# Patient Record
Sex: FEMALE | Race: WHITE | NOT HISPANIC OR LATINO
[De-identification: names, ages, dates, MRNs, and addresses within clinical notes are randomized per-mention and may not be internally consistent; named-entity substitution may affect disease eponyms.]

---

## 2017-02-21 ENCOUNTER — RX RENEWAL (OUTPATIENT)
Age: 72
End: 2017-02-21

## 2017-11-10 ENCOUNTER — RX RENEWAL (OUTPATIENT)
Age: 72
End: 2017-11-10

## 2017-11-27 ENCOUNTER — NON-APPOINTMENT (OUTPATIENT)
Age: 72
End: 2017-11-27

## 2017-11-27 ENCOUNTER — LABORATORY RESULT (OUTPATIENT)
Age: 72
End: 2017-11-27

## 2017-11-27 ENCOUNTER — APPOINTMENT (OUTPATIENT)
Dept: INTERNAL MEDICINE | Facility: CLINIC | Age: 72
End: 2017-11-27
Payer: MEDICARE

## 2017-11-27 VITALS
WEIGHT: 165 LBS | SYSTOLIC BLOOD PRESSURE: 126 MMHG | DIASTOLIC BLOOD PRESSURE: 84 MMHG | BODY MASS INDEX: 28.17 KG/M2 | HEIGHT: 64 IN

## 2017-11-27 VITALS
DIASTOLIC BLOOD PRESSURE: 84 MMHG | BODY MASS INDEX: 28.17 KG/M2 | WEIGHT: 165 LBS | SYSTOLIC BLOOD PRESSURE: 126 MMHG | HEIGHT: 64 IN

## 2017-11-27 PROCEDURE — 93000 ELECTROCARDIOGRAM COMPLETE: CPT

## 2017-11-27 PROCEDURE — 94010 BREATHING CAPACITY TEST: CPT

## 2017-11-27 PROCEDURE — 36415 COLL VENOUS BLD VENIPUNCTURE: CPT

## 2017-11-27 PROCEDURE — 99215 OFFICE O/P EST HI 40 MIN: CPT | Mod: 25

## 2017-12-26 LAB
ALBUMIN SERPL ELPH-MCNC: 4.3 G/DL
ALP BLD-CCNC: 81 U/L
ALT SERPL-CCNC: 19 U/L
ANION GAP SERPL CALC-SCNC: 17 MMOL/L
APPEARANCE: CLEAR
AST SERPL-CCNC: 20 U/L
BASOPHILS # BLD AUTO: 0.01 K/UL
BASOPHILS NFR BLD AUTO: 0.1 %
BILIRUB SERPL-MCNC: 0.6 MG/DL
BILIRUBIN URINE: NEGATIVE
BLOOD URINE: NEGATIVE
BUN SERPL-MCNC: 15 MG/DL
CALCIUM SERPL-MCNC: 10 MG/DL
CHLORIDE SERPL-SCNC: 103 MMOL/L
CHOLEST SERPL-MCNC: 160 MG/DL
CHOLEST/HDLC SERPL: 2.5 RATIO
CO2 SERPL-SCNC: 21 MMOL/L
COLOR: YELLOW
CREAT SERPL-MCNC: 0.89 MG/DL
EOSINOPHIL # BLD AUTO: 0.06 K/UL
EOSINOPHIL NFR BLD AUTO: 0.4 %
GLUCOSE QUALITATIVE U: NEGATIVE MG/DL
GLUCOSE SERPL-MCNC: 107 MG/DL
HCT VFR BLD CALC: 40.4 %
HDLC SERPL-MCNC: 64 MG/DL
HGB BLD-MCNC: 13 G/DL
IMM GRANULOCYTES NFR BLD AUTO: 0.3 %
KETONES URINE: NEGATIVE
LDLC SERPL CALC-MCNC: 76 MG/DL
LEUKOCYTE ESTERASE URINE: NEGATIVE
LYMPHOCYTES # BLD AUTO: 2.03 K/UL
LYMPHOCYTES NFR BLD AUTO: 12.8 %
MAN DIFF?: NORMAL
MCHC RBC-ENTMCNC: 26.7 PG
MCHC RBC-ENTMCNC: 32.2 GM/DL
MCV RBC AUTO: 83.1 FL
MONOCYTES # BLD AUTO: 0.66 K/UL
MONOCYTES NFR BLD AUTO: 4.2 %
NEUTROPHILS # BLD AUTO: 13.05 K/UL
NEUTROPHILS NFR BLD AUTO: 82.2 %
NITRITE URINE: NEGATIVE
PH URINE: 6
PLATELET # BLD AUTO: 202 K/UL
POTASSIUM SERPL-SCNC: 4.4 MMOL/L
PROT SERPL-MCNC: 7.6 G/DL
PROTEIN URINE: NEGATIVE MG/DL
RBC # BLD: 4.86 M/UL
RBC # FLD: 13.3 %
SODIUM SERPL-SCNC: 141 MMOL/L
SPECIFIC GRAVITY URINE: 1.02
T3 SERPL-MCNC: 109 NG/DL
T4 SERPL-MCNC: 7.9 UG/DL
TRIGL SERPL-MCNC: 100 MG/DL
TSH SERPL-ACNC: 1.92 UIU/ML
UROBILINOGEN URINE: NEGATIVE MG/DL
WBC # FLD AUTO: 15.86 K/UL

## 2018-03-15 ENCOUNTER — RX RENEWAL (OUTPATIENT)
Age: 73
End: 2018-03-15

## 2018-07-26 ENCOUNTER — APPOINTMENT (OUTPATIENT)
Dept: ORTHOPEDIC SURGERY | Facility: CLINIC | Age: 73
End: 2018-07-26
Payer: MEDICARE

## 2018-07-26 VITALS
DIASTOLIC BLOOD PRESSURE: 80 MMHG | SYSTOLIC BLOOD PRESSURE: 142 MMHG | HEART RATE: 65 BPM | WEIGHT: 154 LBS | BODY MASS INDEX: 26.29 KG/M2 | HEIGHT: 64 IN

## 2018-07-26 VITALS — HEIGHT: 64 IN

## 2018-07-26 DIAGNOSIS — Z78.9 OTHER SPECIFIED HEALTH STATUS: ICD-10-CM

## 2018-07-26 DIAGNOSIS — M17.11 UNILATERAL PRIMARY OSTEOARTHRITIS, RIGHT KNEE: ICD-10-CM

## 2018-07-26 DIAGNOSIS — Z82.49 FAMILY HISTORY OF ISCHEMIC HEART DISEASE AND OTHER DISEASES OF THE CIRCULATORY SYSTEM: ICD-10-CM

## 2018-07-26 DIAGNOSIS — Z60.2 PROBLEMS RELATED TO LIVING ALONE: ICD-10-CM

## 2018-07-26 DIAGNOSIS — M25.561 PAIN IN RIGHT KNEE: ICD-10-CM

## 2018-07-26 PROCEDURE — 72170 X-RAY EXAM OF PELVIS: CPT

## 2018-07-26 PROCEDURE — 99204 OFFICE O/P NEW MOD 45 MIN: CPT

## 2018-07-26 PROCEDURE — 73562 X-RAY EXAM OF KNEE 3: CPT | Mod: RT

## 2018-07-26 SDOH — SOCIAL STABILITY - SOCIAL INSECURITY: PROBLEMS RELATED TO LIVING ALONE: Z60.2

## 2018-08-03 PROBLEM — Z82.49 FAMILY HISTORY OF CARDIAC DISORDER: Status: ACTIVE | Noted: 2018-07-26

## 2018-08-03 PROBLEM — Z78.9 DOES NOT USE ILLICIT DRUGS: Status: ACTIVE | Noted: 2018-07-26

## 2018-09-04 LAB — NONINV COLON CA DNA+OCC BLD SCRN STL QL: NEGATIVE

## 2018-12-06 ENCOUNTER — APPOINTMENT (OUTPATIENT)
Dept: INTERNAL MEDICINE | Facility: CLINIC | Age: 73
End: 2018-12-06
Payer: MEDICARE

## 2018-12-06 VITALS
OXYGEN SATURATION: 96 % | DIASTOLIC BLOOD PRESSURE: 80 MMHG | TEMPERATURE: 97.3 F | HEART RATE: 64 BPM | BODY MASS INDEX: 26.98 KG/M2 | HEIGHT: 64 IN | WEIGHT: 158 LBS | SYSTOLIC BLOOD PRESSURE: 130 MMHG | RESPIRATION RATE: 16 BRPM

## 2018-12-06 DIAGNOSIS — F51.02 ADJUSTMENT INSOMNIA: ICD-10-CM

## 2018-12-06 PROCEDURE — 93000 ELECTROCARDIOGRAM COMPLETE: CPT

## 2018-12-06 PROCEDURE — G0438: CPT | Mod: 25

## 2018-12-06 PROCEDURE — 36415 COLL VENOUS BLD VENIPUNCTURE: CPT

## 2018-12-06 PROCEDURE — G0009: CPT

## 2018-12-06 PROCEDURE — 99215 OFFICE O/P EST HI 40 MIN: CPT | Mod: 25

## 2018-12-06 PROCEDURE — 94010 BREATHING CAPACITY TEST: CPT

## 2018-12-06 PROCEDURE — 90670 PCV13 VACCINE IM: CPT

## 2018-12-06 NOTE — HEALTH RISK ASSESSMENT
[Very Good] : ~his/her~  mood as very good [FreeTextEntry1] : Gen. health [] : No [No falls in past year] : Patient reported no falls in the past year [0] : 2) Feeling down, depressed, or hopeless: Not at all (0) [de-identified] : Orthopedics [[id number]] : 0 [Patient reported mammogram was normal] : Patient reported mammogram was normal [Patient reported colonoscopy was normal] : Patient reported colonoscopy was normal [HIV test declined] : HIV test declined [Hepatitis C test declined] : Hepatitis C test declined [Change in mental status noted] : No change in mental status noted [Language] : denies difficulty with language [Behavior] : denies difficulty with behavior [Learning/Retaining New Information] : denies difficulty learning/retaining new information [Handling Complex Tasks] : denies difficulty handling complex tasks [Reasoning] : denies difficulty with reasoning [Spatial Ability and Orientation] : denies difficulty with spatial ability and orientation [None] : None [Alone] : lives alone [Unemployed] : unemployed [Retired] : retired [] :  [Sexually Active] : not sexually active [High Risk Behavior] : no high risk behavior [Feels Safe at Home] : Feels safe at home [Fully functional (bathing, dressing, toileting, transferring, walking, feeding)] : Fully functional (bathing, dressing, toileting, transferring, walking, feeding) [Fully functional (using the telephone, shopping, preparing meals, housekeeping, doing laundry, using] : Fully functional and needs no help or supervision to perform IADLs (using the telephone, shopping, preparing meals, housekeeping, doing laundry, using transportation, managing medications and managing finances) [Reports changes in hearing] : Reports no changes in hearing [Reports changes in vision] : Reports no changes in vision [Reports changes in dental health] : Reports no changes in dental health [Smoke Detector] : smoke detector [Carbon Monoxide Detector] : carbon monoxide detector [Guns at Home] : no guns at home [Seat Belt] :  uses seat belt [Sunscreen] : uses sunscreen [Travel to Developing Areas] : does not  travel to developing areas [Caregiver Concerns] : does not have caregiver concerns [MammogramDate] : 12/17 [BoneDensityDate] : 4/15 [ColonoscopyDate] : 7/18

## 2018-12-06 NOTE — HISTORY OF PRESENT ILLNESS
[FreeTextEntry1] : Complete annual examination [de-identified] : Heels well except for chronic fatigue, not new and generalized osteoarthritis with some focus on the right knee. Recent orthopedic visit-not bone-on-bone.

## 2018-12-06 NOTE — ASSESSMENT
[FreeTextEntry1] : She will continue atorvastatin. She has a cataract OD, she is reluctant to address this because of problems that her  had. Also the symptoms are somewhat moderated. The right knee issue is also moderate enough for her to live with\par \par Influenza immunization is refused by the patient. She is warned.\par \par Prevnar 13 is administered to the left arm. She should receive Pneumovax-23 after 12 months.

## 2018-12-06 NOTE — DATA REVIEWED
[FreeTextEntry1] : Echocardiogram reveals a sinus rhythm with minimal nonspecific ST and T abnormalities. Spirometry is normal. Pulse oximetry is normal.                                                                                                                      Blood work and urinalysis results are pending.

## 2018-12-06 NOTE — REVIEW OF SYSTEMS
[Patient Intake Form Reviewed] : Patient intake form was reviewed [Vision Problems] : vision problems [Chest Pain] : no chest pain [Palpitations] : palpitations [Leg Claudication] : no leg claudication [Lower Ext Edema] : no lower extremity edema [Orthopnea] : no orthopnea [Paroysmal Nocturnal Dyspnea] : no paroysmal nocturnal dyspnea [Negative] : Neurological [FreeTextEntry3] : Mild Tierney issue at night [FreeTextEntry5] : Only with stress [FreeTextEntry9] : See present Illness.

## 2018-12-12 LAB
ALBUMIN SERPL ELPH-MCNC: 4.4 G/DL
ALP BLD-CCNC: 83 U/L
ALT SERPL-CCNC: 24 U/L
ANION GAP SERPL CALC-SCNC: 10 MMOL/L
APPEARANCE: CLEAR
AST SERPL-CCNC: 21 U/L
BASOPHILS # BLD AUTO: 0.01 K/UL
BASOPHILS NFR BLD AUTO: 0.1 %
BILIRUB SERPL-MCNC: 0.5 MG/DL
BILIRUBIN URINE: NEGATIVE
BLOOD URINE: NEGATIVE
BUN SERPL-MCNC: 16 MG/DL
CALCIUM SERPL-MCNC: 9.9 MG/DL
CHLORIDE SERPL-SCNC: 108 MMOL/L
CO2 SERPL-SCNC: 26 MMOL/L
COLOR: YELLOW
CREAT SERPL-MCNC: 1.08 MG/DL
EOSINOPHIL # BLD AUTO: 0.15 K/UL
EOSINOPHIL NFR BLD AUTO: 1.9 %
GLUCOSE QUALITATIVE U: NEGATIVE MG/DL
GLUCOSE SERPL-MCNC: 92 MG/DL
HCT VFR BLD CALC: 40.9 %
HGB BLD-MCNC: 13.2 G/DL
IMM GRANULOCYTES NFR BLD AUTO: 0.3 %
KETONES URINE: NEGATIVE
LEUKOCYTE ESTERASE URINE: NEGATIVE
LYMPHOCYTES # BLD AUTO: 2.22 K/UL
LYMPHOCYTES NFR BLD AUTO: 28.2 %
MAN DIFF?: NORMAL
MCHC RBC-ENTMCNC: 26.8 PG
MCHC RBC-ENTMCNC: 32.3 GM/DL
MCV RBC AUTO: 83.1 FL
MONOCYTES # BLD AUTO: 0.47 K/UL
MONOCYTES NFR BLD AUTO: 6 %
NEUTROPHILS # BLD AUTO: 4.99 K/UL
NEUTROPHILS NFR BLD AUTO: 63.5 %
NITRITE URINE: NEGATIVE
PH URINE: 5.5
PLATELET # BLD AUTO: 214 K/UL
POTASSIUM SERPL-SCNC: 4.6 MMOL/L
PROT SERPL-MCNC: 7.4 G/DL
PROTEIN URINE: NEGATIVE MG/DL
RBC # BLD: 4.92 M/UL
RBC # FLD: 13.3 %
SODIUM SERPL-SCNC: 144 MMOL/L
SPECIFIC GRAVITY URINE: 1.02
T3 SERPL-MCNC: 107 NG/DL
T4 SERPL-MCNC: 7.9 UG/DL
TSH SERPL-ACNC: 1.89 UIU/ML
UROBILINOGEN URINE: NEGATIVE MG/DL
WBC # FLD AUTO: 7.86 K/UL

## 2019-05-16 ENCOUNTER — APPOINTMENT (OUTPATIENT)
Dept: ORTHOPEDIC SURGERY | Facility: CLINIC | Age: 74
End: 2019-05-16

## 2019-06-20 ENCOUNTER — MEDICATION RENEWAL (OUTPATIENT)
Age: 74
End: 2019-06-20

## 2019-06-20 DIAGNOSIS — F51.09 OTHER INSOMNIA NOT DUE TO A SUBSTANCE OR KNOWN PHYSIOLOGICAL CONDITION: ICD-10-CM

## 2019-10-02 PROBLEM — Z60.2 PERSON LIVING ALONE: Status: ACTIVE | Noted: 2018-07-26

## 2019-12-09 ENCOUNTER — APPOINTMENT (OUTPATIENT)
Dept: INTERNAL MEDICINE | Facility: CLINIC | Age: 74
End: 2019-12-09
Payer: MEDICARE

## 2019-12-09 VITALS
TEMPERATURE: 98.2 F | RESPIRATION RATE: 16 BRPM | HEIGHT: 64 IN | BODY MASS INDEX: 26.63 KG/M2 | HEART RATE: 65 BPM | WEIGHT: 156 LBS | SYSTOLIC BLOOD PRESSURE: 128 MMHG | OXYGEN SATURATION: 98 % | DIASTOLIC BLOOD PRESSURE: 78 MMHG

## 2019-12-09 DIAGNOSIS — R06.09 OTHER FORMS OF DYSPNEA: ICD-10-CM

## 2019-12-09 DIAGNOSIS — Z23 ENCOUNTER FOR IMMUNIZATION: ICD-10-CM

## 2019-12-09 PROCEDURE — G0009: CPT

## 2019-12-09 PROCEDURE — 99215 OFFICE O/P EST HI 40 MIN: CPT | Mod: 25

## 2019-12-09 PROCEDURE — 94010 BREATHING CAPACITY TEST: CPT

## 2019-12-09 PROCEDURE — 90732 PPSV23 VACC 2 YRS+ SUBQ/IM: CPT

## 2019-12-09 PROCEDURE — 36415 COLL VENOUS BLD VENIPUNCTURE: CPT

## 2019-12-09 PROCEDURE — 93000 ELECTROCARDIOGRAM COMPLETE: CPT

## 2019-12-09 PROCEDURE — G0439: CPT

## 2019-12-09 RX ORDER — CEPHALEXIN 500 MG/1
500 TABLET ORAL EVERY 8 HOURS
Qty: 21 | Refills: 0 | Status: DISCONTINUED | COMMUNITY
Start: 2017-11-27 | End: 2019-12-09

## 2019-12-09 NOTE — DATA REVIEWED
[FreeTextEntry1] : Electrocardiogram reveals a sinus bradycardia at 56 minimal nonspecific T wave abnormalities-borderline ECG-no significant change. Spirometry is normal.Pulse oximetry is 98%.\par \par Blood work and urinalysis results are pending.

## 2019-12-09 NOTE — HISTORY OF PRESENT ILLNESS
[FreeTextEntry1] : Complete annual examination [de-identified] : Feels well except for arthritic pains in the left hip and left foot/toe

## 2019-12-09 NOTE — ASSESSMENT
[FreeTextEntry1] : Doing well. Osteoarthritis is moderate in severity.\par \par Pneumovax-23 is administered to the left arm.   Influenza immunization is refused by the patient-arned.\par \par CPE in one year.

## 2019-12-09 NOTE — HEALTH RISK ASSESSMENT
[Very Good] : ~his/her~  mood as very good [No] : In the past 12 months have you used drugs other than those required for medical reasons? No [No falls in past year] : Patient reported no falls in the past year [0] : 2) Feeling down, depressed, or hopeless: Not at all (0) [Patient reported mammogram was normal] : Patient reported mammogram was normal [HIV test declined] : HIV test declined [Hepatitis C test declined] : Hepatitis C test declined [None] : None [Retired] : retired [Alone] : lives alone [] :  [Fully functional (bathing, dressing, toileting, transferring, walking, feeding)] : Fully functional (bathing, dressing, toileting, transferring, walking, feeding) [Feels Safe at Home] : Feels safe at home [Fully functional (using the telephone, shopping, preparing meals, housekeeping, doing laundry, using] : Fully functional and needs no help or supervision to perform IADLs (using the telephone, shopping, preparing meals, housekeeping, doing laundry, using transportation, managing medications and managing finances) [Reports normal functional visual acuity (ie: able to read med bottle)] : Reports normal functional visual acuity [Smoke Detector] : smoke detector [Carbon Monoxide Detector] : carbon monoxide detector [Seat Belt] :  uses seat belt [Sunscreen] : uses sunscreen [Patient/Caregiver unclear of wishes] : Patient/Caregiver unclear of wishes [FreeTextEntry1] : Gen. health [] : No [de-identified] : GYN [de-identified] : yoga regularly at the gym [de-identified] : Generally healthy [[id number]] : 0 [Change in mental status noted] : No change in mental status noted [Language] : denies difficulty with language [Learning/Retaining New Information] : denies difficulty learning/retaining new information [Behavior] : denies difficulty with behavior [Handling Complex Tasks] : denies difficulty handling complex tasks [Reasoning] : denies difficulty with reasoning [Spatial Ability and Orientation] : denies difficulty with spatial ability and orientation [Sexually Active] : not sexually active [High Risk Behavior] : no high risk behavior [Reports changes in hearing] : Reports no changes in hearing [Reports changes in vision] : Reports no changes in vision [Reports changes in dental health] : Reports no changes in dental health [Guns at Home] : no guns at home [MammogramDate] : 12/17 [Caregiver Concerns] : does not have caregiver concerns [MammogramComments] : Rx provided for followup study [AdvancecareDate] : 12/19

## 2019-12-09 NOTE — REVIEW OF SYSTEMS
[Patient Intake Form Reviewed] : Patient intake form was reviewed [Vision Problems] : vision problems [Palpitations] : palpitations [Negative] : Neurological [Chest Pain] : no chest pain [Leg Claudication] : no leg claudication [Lower Ext Edema] : no lower extremity edema [Orthopnea] : no orthopnea [FreeTextEntry3] : Mild Tierney issue at night [FreeTextEntry5] : Only with stress [FreeTextEntry9] : See present Illness.

## 2019-12-27 LAB
ALBUMIN SERPL ELPH-MCNC: 4.4 G/DL
ALP BLD-CCNC: 84 U/L
ALT SERPL-CCNC: 19 U/L
ANION GAP SERPL CALC-SCNC: 12 MMOL/L
APPEARANCE: CLEAR
AST SERPL-CCNC: 21 U/L
BASOPHILS # BLD AUTO: 0.02 K/UL
BASOPHILS NFR BLD AUTO: 0.3 %
BILIRUB SERPL-MCNC: 0.6 MG/DL
BILIRUBIN URINE: NEGATIVE
BLOOD URINE: NEGATIVE
BUN SERPL-MCNC: 13 MG/DL
CALCIUM SERPL-MCNC: 9.9 MG/DL
CHLORIDE SERPL-SCNC: 105 MMOL/L
CHOLEST SERPL-MCNC: 169 MG/DL
CHOLEST/HDLC SERPL: 2.7 RATIO
CO2 SERPL-SCNC: 25 MMOL/L
COLOR: YELLOW
CREAT SERPL-MCNC: 0.86 MG/DL
EOSINOPHIL # BLD AUTO: 0.1 K/UL
EOSINOPHIL NFR BLD AUTO: 1.3 %
GLUCOSE QUALITATIVE U: NEGATIVE
GLUCOSE SERPL-MCNC: 98 MG/DL
HCT VFR BLD CALC: 43.3 %
HDLC SERPL-MCNC: 62 MG/DL
HGB BLD-MCNC: 13.6 G/DL
IMM GRANULOCYTES NFR BLD AUTO: 0.3 %
KETONES URINE: NEGATIVE
LDLC SERPL CALC-MCNC: 85 MG/DL
LEUKOCYTE ESTERASE URINE: NEGATIVE
LYMPHOCYTES # BLD AUTO: 2.28 K/UL
LYMPHOCYTES NFR BLD AUTO: 30 %
MAN DIFF?: NORMAL
MCHC RBC-ENTMCNC: 27.1 PG
MCHC RBC-ENTMCNC: 31.4 GM/DL
MCV RBC AUTO: 86.3 FL
MONOCYTES # BLD AUTO: 0.43 K/UL
MONOCYTES NFR BLD AUTO: 5.7 %
NEUTROPHILS # BLD AUTO: 4.75 K/UL
NEUTROPHILS NFR BLD AUTO: 62.4 %
NITRITE URINE: NEGATIVE
PH URINE: 6
PLATELET # BLD AUTO: 208 K/UL
POTASSIUM SERPL-SCNC: 4.2 MMOL/L
PROT SERPL-MCNC: 7.5 G/DL
PROTEIN URINE: NORMAL
RBC # BLD: 5.02 M/UL
RBC # FLD: 13 %
SODIUM SERPL-SCNC: 142 MMOL/L
SPECIFIC GRAVITY URINE: 1.02
T3 SERPL-MCNC: 118 NG/DL
T4 SERPL-MCNC: 8.1 UG/DL
TRIGL SERPL-MCNC: 111 MG/DL
TSH SERPL-ACNC: 2.2 UIU/ML
UROBILINOGEN URINE: NORMAL
WBC # FLD AUTO: 7.6 K/UL

## 2020-03-02 ENCOUNTER — RX RENEWAL (OUTPATIENT)
Age: 75
End: 2020-03-02

## 2020-03-09 ENCOUNTER — TRANSCRIPTION ENCOUNTER (OUTPATIENT)
Age: 75
End: 2020-03-09

## 2020-10-07 ENCOUNTER — RX RENEWAL (OUTPATIENT)
Age: 75
End: 2020-10-07

## 2020-12-10 ENCOUNTER — APPOINTMENT (OUTPATIENT)
Dept: INTERNAL MEDICINE | Facility: CLINIC | Age: 75
End: 2020-12-10
Payer: MEDICARE

## 2020-12-10 VITALS
OXYGEN SATURATION: 98 % | BODY MASS INDEX: 26.12 KG/M2 | TEMPERATURE: 96.6 F | HEART RATE: 68 BPM | WEIGHT: 153 LBS | SYSTOLIC BLOOD PRESSURE: 122 MMHG | HEIGHT: 64 IN | RESPIRATION RATE: 16 BRPM | DIASTOLIC BLOOD PRESSURE: 68 MMHG

## 2020-12-10 DIAGNOSIS — R94.31 ABNORMAL ELECTROCARDIOGRAM [ECG] [EKG]: ICD-10-CM

## 2020-12-10 DIAGNOSIS — F51.01 PRIMARY INSOMNIA: ICD-10-CM

## 2020-12-10 DIAGNOSIS — Z20.828 CONTACT WITH AND (SUSPECTED) EXPOSURE TO OTHER VIRAL COMMUNICABLE DISEASES: ICD-10-CM

## 2020-12-10 DIAGNOSIS — M54.9 DORSALGIA, UNSPECIFIED: ICD-10-CM

## 2020-12-10 PROCEDURE — 71046 X-RAY EXAM CHEST 2 VIEWS: CPT

## 2020-12-10 PROCEDURE — 93000 ELECTROCARDIOGRAM COMPLETE: CPT

## 2020-12-10 PROCEDURE — 36415 COLL VENOUS BLD VENIPUNCTURE: CPT

## 2020-12-10 PROCEDURE — 99072 ADDL SUPL MATRL&STAF TM PHE: CPT

## 2020-12-10 PROCEDURE — 99397 PER PM REEVAL EST PAT 65+ YR: CPT | Mod: 25

## 2020-12-10 RX ORDER — ASPIRIN 81 MG/1
81 TABLET, COATED ORAL
Refills: 0 | Status: DISCONTINUED | COMMUNITY
End: 2020-12-10

## 2020-12-10 RX ORDER — DICLOFENAC SODIUM 75 MG/1
75 TABLET, DELAYED RELEASE ORAL
Qty: 60 | Refills: 1 | Status: DISCONTINUED | COMMUNITY
Start: 2018-07-26 | End: 2020-12-10

## 2020-12-10 NOTE — HEALTH RISK ASSESSMENT
[Very Good] : ~his/her~  mood as very good [No] : In the past 12 months have you used drugs other than those required for medical reasons? No [No falls in past year] : Patient reported no falls in the past year [0] : 2) Feeling down, depressed, or hopeless: Not at all (0) [Patient reported mammogram was normal] : Patient reported mammogram was normal [Patient reported bone density results were normal] : Patient reported bone density results were normal [Patient declined colonoscopy] : Patient declined colonoscopy [HIV test declined] : HIV test declined [Hepatitis C test declined] : Hepatitis C test declined [None] : None [Alone] : lives alone [Retired] : retired [] :  [Feels Safe at Home] : Feels safe at home [Fully functional (bathing, dressing, toileting, transferring, walking, feeding)] : Fully functional (bathing, dressing, toileting, transferring, walking, feeding) [Fully functional (using the telephone, shopping, preparing meals, housekeeping, doing laundry, using] : Fully functional and needs no help or supervision to perform IADLs (using the telephone, shopping, preparing meals, housekeeping, doing laundry, using transportation, managing medications and managing finances) [Reports normal functional visual acuity (ie: able to read med bottle)] : Reports normal functional visual acuity [Smoke Detector] : smoke detector [Carbon Monoxide Detector] : carbon monoxide detector [Safety elements used in home] : safety elements used in home [Seat Belt] :  uses seat belt [Sunscreen] : uses sunscreen [Patient/Caregiver not ready to engage] : Patient/Caregiver not ready to engage [FreeTextEntry1] : gen. health [] : No [de-identified] : orthopedic [de-identified] : Limited because of arthritis of the knees [de-identified] : Healthy [[id number]] : 0 [Change in mental status noted] : No change in mental status noted [Language] : denies difficulty with language [Behavior] : denies difficulty with behavior [Learning/Retaining New Information] : denies difficulty learning/retaining new information [Handling Complex Tasks] : denies difficulty handling complex tasks [Reasoning] : denies difficulty with reasoning [Spatial Ability and Orientation] : denies difficulty with spatial ability and orientation [Sexually Active] : not sexually active [High Risk Behavior] : no high risk behavior [Reports changes in hearing] : Reports no changes in hearing [Reports changes in vision] : Reports no changes in vision [Reports changes in dental health] : Reports no changes in dental health [Guns at Home] : no guns at home [Travel to Developing Areas] : does not  travel to developing areas [TB Exposure] : is not being exposed to tuberculosis [Caregiver Concerns] : does not have caregiver concerns [MammogramDate] : 12/17 [BoneDensityDate] : 4/15 [ColonoscopyComments] : FIT- DNA test  -7/18 [AdvancecareDate] : 12/2020

## 2020-12-10 NOTE — REVIEW OF SYSTEMS
[Patient Intake Form Reviewed] : Patient intake form was reviewed [Vision Problems] : vision problems [Palpitations] : palpitations [Joint Pain] : joint pain [Joint Stiffness] : joint stiffness [Negative] : Psychiatric [Chest Pain] : no chest pain [Leg Claudication] : no leg claudication [Lower Ext Edema] : no lower extremity edema [Orthopnea] : no orthopnea [FreeTextEntry3] : Mild glare issue at night [FreeTextEntry5] : Only with stress [FreeTextEntry9] : Knees are chronic problem

## 2020-12-10 NOTE — ASSESSMENT
[FreeTextEntry1] : normal health.Generalized arthralgias and arthritis.\par \par Blood tests are pending.\par \par CPE in one year including chest x-ray.\par \par The patient refuses influenza immunization, she has never done it. [place].

## 2020-12-10 NOTE — DATA REVIEWED
[FreeTextEntry1] : Electrocardiogram reveals a sinus rhythm with low voltage and a right axis\par \par Chest x-ray reveals clear lung fields, normal heart size and a moderate kyphosis. No active disease\par \par Blood work results including covid antibodies are pending.

## 2020-12-14 LAB
ALBUMIN SERPL ELPH-MCNC: 4.6 G/DL
ALP BLD-CCNC: 89 U/L
ALT SERPL-CCNC: 16 U/L
ANION GAP SERPL CALC-SCNC: 13 MMOL/L
APPEARANCE: CLEAR
AST SERPL-CCNC: 14 U/L
BASOPHILS # BLD AUTO: 0.03 K/UL
BASOPHILS NFR BLD AUTO: 0.3 %
BILIRUB SERPL-MCNC: 0.4 MG/DL
BILIRUBIN URINE: NEGATIVE
BLOOD URINE: NEGATIVE
BUN SERPL-MCNC: 19 MG/DL
CALCIUM SERPL-MCNC: 10.2 MG/DL
CHLORIDE SERPL-SCNC: 106 MMOL/L
CHOLEST SERPL-MCNC: 182 MG/DL
CO2 SERPL-SCNC: 24 MMOL/L
COLOR: YELLOW
CREAT SERPL-MCNC: 1.14 MG/DL
EOSINOPHIL # BLD AUTO: 0.12 K/UL
EOSINOPHIL NFR BLD AUTO: 1.2 %
GLUCOSE QUALITATIVE U: NEGATIVE
GLUCOSE SERPL-MCNC: 109 MG/DL
HCT VFR BLD CALC: 44.8 %
HDLC SERPL-MCNC: 60 MG/DL
HGB BLD-MCNC: 13.6 G/DL
IMM GRANULOCYTES NFR BLD AUTO: 0.4 %
KETONES URINE: NEGATIVE
LDLC SERPL CALC-MCNC: 87 MG/DL
LEUKOCYTE ESTERASE URINE: NEGATIVE
LYMPHOCYTES # BLD AUTO: 2.76 K/UL
LYMPHOCYTES NFR BLD AUTO: 27.6 %
MAN DIFF?: NORMAL
MCHC RBC-ENTMCNC: 26.3 PG
MCHC RBC-ENTMCNC: 30.4 GM/DL
MCV RBC AUTO: 86.7 FL
MONOCYTES # BLD AUTO: 0.53 K/UL
MONOCYTES NFR BLD AUTO: 5.3 %
NEUTROPHILS # BLD AUTO: 6.53 K/UL
NEUTROPHILS NFR BLD AUTO: 65.2 %
NITRITE URINE: NEGATIVE
NONHDLC SERPL-MCNC: 122 MG/DL
PH URINE: 6
PLATELET # BLD AUTO: 205 K/UL
POTASSIUM SERPL-SCNC: 4.4 MMOL/L
PROT SERPL-MCNC: 7.3 G/DL
PROTEIN URINE: NORMAL
RBC # BLD: 5.17 M/UL
RBC # FLD: 12.6 %
SARS-COV-2 IGG SERPL IA-ACNC: <0.1 INDEX
SARS-COV-2 IGG SERPL QL IA: NEGATIVE
SODIUM SERPL-SCNC: 144 MMOL/L
SPECIFIC GRAVITY URINE: 1.03
T3 SERPL-MCNC: 101 NG/DL
T4 SERPL-MCNC: 7.3 UG/DL
TRIGL SERPL-MCNC: 173 MG/DL
TSH SERPL-ACNC: 2.17 UIU/ML
UROBILINOGEN URINE: NORMAL
WBC # FLD AUTO: 10.01 K/UL

## 2021-03-09 ENCOUNTER — RX RENEWAL (OUTPATIENT)
Age: 76
End: 2021-03-09

## 2021-04-03 ENCOUNTER — TRANSCRIPTION ENCOUNTER (OUTPATIENT)
Age: 76
End: 2021-04-03

## 2021-04-05 ENCOUNTER — TRANSCRIPTION ENCOUNTER (OUTPATIENT)
Age: 76
End: 2021-04-05

## 2021-04-06 ENCOUNTER — INPATIENT (INPATIENT)
Facility: HOSPITAL | Age: 76
LOS: 5 days | Discharge: HOME HEALTH | DRG: 580 | End: 2021-04-12
Attending: HOSPITALIST | Admitting: HOSPITALIST
Payer: MEDICARE

## 2021-04-06 ENCOUNTER — NON-APPOINTMENT (OUTPATIENT)
Age: 76
End: 2021-04-06

## 2021-04-06 ENCOUNTER — TRANSCRIPTION ENCOUNTER (OUTPATIENT)
Age: 76
End: 2021-04-06

## 2021-04-06 VITALS
HEART RATE: 62 BPM | RESPIRATION RATE: 20 BRPM | OXYGEN SATURATION: 96 % | WEIGHT: 164.91 LBS | TEMPERATURE: 98 F | HEIGHT: 64 IN | SYSTOLIC BLOOD PRESSURE: 139 MMHG | DIASTOLIC BLOOD PRESSURE: 88 MMHG

## 2021-04-06 DIAGNOSIS — Z02.9 ENCOUNTER FOR ADMINISTRATIVE EXAMINATIONS, UNSPECIFIED: ICD-10-CM

## 2021-04-06 DIAGNOSIS — L08.9 LOCAL INFECTION OF THE SKIN AND SUBCUTANEOUS TISSUE, UNSPECIFIED: ICD-10-CM

## 2021-04-06 DIAGNOSIS — L03.90 CELLULITIS, UNSPECIFIED: ICD-10-CM

## 2021-04-06 DIAGNOSIS — E78.5 HYPERLIPIDEMIA, UNSPECIFIED: ICD-10-CM

## 2021-04-06 DIAGNOSIS — Z29.9 ENCOUNTER FOR PROPHYLACTIC MEASURES, UNSPECIFIED: ICD-10-CM

## 2021-04-06 LAB
ALBUMIN SERPL ELPH-MCNC: 4.4 G/DL — SIGNIFICANT CHANGE UP (ref 3.3–5)
ALP SERPL-CCNC: 94 U/L — SIGNIFICANT CHANGE UP (ref 40–120)
ALT FLD-CCNC: 14 U/L — SIGNIFICANT CHANGE UP (ref 10–45)
ANION GAP SERPL CALC-SCNC: 16 MMOL/L — SIGNIFICANT CHANGE UP (ref 5–17)
AST SERPL-CCNC: 17 U/L — SIGNIFICANT CHANGE UP (ref 10–40)
BILIRUB SERPL-MCNC: 0.9 MG/DL — SIGNIFICANT CHANGE UP (ref 0.2–1.2)
BUN SERPL-MCNC: 17 MG/DL — SIGNIFICANT CHANGE UP (ref 7–23)
CALCIUM SERPL-MCNC: 10.2 MG/DL — SIGNIFICANT CHANGE UP (ref 8.4–10.5)
CHLORIDE SERPL-SCNC: 96 MMOL/L — SIGNIFICANT CHANGE UP (ref 96–108)
CO2 SERPL-SCNC: 21 MMOL/L — LOW (ref 22–31)
CREAT SERPL-MCNC: 0.95 MG/DL — SIGNIFICANT CHANGE UP (ref 0.5–1.3)
GLUCOSE SERPL-MCNC: 111 MG/DL — HIGH (ref 70–99)
HCT VFR BLD CALC: 42.2 % — SIGNIFICANT CHANGE UP (ref 34.5–45)
HGB BLD-MCNC: 13.5 G/DL — SIGNIFICANT CHANGE UP (ref 11.5–15.5)
MCHC RBC-ENTMCNC: 26.6 PG — LOW (ref 27–34)
MCHC RBC-ENTMCNC: 32 GM/DL — SIGNIFICANT CHANGE UP (ref 32–36)
MCV RBC AUTO: 83.1 FL — SIGNIFICANT CHANGE UP (ref 80–100)
NRBC # BLD: 0 /100 WBCS — SIGNIFICANT CHANGE UP (ref 0–0)
PLATELET # BLD AUTO: 236 K/UL — SIGNIFICANT CHANGE UP (ref 150–400)
POTASSIUM SERPL-MCNC: 3.4 MMOL/L — LOW (ref 3.5–5.3)
POTASSIUM SERPL-SCNC: 3.4 MMOL/L — LOW (ref 3.5–5.3)
PROT SERPL-MCNC: 8.2 G/DL — SIGNIFICANT CHANGE UP (ref 6–8.3)
RBC # BLD: 5.08 M/UL — SIGNIFICANT CHANGE UP (ref 3.8–5.2)
RBC # FLD: 12.5 % — SIGNIFICANT CHANGE UP (ref 10.3–14.5)
SARS-COV-2 RNA SPEC QL NAA+PROBE: SIGNIFICANT CHANGE UP
SODIUM SERPL-SCNC: 133 MMOL/L — LOW (ref 135–145)
WBC # BLD: 14.52 K/UL — HIGH (ref 3.8–10.5)
WBC # FLD AUTO: 14.52 K/UL — HIGH (ref 3.8–10.5)

## 2021-04-06 PROCEDURE — 99232 SBSQ HOSP IP/OBS MODERATE 35: CPT | Mod: 25

## 2021-04-06 PROCEDURE — 99285 EMERGENCY DEPT VISIT HI MDM: CPT

## 2021-04-06 PROCEDURE — 73110 X-RAY EXAM OF WRIST: CPT | Mod: 26,LT

## 2021-04-06 PROCEDURE — 71045 X-RAY EXAM CHEST 1 VIEW: CPT | Mod: 26

## 2021-04-06 PROCEDURE — 99223 1ST HOSP IP/OBS HIGH 75: CPT

## 2021-04-06 PROCEDURE — 73120 X-RAY EXAM OF HAND: CPT | Mod: 26,LT

## 2021-04-06 PROCEDURE — 10061 I&D ABSCESS COMP/MULTIPLE: CPT

## 2021-04-06 PROCEDURE — 93010 ELECTROCARDIOGRAM REPORT: CPT

## 2021-04-06 RX ORDER — AMPICILLIN SODIUM AND SULBACTAM SODIUM 250; 125 MG/ML; MG/ML
3 INJECTION, POWDER, FOR SUSPENSION INTRAMUSCULAR; INTRAVENOUS ONCE
Refills: 0 | Status: COMPLETED | OUTPATIENT
Start: 2021-04-06 | End: 2021-04-06

## 2021-04-06 RX ORDER — ATORVASTATIN CALCIUM 80 MG/1
20 TABLET, FILM COATED ORAL AT BEDTIME
Refills: 0 | Status: DISCONTINUED | OUTPATIENT
Start: 2021-04-06 | End: 2021-04-06

## 2021-04-06 RX ORDER — ZOLPIDEM TARTRATE 10 MG/1
5 TABLET ORAL AT BEDTIME
Refills: 0 | Status: DISCONTINUED | OUTPATIENT
Start: 2021-04-06 | End: 2021-04-12

## 2021-04-06 RX ORDER — IBUPROFEN 200 MG
400 TABLET ORAL EVERY 6 HOURS
Refills: 0 | Status: DISCONTINUED | OUTPATIENT
Start: 2021-04-06 | End: 2021-04-12

## 2021-04-06 RX ORDER — POTASSIUM CHLORIDE 20 MEQ
40 PACKET (EA) ORAL ONCE
Refills: 0 | Status: COMPLETED | OUTPATIENT
Start: 2021-04-06 | End: 2021-04-06

## 2021-04-06 RX ORDER — METRONIDAZOLE 500 MG
500 TABLET ORAL EVERY 8 HOURS
Refills: 0 | Status: DISCONTINUED | OUTPATIENT
Start: 2021-04-06 | End: 2021-04-06

## 2021-04-06 RX ORDER — ATORVASTATIN CALCIUM 80 MG/1
10 TABLET, FILM COATED ORAL AT BEDTIME
Refills: 0 | Status: DISCONTINUED | OUTPATIENT
Start: 2021-04-06 | End: 2021-04-12

## 2021-04-06 RX ORDER — ACETAMINOPHEN 500 MG
650 TABLET ORAL EVERY 6 HOURS
Refills: 0 | Status: DISCONTINUED | OUTPATIENT
Start: 2021-04-06 | End: 2021-04-12

## 2021-04-06 RX ORDER — AMPICILLIN SODIUM AND SULBACTAM SODIUM 250; 125 MG/ML; MG/ML
INJECTION, POWDER, FOR SUSPENSION INTRAMUSCULAR; INTRAVENOUS
Refills: 0 | Status: DISCONTINUED | OUTPATIENT
Start: 2021-04-06 | End: 2021-04-09

## 2021-04-06 RX ORDER — VANCOMYCIN HCL 1 G
1000 VIAL (EA) INTRAVENOUS ONCE
Refills: 0 | Status: DISCONTINUED | OUTPATIENT
Start: 2021-04-06 | End: 2021-04-06

## 2021-04-06 RX ORDER — ENOXAPARIN SODIUM 100 MG/ML
40 INJECTION SUBCUTANEOUS DAILY
Refills: 0 | Status: DISCONTINUED | OUTPATIENT
Start: 2021-04-06 | End: 2021-04-12

## 2021-04-06 RX ORDER — CEFTRIAXONE 500 MG/1
1000 INJECTION, POWDER, FOR SOLUTION INTRAMUSCULAR; INTRAVENOUS ONCE
Refills: 0 | Status: COMPLETED | OUTPATIENT
Start: 2021-04-06 | End: 2021-04-06

## 2021-04-06 RX ORDER — VANCOMYCIN HCL 1 G
1000 VIAL (EA) INTRAVENOUS EVERY 24 HOURS
Refills: 0 | Status: DISCONTINUED | OUTPATIENT
Start: 2021-04-06 | End: 2021-04-09

## 2021-04-06 RX ORDER — VANCOMYCIN HCL 1 G
VIAL (EA) INTRAVENOUS
Refills: 0 | Status: DISCONTINUED | OUTPATIENT
Start: 2021-04-06 | End: 2021-04-06

## 2021-04-06 RX ORDER — AMPICILLIN SODIUM AND SULBACTAM SODIUM 250; 125 MG/ML; MG/ML
3 INJECTION, POWDER, FOR SUSPENSION INTRAMUSCULAR; INTRAVENOUS EVERY 6 HOURS
Refills: 0 | Status: DISCONTINUED | OUTPATIENT
Start: 2021-04-07 | End: 2021-04-09

## 2021-04-06 RX ADMIN — ATORVASTATIN CALCIUM 10 MILLIGRAM(S): 80 TABLET, FILM COATED ORAL at 21:38

## 2021-04-06 RX ADMIN — Medication 40 MILLIEQUIVALENT(S): at 12:13

## 2021-04-06 RX ADMIN — Medication 100 MILLIGRAM(S): at 12:02

## 2021-04-06 RX ADMIN — CEFTRIAXONE 100 MILLIGRAM(S): 500 INJECTION, POWDER, FOR SOLUTION INTRAMUSCULAR; INTRAVENOUS at 10:24

## 2021-04-06 RX ADMIN — Medication 110 MILLIGRAM(S): at 14:06

## 2021-04-06 RX ADMIN — ENOXAPARIN SODIUM 40 MILLIGRAM(S): 100 INJECTION SUBCUTANEOUS at 14:06

## 2021-04-06 RX ADMIN — Medication 650 MILLIGRAM(S): at 21:50

## 2021-04-06 RX ADMIN — ZOLPIDEM TARTRATE 5 MILLIGRAM(S): 10 TABLET ORAL at 22:40

## 2021-04-06 RX ADMIN — Medication 650 MILLIGRAM(S): at 22:00

## 2021-04-06 RX ADMIN — AMPICILLIN SODIUM AND SULBACTAM SODIUM 200 GRAM(S): 250; 125 INJECTION, POWDER, FOR SUSPENSION INTRAMUSCULAR; INTRAVENOUS at 22:36

## 2021-04-06 RX ADMIN — Medication 250 MILLIGRAM(S): at 18:09

## 2021-04-06 NOTE — ED PROVIDER NOTE - CLINICAL SUMMARY MEDICAL DECISION MAKING FREE TEXT BOX
MD Dequan, PGY1: 70 yo F no sig PMH, p/w worsening skin infection s/p cat bite and outpatient abx failure. Will obtain CBC/CMP/abscess culture, start ctx and admit to medicine with hand consult/id consult.

## 2021-04-06 NOTE — ED ADULT TRIAGE NOTE - HEIGHT IN INCHES
[> 3 months] : more  than 3 months [Pain] : pain [Weakness] : weakness [Bending] : worsened by bending 4 [Lifting] : worsened by lifting [Recumbency] : relieved by recumbency [Rest] : relieved by rest [FreeTextEntry1] : lower back and LE pain  [de-identified] : IHSAN KIM is a 77 year old female presents for initial neurosurgical evaluation of lumbar spinal stenosis with neurogenic claudication.  Patient states she was previously evaluated by Dr. Forbes (neurosurgeon) who recommended pain management and chiropractic care.  Past medical history includes HLD, asthma and thyroid disease. Past surgical history includes R TKR 2016.  Patient mentions her symptoms began about 5 years ago and have been worsening over the last year.  No inciting event or trauma.  She states she has pain which starts at the waist line with intermittent radiation to bilateral lower extremities.  The legs are described as heaviness and weakness.  Pain intensity 7/10.  Denies any saddle anesthesia, urinary or bowel incontinence. Patient is ambulating independently but limited secondary to pain.  No assistive devices needed.  She states the pain is aggravated by bending, ambulation and prolonged standing.  It is improved with rest and Percocet.  Patient states she tried physical therapy which exacerbated her pain.  She was under the care of pain management, Dr. Holt.  Past treatments include median branch nerve block 6/2020, radiofrequency ablation and dual ASHUTOSH to lumbar region with no significant relief.  No LE EMG/NCS.  She is managing her symptoms with Percocet as needed.  Patient presents with bilateral lower extremity pitting edema /compression stockings.  Patient had venous doppler , negative for DVT. Followed by cardiology and PMD. Patient states she has a previous lumbar MRI 3/2019 but report and disc not available for review today. \par  [Ataxia] : no ataxia [Incontinence] : no incontinence [Loss of Dexterity] : good dexterity [Urinary Ret.] : no urinary retention

## 2021-04-06 NOTE — ED PROVIDER NOTE - NS ED ROS FT
Gen: Denies fever, weight loss  CV: Denies chest pain, palpitations  Skin: see HPI  Resp: Denies SOB, cough  Endo: Denies sensitivity to heat, cold, increased urination  GI: Denies constipation, nausea, vomiting  Msk: Denies back pain, LE swelling, extremity pain  : Denies dysuria, increased frequency  Neuro: Denies LOC, weakness, seizures  Psych: Denies hx of psych, hallucinations

## 2021-04-06 NOTE — CONSULT NOTE ADULT - SUBJECTIVE AND OBJECTIVE BOX
75 yo F, h/o HLD, presenting from urgent care after her house cat bit her on her bilateral hands and wrists on Friday, 4 days ago. She was seen initially at urgent care, 3 days ago, and started on augmentin at the time. She has been taking the antibiotic and thinks her pain and swelling have mildly improved, however she was referred to the ED when she followed up at urgent care. She reports swelling and poor ROM of her left first 3 digits, and purulent drainage from one wound on the dorsum of her left hand. The rest of the cat bite wounds have scabbed over. She denies any fevers, chills, chest pain, palpitations, sob, cough, abdominal pain, nausea, diarrhea, numbness or tingling in fingers, or any other complaints at this time.      PMH:  HLD (hyperlipidemia)      PSH:  No significant past surgical history      AH:    Meds: See med rec    T(C): 36.5 (04-06-21 @ 13:25)  HR: 84 (04-06-21 @ 13:25)  BP: 152/76 (04-06-21 @ 13:25)  RR: 18 (04-06-21 @ 13:25)  SpO2: 98% (04-06-21 @ 13:25)  Wt(kg): --    Gen: NAD  PE L hand:  Evidence of multiple bites on the dorsum of the hand and wrist, most are scabbed over, one is open and is 1 cm in length, expressible fluid, appears slightly purulent  SILT med/rad/ulnar/axillary  +AIN/PIN/Ulnar/Radial/Musc/Median,   Mild pain with ROM of 2nd digit, otherwise full painless ROM  +radial pulse, soft compartments,    Imaging:  XR demonstrating no fractures or dislocations    Procedure: Incision and drainage  Under aseptic conditions, 4cc of 1% lidocaine was used to numb the area around the open wound. A 15 blade scalpel was then used to extend the wound. Hemostat was then used to break up any collections subcutaneously. No pus was expressed, no evidence of deep infection. The wound was then copiously irrigated with normal saline, and then packed and dressed with a sterile dressing. The patient tolerated the procedure well and there we no complications. The patient was neurovascularly intact following the procedure.    A/P: 76yFemale with L dorsal hand collection s/p cat bite, now s/p bedside I+D  - Pain control  - WBAT LUE  - Encourage ROM of fingers and wrist  - Will remove packing on 4/7  - Abx per ID  - care per medicine

## 2021-04-06 NOTE — ED PROVIDER NOTE - OBJECTIVE STATEMENT
77 yo F, h/o HLD, presenting from urgent care after her house cat bit her  on her bilateral hands and wrists on Friday, 4 days ago. She was seen initially at urgent care, 3 days ago, and started on augmentin at the time. She has been taking the antiobiotic and thinks her pain and swelling have mildly improved but when she followed up at urgent care today they said she needed to come to the hospital for likely IV antibiotics. She reports swelling and poor ROM of her left first 3 digits, and purulent drainage from one wound on the dorsum of her left hand. O/w she denies any fevers, chills, chest pain, palpitations, sob, cough, abdominal pain, nausea, diarrhea, numbness or tingling in fingers, or any other complaints at this time.

## 2021-04-06 NOTE — ED PROVIDER NOTE - PROGRESS NOTE DETAILS
MD Dequan PGY-1: Plastics Hand paged, fill f/u. XRs pending. Admitted to medicine, pending ID consult/hand consult

## 2021-04-06 NOTE — CONSULT NOTE ADULT - SUBJECTIVE AND OBJECTIVE BOX
Patient is a 76y old  Female who presents with a chief complaint of cat bite (06 Apr 2021 11:44)    HPI:  75 yo F, h/o HLD, presenting from urgent care after her house cat bit her  on her bilateral hands and wrists on Friday, 4 days ago. She was seen initially at urgent care, 3 days ago, and started on augmentin at the time. She has been taking the antibiotic and thinks her pain and swelling have mildly improved, however she was referred to the ED when she followed up at urgent care. She reports swelling and poor ROM of her left first 3 digits, and purulent drainage from one wound on the dorsum of her left hand. O/w she denies any fevers, chills, chest pain, palpitations, sob, cough, abdominal pain, nausea, diarrhea, numbness or tingling in fingers, or any other complaints at this time.    In ED, T 98.1 HR 62 /88 RR 20 SpO2 96% RA. In ED, given 1 g ceftriaxone. Labs significant for WBC 14.52. Abscess culture w/gram stain sent. Hand surgery consulted.  (06 Apr 2021 11:44)    She was bitten by her cat. Also bitten by the cat 2 years ago with mild infection resolved by antibiotics.  She took 10 pills of 875mg Augmentin since Saturday, did not get better so came to ED.    She completed 2 doses of Pfizer covid vaccine in Jan and Feb 2021.      REVIEW OF SYSTEMS  [  ] ROS unobtainable because:    [ V ] All other systems negative except as noted below    Constitutional:  [ ] fever [V ] chills  [ ] weight loss  [ ]night sweat  [ ]poor appetite/PO intake [ ]fatigue   Skin:  [ ] rash [ ] phlebitis	  Eyes: [ ] icterus [ ] pain  [ ] discharge	  ENMT: [ ] sore throat  [ ] thrush [ ] ulcers [ ] exudates [ ]anosmia  Respiratory: [ ] dyspnea [ ] hemoptysis [ ] cough [ ] sputum	  Cardiovascular:  [ ] chest pain [ ] palpitations [ ] edema	  Gastrointestinal:  [ ] nausea [ ] vomiting [ ] diarrhea [ ] constipation [ ] pain	  Genitourinary:  [ ] dysuria [ ] frequency [ ] hematuria [ ] discharge [ ] flank pain  [ ] incontinence  Musculoskeletal:  [ ] myalgias [V ] arthralgias [ ] arthritis  [ ] back pain  Neurological:  [ ] headache [ ] weakness [ ] seizures  [ ] confusion/altered mental status    prior hospital charts reviewed [V]  primary team notes reviewed [V]  other consultant notes reviewed [V]    PAST MEDICAL & SURGICAL HISTORY:  HLD (hyperlipidemia)    SOCIAL HISTORY:  - Denied smoking/vaping/alcohol/recreational drug use  - Lives alone, has 2 cats    FAMILY HISTORY:  Denies hx    Allergies  No Known Allergies      ANTIMICROBIALS:  doxycycline IVPB    doxycycline IVPB 100 once  doxycycline IVPB 100 every 12 hours  metroNIDAZOLE  IVPB 500 every 8 hours      ANTIMICROBIALS (past 90 days):  MEDICATIONS  (STANDING):  cefTRIAXone   IVPB   100 mL/Hr IV Intermittent (04-06-21 @ 10:24)    metroNIDAZOLE  IVPB   100 mL/Hr IV Intermittent (04-06-21 @ 12:02)        OTHER MEDS:   MEDICATIONS  (STANDING):  atorvastatin 20 at bedtime  enoxaparin Injectable 40 daily      VITALS:  Vital Signs Last 24 Hrs  T(F): 98.3 (04-06-21 @ 10:21), Max: 98.3 (04-06-21 @ 10:21)    Vital Signs Last 24 Hrs  HR: 84 (04-06-21 @ 10:21) (62 - 84)  BP: 142/85 (04-06-21 @ 10:21) (139/88 - 142/85)  RR: 18 (04-06-21 @ 10:21)  SpO2: 97% (04-06-21 @ 10:21) (96% - 97%)  Wt(kg): --    EXAM:  GA: NAD  HEENT: oral cavity no lesion  CV: nl S1/S2, no RMG  Lungs: CTAB  Abd: BS+, soft, nontender, no rebounding pain  Ext: no edema  IV: no phlebitis  Left hand dorsum red, warm, tenderness. There is an opening wound with green pus from left hand dorsum on first MCP joint.    Labs:                        13.5   14.52 )-----------( 236      ( 06 Apr 2021 10:22 )             42.2     04-06    133<L>  |  96  |  17  ----------------------------<  111<H>  3.4<L>   |  21<L>  |  0.95    Ca    10.2      06 Apr 2021 10:22    TPro  8.2  /  Alb  4.4  /  TBili  0.9  /  DBili  x   /  AST  17  /  ALT  14  /  AlkPhos  94  04-06      WBC Trend:  WBC Count: 14.52 (04-06-21 @ 10:22)    Creatine Trend:  Creatinine, Serum: 0.95 (04-06)      Liver Biochemical Testing Trend:  Alanine Aminotransferase (ALT/SGPT): 14 (04-06)  Aspartate Aminotransferase (AST/SGOT): 17 (04-06-21 @ 10:22)  Bilirubin Total, Serum: 0.9 (04-06)    MICROBIOLOGY:          RADIOLOGY:  imaging below personally reviewed     Patient is a 76y old  Female who presents with a chief complaint of cat bite (06 Apr 2021 11:44)    HPI:  75 yo F, h/o HLD, presenting from urgent care after her house cat bit her  on her bilateral hands and wrists on Friday, 4 days ago. She was seen initially at urgent care, 3 days ago, and started on augmentin at the time. She has been taking the antibiotic and thinks her pain and swelling have mildly improved, however she was referred to the ED when she followed up at urgent care. She reports swelling and poor ROM of her left first 3 digits, and purulent drainage from one wound on the dorsum of her left hand. O/w she denies any fevers, chills, chest pain, palpitations, sob, cough, abdominal pain, nausea, diarrhea, numbness or tingling in fingers, or any other complaints at this time.    In ED, T 98.1 HR 62 /88 RR 20 SpO2 96% RA. In ED, given 1 g ceftriaxone. Labs significant for WBC 14.52. Abscess culture w/gram stain sent. Hand surgery consulted.  (06 Apr 2021 11:44)    She was bitten by her cat. Also bitten by the cat 2 years ago with mild infection resolved by antibiotics.  She took 10 pills of 875mg Augmentin since Saturday, did not get better so came to ED.    She completed 2 doses of Pfizer covid vaccine in Jan and Feb 2021.      REVIEW OF SYSTEMS  [  ] ROS unobtainable because:    [ V ] All other systems negative except as noted below    Constitutional:  [ ] fever [V ] chills  [ ] weight loss  [ ]night sweat  [ ]poor appetite/PO intake [ ]fatigue   Skin:  [ ] rash [ ] phlebitis	  Eyes: [ ] icterus [ ] pain  [ ] discharge	  ENMT: [ ] sore throat  [ ] thrush [ ] ulcers [ ] exudates [ ]anosmia  Respiratory: [ ] dyspnea [ ] hemoptysis [ ] cough [ ] sputum	  Cardiovascular:  [ ] chest pain [ ] palpitations [ ] edema	  Gastrointestinal:  [ ] nausea [ ] vomiting [ ] diarrhea [ ] constipation [ ] pain	  Genitourinary:  [ ] dysuria [ ] frequency [ ] hematuria [ ] discharge [ ] flank pain  [ ] incontinence  Musculoskeletal:  [ ] myalgias [V ] arthralgias [ ] arthritis  [ ] back pain  Neurological:  [ ] headache [ ] weakness [ ] seizures  [ ] confusion/altered mental status    prior hospital charts reviewed [V]  primary team notes reviewed [V]  other consultant notes reviewed [V]    PAST MEDICAL & SURGICAL HISTORY:  HLD (hyperlipidemia)    SOCIAL HISTORY:  - Denied smoking/vaping/alcohol/recreational drug use  - Lives alone, has 2 cats    FAMILY HISTORY:  Denies hx    Allergies  No Known Allergies      ANTIMICROBIALS:  doxycycline IVPB    doxycycline IVPB 100 once  doxycycline IVPB 100 every 12 hours  metroNIDAZOLE  IVPB 500 every 8 hours      ANTIMICROBIALS (past 90 days):  MEDICATIONS  (STANDING):  cefTRIAXone   IVPB   100 mL/Hr IV Intermittent (04-06-21 @ 10:24)    metroNIDAZOLE  IVPB   100 mL/Hr IV Intermittent (04-06-21 @ 12:02)        OTHER MEDS:   MEDICATIONS  (STANDING):  atorvastatin 20 at bedtime  enoxaparin Injectable 40 daily      VITALS:  Vital Signs Last 24 Hrs  T(F): 98.3 (04-06-21 @ 10:21), Max: 98.3 (04-06-21 @ 10:21)    Vital Signs Last 24 Hrs  HR: 84 (04-06-21 @ 10:21) (62 - 84)  BP: 142/85 (04-06-21 @ 10:21) (139/88 - 142/85)  RR: 18 (04-06-21 @ 10:21)  SpO2: 97% (04-06-21 @ 10:21) (96% - 97%)  Wt(kg): --    EXAM:  GA: NAD  HEENT: oral cavity no lesion  CV: nl S1/S2, no RMG  Lungs: CTAB  Abd: BS+, soft, nontender, no rebounding pain  Ext: no edema  IV: no phlebitis  Left hand dorsum red, warm, tenderness. There is an opening wound with green pus from left hand dorsum on first MCP joint.    Labs:                        13.5   14.52 )-----------( 236      ( 06 Apr 2021 10:22 )             42.2     04-06    133<L>  |  96  |  17  ----------------------------<  111<H>  3.4<L>   |  21<L>  |  0.95    Ca    10.2      06 Apr 2021 10:22    TPro  8.2  /  Alb  4.4  /  TBili  0.9  /  DBili  x   /  AST  17  /  ALT  14  /  AlkPhos  94  04-06      WBC Trend:  WBC Count: 14.52 (04-06-21 @ 10:22)    Creatine Trend:  Creatinine, Serum: 0.95 (04-06)      Liver Biochemical Testing Trend:  Alanine Aminotransferase (ALT/SGPT): 14 (04-06)  Aspartate Aminotransferase (AST/SGOT): 17 (04-06-21 @ 10:22)  Bilirubin Total, Serum: 0.9 (04-06)    RADIOLOGY:  imaging below personally reviewed    < from: Xray Wrist 3 Views, Left (04.06.21 @ 11:50) >  IMPRESSION:  Diffuse soft tissue swelling. No tracking soft tissue gas collections, radiopaque foreign bodies, or gross radiographic evidence for osteomyelitis.    No fractures or dislocations.    Preserved joint spaces and no joint margin erosions.    Carpal bones normally aligned.    Generalized osteopenia otherwise no discrete lytic or blastic lesions.    < end of copied text >       Patient is a 76y old  Female who presents with a chief complaint of cat bite (06 Apr 2021 11:44)    HPI:  77 yo F, h/o HLD, presenting from urgent care after her house cat bit her  on her bilateral hands and wrists on Friday, 4 days ago. She was seen initially at urgent care, 3 days ago, and started on augmentin at the time. She has been taking the antibiotic and thinks her pain and swelling have mildly improved, however she was referred to the ED when she followed up at urgent care. She reports swelling and poor ROM of her left first 3 digits, and purulent drainage from one wound on the dorsum of her left hand. O/w she denies any fevers, chills, chest pain, palpitations, sob, cough, abdominal pain, nausea, diarrhea, numbness or tingling in fingers, or any other complaints at this time.    In ED, T 98.1 HR 62 /88 RR 20 SpO2 96% RA. In ED, given 1 g ceftriaxone. Labs significant for WBC 14.52. Abscess culture w/gram stain sent. Hand surgery consulted.  (06 Apr 2021 11:44)    She was bitten by her cat. Also bitten by the cat 2 years ago with mild infection resolved by antibiotics.  She took 10 pills of 875mg Augmentin since Saturday, did not get better so came to ED.    She completed 2 doses of Pfizer covid vaccine in Jan and Feb 2021.      REVIEW OF SYSTEMS  [  ] ROS unobtainable because:    [ V ] All other systems negative except as noted below    Constitutional:  [ ] fever [V ] chills  [ ] weight loss  [ ]night sweat  [ ]poor appetite/PO intake [ ]fatigue   Skin:  [ ] rash [ ] phlebitis	  Eyes: [ ] icterus [ ] pain  [ ] discharge	  ENMT: [ ] sore throat  [ ] thrush [ ] ulcers [ ] exudates [ ]anosmia  Respiratory: [ ] dyspnea [ ] hemoptysis [ ] cough [ ] sputum	  Cardiovascular:  [ ] chest pain [ ] palpitations [ ] edema	  Gastrointestinal:  [ ] nausea [ ] vomiting [ ] diarrhea [ ] constipation [ ] pain	  Genitourinary:  [ ] dysuria [ ] frequency [ ] hematuria [ ] discharge [ ] flank pain  [ ] incontinence  Musculoskeletal:  [ ] myalgias [V ] arthralgias [ ] arthritis  [ ] back pain  Neurological:  [ ] headache [ ] weakness [ ] seizures  [ ] confusion/altered mental status    prior hospital charts reviewed [V]  primary team notes reviewed [V]  other consultant notes reviewed [V]    PAST MEDICAL & SURGICAL HISTORY:  HLD (hyperlipidemia)    SOCIAL HISTORY:  - Denied smoking/vaping/alcohol/recreational drug use  - Lives alone, has 2 cats    FAMILY HISTORY:  Denies hx    Allergies  No Known Allergies      ANTIMICROBIALS:  doxycycline IVPB    doxycycline IVPB 100 once  doxycycline IVPB 100 every 12 hours  metroNIDAZOLE  IVPB 500 every 8 hours      ANTIMICROBIALS (past 90 days):  MEDICATIONS  (STANDING):  cefTRIAXone   IVPB   100 mL/Hr IV Intermittent (04-06-21 @ 10:24)    metroNIDAZOLE  IVPB   100 mL/Hr IV Intermittent (04-06-21 @ 12:02)        OTHER MEDS:   MEDICATIONS  (STANDING):  atorvastatin 20 at bedtime  enoxaparin Injectable 40 daily      VITALS:  Vital Signs Last 24 Hrs  T(F): 98.3 (04-06-21 @ 10:21), Max: 98.3 (04-06-21 @ 10:21)    Vital Signs Last 24 Hrs  HR: 84 (04-06-21 @ 10:21) (62 - 84)  BP: 142/85 (04-06-21 @ 10:21) (139/88 - 142/85)  RR: 18 (04-06-21 @ 10:21)  SpO2: 97% (04-06-21 @ 10:21) (96% - 97%)  Wt(kg): --    EXAM:  GA: NAD  HEENT: oral cavity no lesion  CV: nl S1/S2, no RMG  Lungs: CTAB  Abd: BS+, soft, nontender, no rebounding pain  Ext: no edema  IV: no phlebitis  Left hand dorsum red, warm, tenderness. There is an opening wound with green pus from left hand dorsum on first MCP joint. Limited ROM.  Mild cellulitis on right hand as well, but not as bad as left hand  No leg cellulitis    Labs:                        13.5   14.52 )-----------( 236      ( 06 Apr 2021 10:22 )             42.2     04-06    133<L>  |  96  |  17  ----------------------------<  111<H>  3.4<L>   |  21<L>  |  0.95    Ca    10.2      06 Apr 2021 10:22    TPro  8.2  /  Alb  4.4  /  TBili  0.9  /  DBili  x   /  AST  17  /  ALT  14  /  AlkPhos  94  04-06      WBC Trend:  WBC Count: 14.52 (04-06-21 @ 10:22)    Creatine Trend:  Creatinine, Serum: 0.95 (04-06)      Liver Biochemical Testing Trend:  Alanine Aminotransferase (ALT/SGPT): 14 (04-06)  Aspartate Aminotransferase (AST/SGOT): 17 (04-06-21 @ 10:22)  Bilirubin Total, Serum: 0.9 (04-06)    RADIOLOGY:  imaging below personally reviewed    < from: Xray Wrist 3 Views, Left (04.06.21 @ 11:50) >  IMPRESSION:  Diffuse soft tissue swelling. No tracking soft tissue gas collections, radiopaque foreign bodies, or gross radiographic evidence for osteomyelitis.    No fractures or dislocations.    Preserved joint spaces and no joint margin erosions.    Carpal bones normally aligned.    Generalized osteopenia otherwise no discrete lytic or blastic lesions.    < end of copied text >       Patient is a 76y old  Female who presents with a chief complaint of cat bite (06 Apr 2021 11:44)    HPI:  77 yo F, h/o HLD, presenting from urgent care after her house cat bit her  on her bilateral hands and wrists on Friday, 4 days ago. She was seen initially at urgent care, 3 days ago, and started on augmentin at the time. She has been taking the antibiotic and thinks her pain and swelling have mildly improved, however she was referred to the ED when she followed up at urgent care. She reports swelling and poor ROM of her left first 3 digits, and purulent drainage from one wound on the dorsum of her left hand. O/w she denies any fevers, chills, chest pain, palpitations, sob, cough, abdominal pain, nausea, diarrhea, numbness or tingling in fingers, or any other complaints at this time.    In ED, T 98.1 HR 62 /88 RR 20 SpO2 96% RA. In ED, given 1 g ceftriaxone. Labs significant for WBC 14.52. Abscess culture w/gram stain sent. Hand surgery consulted.  (06 Apr 2021 11:44)    She was bitten by her cat. Also bitten by the cat 2 years ago with mild infection resolved by antibiotics.  She took 10 pills of 875mg Augmentin since Saturday, did not get better so came to ED.    She completed 2 doses of Pfizer covid vaccine in Jan and Feb 2021.      REVIEW OF SYSTEMS  [  ] ROS unobtainable because:    [ V ] All other systems negative except as noted below    Constitutional:  [ ] fever [V ] chills  [ ] weight loss  [ ]night sweat  [ ]poor appetite/PO intake [ ]fatigue   Skin:  [ ] rash [ ] phlebitis	  Eyes: [ ] icterus [ ] pain  [ ] discharge	  ENMT: [ ] sore throat  [ ] thrush [ ] ulcers [ ] exudates [ ]anosmia  Respiratory: [ ] dyspnea [ ] hemoptysis [ ] cough [ ] sputum	  Cardiovascular:  [ ] chest pain [ ] palpitations [ ] edema	  Gastrointestinal:  [ ] nausea [ ] vomiting [ ] diarrhea [ ] constipation [ ] pain	  Genitourinary:  [ ] dysuria [ ] frequency [ ] hematuria [ ] discharge [ ] flank pain  [ ] incontinence  Musculoskeletal:  [ ] myalgias [V ] arthralgias [ ] arthritis  [ ] back pain  Neurological:  [ ] headache [ ] weakness [ ] seizures  [ ] confusion/altered mental status    prior hospital charts reviewed [V]  primary team notes reviewed [V]  other consultant notes reviewed [V]    PAST MEDICAL & SURGICAL HISTORY:  HLD (hyperlipidemia)    SOCIAL HISTORY:  - Denied smoking/vaping/alcohol/recreational drug use  - Lives alone, has 2 cats    FAMILY HISTORY:  Denies hx    Allergies  No Known Allergies      ANTIMICROBIALS:  doxycycline IVPB    doxycycline IVPB 100 once  doxycycline IVPB 100 every 12 hours  metroNIDAZOLE  IVPB 500 every 8 hours      ANTIMICROBIALS (past 90 days):  MEDICATIONS  (STANDING):  cefTRIAXone   IVPB   100 mL/Hr IV Intermittent (04-06-21 @ 10:24)    metroNIDAZOLE  IVPB   100 mL/Hr IV Intermittent (04-06-21 @ 12:02)        OTHER MEDS:   MEDICATIONS  (STANDING):  atorvastatin 20 at bedtime  enoxaparin Injectable 40 daily      VITALS:  Vital Signs Last 24 Hrs  T(F): 98.3 (04-06-21 @ 10:21), Max: 98.3 (04-06-21 @ 10:21)    Vital Signs Last 24 Hrs  HR: 84 (04-06-21 @ 10:21) (62 - 84)  BP: 142/85 (04-06-21 @ 10:21) (139/88 - 142/85)  RR: 18 (04-06-21 @ 10:21)  SpO2: 97% (04-06-21 @ 10:21) (96% - 97%)  Wt(kg): --    EXAM:  GA: NAD  HEENT: oral cavity no lesion  CV: nl S1/S2, no RMG  Lungs: CTAB  Abd: BS+, soft, nontender, no rebounding pain  Ext: no edema  IV: no phlebitis  Left hand dorsum red, warm, tenderness. There is an opening wound with green pus from left hand dorsum on first MCP joint. Limited ROM.  Mild cellulitis on right hand as well, but not as bad as left hand  No leg cellulitis    Labs:                        13.5   14.52 )-----------( 236      ( 06 Apr 2021 10:22 )             42.2     04-06    133<L>  |  96  |  17  ----------------------------<  111<H>  3.4<L>   |  21<L>  |  0.95    Ca    10.2      06 Apr 2021 10:22    TPro  8.2  /  Alb  4.4  /  TBili  0.9  /  DBili  x   /  AST  17  /  ALT  14  /  AlkPhos  94  04-06      WBC Trend:  WBC Count: 14.52 (04-06-21 @ 10:22)    Creatine Trend:  Creatinine, Serum: 0.95 (04-06)      Liver Biochemical Testing Trend:  Alanine Aminotransferase (ALT/SGPT): 14 (04-06)  Aspartate Aminotransferase (AST/SGOT): 17 (04-06-21 @ 10:22)  Bilirubin Total, Serum: 0.9 (04-06)    RADIOLOGY:    <The imaging below has been reviewed and visualized by me independently. Findings as detailed in report below>    < from: Xray Wrist 3 Views, Left (04.06.21 @ 11:50) >  IMPRESSION:  Diffuse soft tissue swelling. No tracking soft tissue gas collections, radiopaque foreign bodies, or gross radiographic evidence for osteomyelitis.    No fractures or dislocations.    Preserved joint spaces and no joint margin erosions.    Carpal bones normally aligned.    Generalized osteopenia otherwise no discrete lytic or blastic lesions.    < end of copied text >

## 2021-04-06 NOTE — ED ADULT TRIAGE NOTE - CHIEF COMPLAINT QUOTE
Pt reports bitten by her cat to left hand,  rt thumb and left foot on Friday.  Left hand swelling.  Seen at urgent care, on oral antibiotic, sent in for IV antibiotic.

## 2021-04-06 NOTE — ED ADULT NURSE NOTE - OBJECTIVE STATEMENT
76 y.o F A&Ox3 presents to the ED c.o swelling/redness to L hand s/p animal bite. Pt. reports her cat bit her on Friday - went to urgent care and took Augmentin for 3 days. Reports increased swelling, redness and drainage to L top of hand. Pt. reports swelling has slightly subsided since yesterday. Break in skin to top of hand noted with purulent drainage noted. Pt. reports her cat also bit her near thumb of R hand and L shin. Slight redness to R hand thumb, no swelling or pain. Pt. able to wiggle fingers bilaterally. Reports her cat is fully vaccinated. Denies fevers, dizziness, HA, n/v/d, ABD pain, numbness/tingling sensation at this time. Strong peripheral pulses noted. Safety and comfort provided.

## 2021-04-06 NOTE — ED PROVIDER NOTE - ATTENDING CONTRIBUTION TO CARE
Private Physician Nic Booker  76y female pmh HLD, Comes to ed c/o cat bite 4d ago. Seen at Urgent care and tx w ABX, Now comes to ed referred from same for c/o failure to resolve. No DM, FC, SOB, CP, NVDC, PE WDWN female awake alert nad except left hand swelling red tender warm w drainage over first mc distally. Chest clear anterior & posterior cv no rubs, gallops or murmurs neuro gcs 15 speech fluent power 5/5 all extr pain light touch intact  Favio Wilder MD, Facep

## 2021-04-06 NOTE — H&P ADULT - NSHPPHYSICALEXAM_GEN_ALL_CORE
GENERAL: NAD, well-developed  HEAD:  Atraumatic, Normocephalic  EYES: EOMI, PERRLA, conjunctiva and sclera clear  NECK: Supple, No JVD  CHEST/LUNG: Clear to auscultation bilaterally; No wheezes or rales  HEART: Regular rate and rhythm; No murmurs, rubs, or gallops  ABDOMEN: Soft, Nontender, Nondistended; Bowel sounds present  EXTREMITIES:  2+ Peripheral Pulses, No clubbing, cyanosis, or edema  PSYCH: AAOx3  NEUROLOGY: non-focal  SKIN: No rashes or lesions GENERAL: NAD, well-developed  HEAD:  Atraumatic, Normocephalic  EYES: EOMI, PERRLA, conjunctiva and sclera clear  NECK: Supple, No JVD  CHEST/LUNG: Clear to auscultation bilaterally; No wheezes or rales  HEART: Regular rate and rhythm; No murmurs, rubs, or gallops  ABDOMEN: Soft, Nontender, Nondistended; Bowel sounds present  EXTREMITIES:  2+ Peripheral Pulses, (+) erythema, swelling over left first MC extending over dorsum of thenar eminence and up wrist, with multiple scabbed over lacerations and one open wound with purulent drainage over first MC, (+) mild erythema over right first web space  PSYCH: AAOx3  NEUROLOGY: non-focal  SKIN: No rashes or lesions

## 2021-04-06 NOTE — H&P ADULT - ASSESSMENT
77 yo F, h/o HLD, presenting from urgent care after her house cat bit her  on her bilateral hands and wrists on Friday, 4 days ago. 75 yo F, h/o HLD, presenting from urgent care after her bilateral cat bite 4 days ago, admitted for purulent cellulitis. 75 yo F, h/o HLD, presenting from urgent care after her bilateral cat bite 4 days ago, admitted for purulent cellulitis, started on unasyn and vancomycin.

## 2021-04-06 NOTE — CONSULT NOTE ADULT - ATTENDING COMMENTS
Agree with above see procedure note for details
75 yo F with HLD presented with left hand dorsum cat bite cellulitis after not improving with PO Augmentin for 3 days.    On exam significant edema and erythema over LUE MCP joints and limited ROM  I would check MRI of LUE  Also with bite at RUE between 1st and 2nd digit but with less prominent findings  I would utilize Vancomycin and Unasyn pending cultures    Overall, Cellulitis, Cat Bite, Leukocytosis    I will continue to follow. Please feel free to contact me with any further questions.    Robert Iglesias M.D.  [facility] Division of Infectious Disease  8AM-5PM: Pager Number [phone]  After Hours (or if no response): Please contact the Infectious Diseases Office at [phone]     The above assessment and plan were discussed with medicine team

## 2021-04-06 NOTE — ED PROVIDER NOTE - PHYSICAL EXAMINATION
PHYSICAL EXAM:  GENERAL: Sitting comfortable in bed, in no acute distress  HENMT: Atraumatic, moist mucous membranes, no oropharyngeal exudates or vesicles, uvula is midline EYES: Clear bilaterally, PERRL, EOMs intact b/l  HEART: RRR, S1/S2, no murmur/gallops/rubs  RESPIRATORY: Clear to auscultation bilaterally, no wheezes/rhonchi/rales  ABDOMEN: +BS, soft, nontender, nondistended  EXTREMITIES: No lower extremity edema, +2 radial pulses b/l  NEURO:  A&Ox4, no focal motor deficits or sensory deficits   Heme/LYMPH: No ecchymosis or bruising, no anterior/posterior cervical or supraclavicular LAD  SKIN:  Skin normal color for race, warm, dry and intact. (+) erythema, swelling over left first MC extending over dorsum of thenar eminence and up wrist, with multiple scabbed over lacerations and one open wound with purulent drainage over first MC, (+) mild erythema over right first web space

## 2021-04-06 NOTE — H&P ADULT - PROBLEM SELECTOR PLAN 1
- pt presenting with worsening bilateral hand swelling and erythema after a cat bite 4 days ago while on day 4/10 of augment prescribed from urgent care. Cellulitis worse on left hand. WBC to 14.52. S/p 1 g ceftriaxone in ED  - doxycycline 100 mg q12 and metronidazole 500 mg q8 to cover MRSA and anaerobes  - f/u abscess culture with gram stain  - f/u hand surgery consult  - f/u CXR, left hand/wrist x rays - pt presenting with worsening bilateral hand swelling and erythema after a cat bite 4 days ago while on day 4/10 of augment prescribed from urgent care. Cellulitis worse on left hand. WBC to 14.52. S/p 1 g ceftriaxone in ED  - IV unasyn 3 g q8 and vancomycin 1 g q 24  - left hand x ray with preserved joint spaces and no joint margin erosions.  - f/u ESR/CRP in AM  - f/u abscess culture with gram stain  - f/u hand surgery consult  - f/u MRI left hand - pt presenting with worsening bilateral hand swelling and erythema after a cat bite 4 days ago while on day 4/10 of augmentin prescribed from urgent care. Cellulitis worse on left hand. WBC to 14.52. S/p 1 g ceftriaxone in ED  - IV unasyn 3 g q8 and vancomycin 1 g q 24  - left hand x ray with preserved joint spaces and no joint margin erosions.  - f/u ESR/CRP in AM  - f/u abscess culture with gram stain  - hand surgery consulted; performed I and D  - f/u MRI left hand

## 2021-04-06 NOTE — H&P ADULT - PROBLEM SELECTOR PLAN 3
Transitions of Care Status:  1.  Name of PCP:  2.  PCP Contacted on Admission: [ ] Y    [ ] N    3.  PCP contacted at Discharge: [ ] Y    [ ] N    [ ] N/A  4.  Post-Discharge Appointment Date and Location:  5.  Summary of Handoff given to PCP: - DVT ppx: lovenox

## 2021-04-06 NOTE — H&P ADULT - NSHPLABSRESULTS_GEN_ALL_CORE
04-06    133<L>  |  96  |  17  ----------------------------<  111<H>  3.4<L>   |  21<L>  |  0.95    Ca    10.2      06 Apr 2021 10:22    TPro  8.2  /  Alb  4.4  /  TBili  0.9  /  DBili  x   /  AST  17  /  ALT  14  /  AlkPhos  94  04-06                          13.5   14.52 )-----------( 236      ( 06 Apr 2021 10:22 )             42.2 04-06    133<L>  |  96  |  17  ----------------------------<  111<H>  3.4<L>   |  21<L>  |  0.95    Ca    10.2      06 Apr 2021 10:22    TPro  8.2  /  Alb  4.4  /  TBili  0.9  /  DBili  x   /  AST  17  /  ALT  14  /  AlkPhos  94  04-06                          13.5   14.52 )-----------( 236      ( 06 Apr 2021 10:22 )             42.2    < from: Xray Chest 1 View- PORTABLE-Urgent (Xray Chest 1 View- PORTABLE-Urgent .) (04.06.21 @ 11:51) >    Impression:    The heart is normal insize. The Lungs are clear. No pleural effusion. No pneumothorax. Degenerative changes of the thoracic spine.        < end of copied text >    < from: Xray Wrist 3 Views, Left (04.06.21 @ 11:50) >    IMPRESSION:  Diffuse soft tissue swelling. No tracking soft tissue gas collections, radiopaque foreign bodies, or gross radiographic evidence for osteomyelitis.    No fractures or dislocations.    Preserved joint spaces and no joint margin erosions.    Carpal bones normally aligned.    Generalized osteopenia otherwise no discrete lytic or blastic lesions.      < end of copied text >

## 2021-04-06 NOTE — H&P ADULT - HISTORY OF PRESENT ILLNESS
77 yo F, h/o HLD, presenting from urgent care after her house cat bit her  on her bilateral hands and wrists on Friday, 4 days ago. She was seen initially at urgent care, 3 days ago, and started on augmentin at the time. She has been taking the antibiotic and thinks her pain and swelling have mildly improved, however she was referred to the ED when she followed up at urgent care. She reports swelling and poor ROM of her left first 3 digits, and purulent drainage from one wound on the dorsum of her left hand. O/w she denies any fevers, chills, chest pain, palpitations, sob, cough, abdominal pain, nausea, diarrhea, numbness or tingling in fingers, or any other complaints at this time.    In ED, T 98.1 HR 62 /88 RR 20 SpO2 96% RA. In ED, given 1 g ceftriaxone. Labs significant for WBC 14.52. Abscess culture w/gram stain sent. Hand surgery consulted.

## 2021-04-06 NOTE — H&P ADULT - ATTENDING COMMENTS
-Left hand cellulitis with drainage in setting of recent cat bite. Significant swelling and limited ROM. -ID and hand recs appreciated. -Unasyn and vanco for now. -Will check ESR, CRP, procalcitonin. -MRI of left hand with contrast. -OT eval. -MRSA swab. -Check HbA1c. -Supportive care.

## 2021-04-06 NOTE — H&P ADULT - NSHPREVIEWOFSYSTEMS_GEN_ALL_CORE
REVIEW OF SYSTEMS:    CONSTITUTIONAL: No weakness, fevers or chills  EYES/ENT: No visual changes;  No vertigo or throat pain   NECK: No pain or stiffness  RESPIRATORY: No cough, wheezing, hemoptysis; No shortness of breath  CARDIOVASCULAR: No chest pain or palpitations  GASTROINTESTINAL: No abdominal or epigastric pain. No nausea, vomiting, or hematemesis; No diarrhea or constipation. No melena or hematochezia.  GENITOURINARY: No dysuria, frequency or hematuria  NEUROLOGICAL: No numbness or weakness  SKIN: No itching, burning, rashes, or lesions   PSYCH: No change in mood  All other review of systems is negative unless indicated above.

## 2021-04-07 LAB
A1C WITH ESTIMATED AVERAGE GLUCOSE RESULT: 5.7 % — HIGH (ref 4–5.6)
ALBUMIN SERPL ELPH-MCNC: 3.7 G/DL — SIGNIFICANT CHANGE UP (ref 3.3–5)
ALP SERPL-CCNC: 81 U/L — SIGNIFICANT CHANGE UP (ref 40–120)
ALT FLD-CCNC: 13 U/L — SIGNIFICANT CHANGE UP (ref 10–45)
ANION GAP SERPL CALC-SCNC: 12 MMOL/L — SIGNIFICANT CHANGE UP (ref 5–17)
AST SERPL-CCNC: 14 U/L — SIGNIFICANT CHANGE UP (ref 10–40)
BASOPHILS # BLD AUTO: 0.04 K/UL — SIGNIFICANT CHANGE UP (ref 0–0.2)
BASOPHILS NFR BLD AUTO: 0.4 % — SIGNIFICANT CHANGE UP (ref 0–2)
BILIRUB SERPL-MCNC: 0.6 MG/DL — SIGNIFICANT CHANGE UP (ref 0.2–1.2)
BUN SERPL-MCNC: 14 MG/DL — SIGNIFICANT CHANGE UP (ref 7–23)
CALCIUM SERPL-MCNC: 9.6 MG/DL — SIGNIFICANT CHANGE UP (ref 8.4–10.5)
CHLORIDE SERPL-SCNC: 106 MMOL/L — SIGNIFICANT CHANGE UP (ref 96–108)
CHOLEST SERPL-MCNC: 140 MG/DL — SIGNIFICANT CHANGE UP
CO2 SERPL-SCNC: 22 MMOL/L — SIGNIFICANT CHANGE UP (ref 22–31)
COVID-19 SPIKE DOMAIN AB INTERP: POSITIVE
COVID-19 SPIKE DOMAIN ANTIBODY RESULT: >250 U/ML — HIGH
CREAT SERPL-MCNC: 0.77 MG/DL — SIGNIFICANT CHANGE UP (ref 0.5–1.3)
CRP SERPL-MCNC: 92 MG/L — HIGH (ref 0–4)
EOSINOPHIL # BLD AUTO: 0.08 K/UL — SIGNIFICANT CHANGE UP (ref 0–0.5)
EOSINOPHIL NFR BLD AUTO: 0.8 % — SIGNIFICANT CHANGE UP (ref 0–6)
ERYTHROCYTE [SEDIMENTATION RATE] IN BLOOD: 63 MM/HR — HIGH (ref 0–20)
ESTIMATED AVERAGE GLUCOSE: 117 MG/DL — HIGH (ref 68–114)
GLUCOSE SERPL-MCNC: 103 MG/DL — HIGH (ref 70–99)
HCT VFR BLD CALC: 37.9 % — SIGNIFICANT CHANGE UP (ref 34.5–45)
HDLC SERPL-MCNC: 46 MG/DL — LOW
HGB BLD-MCNC: 12.3 G/DL — SIGNIFICANT CHANGE UP (ref 11.5–15.5)
IMM GRANULOCYTES NFR BLD AUTO: 0.7 % — SIGNIFICANT CHANGE UP (ref 0–1.5)
LIPID PNL WITH DIRECT LDL SERPL: 72 MG/DL — SIGNIFICANT CHANGE UP
LYMPHOCYTES # BLD AUTO: 1.91 K/UL — SIGNIFICANT CHANGE UP (ref 1–3.3)
LYMPHOCYTES # BLD AUTO: 19 % — SIGNIFICANT CHANGE UP (ref 13–44)
MAGNESIUM SERPL-MCNC: 2 MG/DL — SIGNIFICANT CHANGE UP (ref 1.6–2.6)
MCHC RBC-ENTMCNC: 26.8 PG — LOW (ref 27–34)
MCHC RBC-ENTMCNC: 32.5 GM/DL — SIGNIFICANT CHANGE UP (ref 32–36)
MCV RBC AUTO: 82.6 FL — SIGNIFICANT CHANGE UP (ref 80–100)
MONOCYTES # BLD AUTO: 0.95 K/UL — HIGH (ref 0–0.9)
MONOCYTES NFR BLD AUTO: 9.5 % — SIGNIFICANT CHANGE UP (ref 2–14)
NEUTROPHILS # BLD AUTO: 6.99 K/UL — SIGNIFICANT CHANGE UP (ref 1.8–7.4)
NEUTROPHILS NFR BLD AUTO: 69.6 % — SIGNIFICANT CHANGE UP (ref 43–77)
NON HDL CHOLESTEROL: 94 MG/DL — SIGNIFICANT CHANGE UP
NRBC # BLD: 0 /100 WBCS — SIGNIFICANT CHANGE UP (ref 0–0)
PHOSPHATE SERPL-MCNC: 2.9 MG/DL — SIGNIFICANT CHANGE UP (ref 2.5–4.5)
PLATELET # BLD AUTO: 213 K/UL — SIGNIFICANT CHANGE UP (ref 150–400)
POTASSIUM SERPL-MCNC: 3.6 MMOL/L — SIGNIFICANT CHANGE UP (ref 3.5–5.3)
POTASSIUM SERPL-SCNC: 3.6 MMOL/L — SIGNIFICANT CHANGE UP (ref 3.5–5.3)
PROCALCITONIN SERPL-MCNC: 0.07 NG/ML — SIGNIFICANT CHANGE UP (ref 0.02–0.1)
PROT SERPL-MCNC: 6.6 G/DL — SIGNIFICANT CHANGE UP (ref 6–8.3)
RBC # BLD: 4.59 M/UL — SIGNIFICANT CHANGE UP (ref 3.8–5.2)
RBC # FLD: 12.5 % — SIGNIFICANT CHANGE UP (ref 10.3–14.5)
SARS-COV-2 IGG+IGM SERPL QL IA: >250 U/ML — HIGH
SARS-COV-2 IGG+IGM SERPL QL IA: POSITIVE
SODIUM SERPL-SCNC: 140 MMOL/L — SIGNIFICANT CHANGE UP (ref 135–145)
TRIGL SERPL-MCNC: 112 MG/DL — SIGNIFICANT CHANGE UP
WBC # BLD: 10.04 K/UL — SIGNIFICANT CHANGE UP (ref 3.8–10.5)
WBC # FLD AUTO: 10.04 K/UL — SIGNIFICANT CHANGE UP (ref 3.8–10.5)

## 2021-04-07 PROCEDURE — 73220 MRI UPPR EXTREMITY W/O&W/DYE: CPT | Mod: 26,LT

## 2021-04-07 PROCEDURE — 99233 SBSQ HOSP IP/OBS HIGH 50: CPT

## 2021-04-07 PROCEDURE — 99232 SBSQ HOSP IP/OBS MODERATE 35: CPT

## 2021-04-07 RX ADMIN — AMPICILLIN SODIUM AND SULBACTAM SODIUM 200 GRAM(S): 250; 125 INJECTION, POWDER, FOR SUSPENSION INTRAMUSCULAR; INTRAVENOUS at 09:01

## 2021-04-07 RX ADMIN — ENOXAPARIN SODIUM 40 MILLIGRAM(S): 100 INJECTION SUBCUTANEOUS at 12:58

## 2021-04-07 RX ADMIN — AMPICILLIN SODIUM AND SULBACTAM SODIUM 200 GRAM(S): 250; 125 INJECTION, POWDER, FOR SUSPENSION INTRAMUSCULAR; INTRAVENOUS at 18:16

## 2021-04-07 RX ADMIN — AMPICILLIN SODIUM AND SULBACTAM SODIUM 200 GRAM(S): 250; 125 INJECTION, POWDER, FOR SUSPENSION INTRAMUSCULAR; INTRAVENOUS at 05:25

## 2021-04-07 RX ADMIN — ATORVASTATIN CALCIUM 10 MILLIGRAM(S): 80 TABLET, FILM COATED ORAL at 21:06

## 2021-04-07 RX ADMIN — AMPICILLIN SODIUM AND SULBACTAM SODIUM 200 GRAM(S): 250; 125 INJECTION, POWDER, FOR SUSPENSION INTRAMUSCULAR; INTRAVENOUS at 21:06

## 2021-04-07 RX ADMIN — Medication 650 MILLIGRAM(S): at 21:36

## 2021-04-07 RX ADMIN — Medication 250 MILLIGRAM(S): at 18:16

## 2021-04-07 RX ADMIN — ZOLPIDEM TARTRATE 5 MILLIGRAM(S): 10 TABLET ORAL at 22:38

## 2021-04-07 RX ADMIN — Medication 650 MILLIGRAM(S): at 21:06

## 2021-04-07 NOTE — PROGRESS NOTE ADULT - SUBJECTIVE AND OBJECTIVE BOX
SUBJECTIVE:  Patient seen and examined at bedside this AM. No acute events overnight. AF/VSS. Pain well controlled.     OBJECTIVE:     ** VITAL SIGNS / I&O's **    Vital Signs Last 24 Hrs  T(C): 36.8 (07 Apr 2021 04:25), Max: 36.9 (06 Apr 2021 20:34)  T(F): 98.2 (07 Apr 2021 04:25), Max: 98.4 (06 Apr 2021 20:34)  HR: 72 (07 Apr 2021 04:25) (62 - 94)  BP: 125/75 (07 Apr 2021 04:25) (120/76 - 152/76)  BP(mean): --  RR: 18 (07 Apr 2021 04:25) (18 - 20)  SpO2: 96% (07 Apr 2021 04:25) (95% - 98%)      06 Apr 2021 07:01  -  07 Apr 2021 06:17  --------------------------------------------------------  IN:    IV PiggyBack: 250 mL    Oral Fluid: 240 mL  Total IN: 490 mL    OUT:  Total OUT: 0 mL    Total NET: 490 mL        **MEDS**  acetaminophen   Tablet .. 650 milliGRAM(s) Oral every 6 hours PRN  ampicillin/sulbactam  IVPB      ampicillin/sulbactam  IVPB 3 Gram(s) IV Intermittent every 6 hours  atorvastatin 10 milliGRAM(s) Oral at bedtime  enoxaparin Injectable 40 milliGRAM(s) SubCutaneous daily  ibuprofen  Tablet. 400 milliGRAM(s) Oral every 6 hours PRN  vancomycin  IVPB 1000 milliGRAM(s) IV Intermittent every 24 hours  zolpidem 5 milliGRAM(s) Oral at bedtime PRN  zolpidem 5 milliGRAM(s) Oral at bedtime PRN    Gen: NAD  PE L hand:  Evidence of multiple bites on the dorsum of the hand and wrist, most are scabbed over, one is open and is 1 cm in length, expressible fluid, appears slightly purulent  SILT med/rad/ulnar/axillary  +AIN/PIN/Ulnar/Radial/Musc/Median,   Mild pain with ROM of 2nd digit, otherwise full painless ROM  +radial pulse, soft compartments,      ** LABS **                          13.5   14.52 )-----------( 236      ( 06 Apr 2021 10:22 )             42.2     06 Apr 2021 10:22    133    |  96     |  17     ----------------------------<  111    3.4     |  21     |  0.95     Ca    10.2       06 Apr 2021 10:22    TPro  8.2    /  Alb  4.4    /  TBili  0.9    /  DBili  x      /  AST  17     /  ALT  14     /  AlkPhos  94     06 Apr 2021 10:22      CAPILLARY BLOOD GLUCOSE        A/P: 76yFemale with L dorsal hand collection s/p cat bite, now s/p bedside I+D  - Pain control  - WBAT LUE  - Encourage ROM of fingers and wrist  - Abx per ID  - Dressing removed and reapplied this morning, packing removed  - Wet-to-dry dressings BID per nursing care  - care per medicine

## 2021-04-07 NOTE — PROGRESS NOTE ADULT - PROBLEM SELECTOR PLAN 1
- pt presenting with worsening bilateral hand swelling and erythema after a cat bite 4 days ago while on day 4/10 of augmentin prescribed from urgent care. Cellulitis worse on left hand. WBC to 14.52. S/p 1 g ceftriaxone in ED  - IV unasyn 3 g q8 and vancomycin 1 g q 24  - left hand x ray with preserved joint spaces and no joint margin erosions.  - f/u ESR/CRP in AM  - f/u abscess culture with gram stain  - hand surgery consulted; performed I and D 4/6  - f/u MRI left hand to rule out osteomyelitis

## 2021-04-07 NOTE — OCCUPATIONAL THERAPY INITIAL EVALUATION ADULT - PRECAUTIONS/LIMITATIONS, REHAB EVAL
She reports swelling and poor ROM of her left first 3 digits, and purulent drainage from one wound on the dorsum of her left hand. The rest of the cat bite wounds have scabbed over. She denies any fevers, chills, chest pain, palpitations, sob, cough, abdominal pain, nausea, diarrhea, numbness or tingling in fingers, or any other complaints at this time. XR L hand (-). XR L wrist (-). s/p bedside I+D

## 2021-04-07 NOTE — PROGRESS NOTE ADULT - PROBLEM SELECTOR PLAN 2
- pt with reported history of hyperlipidemia on statin  - f/u lipid profile in AM  - c/w statin - pt with reported history of hyperlipidemia on statin  - lipid profile normal  - c/w statin

## 2021-04-07 NOTE — PROGRESS NOTE ADULT - ASSESSMENT
75 yo F with HLD presented with left hand dorsum cat bite cellulitis after not improving with PO Augmentin for 3 days.    On exam significant edema and erythema over LUE MCP joints and limited ROM  Also with bite at RUE between 1st and 2nd digit but with less prominent findings  Hand surgery performed debridement on 4/6 with deeper cultures obtained    Superficial abscess cultures with NGTD  I would utilize Vancomycin and Unasyn pending cultures  I would check MRI of LUE    Overall, Cellulitis, Cat Bite, Leukocytosis    I will continue to follow. Please feel free to contact me with any further questions.    Robert Iglesias M.D.  [facility] Division of Infectious Disease  8AM-5PM: Pager Number [phone]  After Hours (or if no response): Please contact the Infectious Diseases Office at [phone]

## 2021-04-07 NOTE — OCCUPATIONAL THERAPY INITIAL EVALUATION ADULT - PERTINENT HX OF CURRENT PROBLEM, REHAB EVAL
76F, h/o HLD, presenting from urgent care after her house cat bit her on her bilateral hands and wrists on Friday, 4 days ago. She was seen initially at urgent care, 3 days ago, and started on augmentin at the time. She has been taking the antibiotic and thinks her pain and swelling have mildly improved, however she was referred to the ED when she followed up at urgent care.

## 2021-04-07 NOTE — PROGRESS NOTE ADULT - ASSESSMENT
75 yo F, h/o HLD, presenting from urgent care after her bilateral cat bite 4 days ago, admitted for purulent cellulitis, started on unasyn and vancomycin.

## 2021-04-07 NOTE — PROGRESS NOTE ADULT - SUBJECTIVE AND OBJECTIVE BOX
Follow Up:  Cellulitis     Interval History:    REVIEW OF SYSTEMS  [  ] ROS unobtainable because:    [  ] All other systems negative except as noted below    Constitutional:  [ ] fever [ ] chills  [ ] weight loss  [ ] weakness  Skin:  [ ] rash [ ] phlebitis	  Eyes: [ ] icterus [ ] pain  [ ] discharge	  ENMT: [ ] sore throat  [ ] thrush [ ] ulcers [ ] exudates  Respiratory: [ ] dyspnea [ ] hemoptysis [ ] cough [ ] sputum	  Cardiovascular:  [ ] chest pain [ ] palpitations [ ] edema	  Gastrointestinal:  [ ] nausea [ ] vomiting [ ] diarrhea [ ] constipation [ ] pain	  Genitourinary:  [ ] dysuria [ ] frequency [ ] hematuria [ ] discharge [ ] flank pain  [ ] incontinence  Musculoskeletal:  [ ] myalgias [ ] arthralgias [ ] arthritis  [ ] back pain  Neurological:  [ ] headache [ ] seizures  [ ] confusion/altered mental status    Allergies  No Known Allergies        ANTIMICROBIALS:  ampicillin/sulbactam  IVPB    ampicillin/sulbactam  IVPB 3 every 6 hours  vancomycin  IVPB 1000 every 24 hours      OTHER MEDS:  MEDICATIONS  (STANDING):  acetaminophen   Tablet .. 650 every 6 hours PRN  atorvastatin 10 at bedtime  enoxaparin Injectable 40 daily  ibuprofen  Tablet. 400 every 6 hours PRN  zolpidem 5 at bedtime PRN  zolpidem 5 at bedtime PRN      Vital Signs Last 24 Hrs  T(C): 36.9 (07 Apr 2021 12:05), Max: 36.9 (06 Apr 2021 20:34)  T(F): 98.4 (07 Apr 2021 12:05), Max: 98.4 (06 Apr 2021 20:34)  HR: 75 (07 Apr 2021 12:05) (72 - 87)  BP: 109/69 (07 Apr 2021 12:05) (109/69 - 131/80)  BP(mean): --  RR: 18 (07 Apr 2021 12:05) (18 - 19)  SpO2: 96% (07 Apr 2021 12:05) (95% - 96%)    PHYSICAL EXAMINATION:  General: Alert and Awake, NAD  HEENT: PERRL, EOMI  Neck: Supple  Cardiac: RRR, No M/R/G  Resp: CTAB, No Wh/Rh/Ra  Abdomen: NBS, NT/ND, No HSM, No rigidity or guarding  MSK: No LE edema. No Calf tenderness  : No goodman  Skin: No rashes or lesions. Skin is warm and dry to the touch.   Neuro: Alert and Awake. CN 2-12 Grossly intact. Moves all four extremities spontaneously.  Psych: Calm, Pleasant, Cooperative                          12.3   10.04 )-----------( 213      ( 07 Apr 2021 07:16 )             37.9       04-07    140  |  106  |  14  ----------------------------<  103<H>  3.6   |  22  |  0.77    Ca    9.6      07 Apr 2021 07:16  Phos  2.9     04-07  Mg     2.0     04-07    TPro  6.6  /  Alb  3.7  /  TBili  0.6  /  DBili  x   /  AST  14  /  ALT  13  /  AlkPhos  81  04-07          MICROBIOLOGY:  v  .Abscess Arm - Left  04-06-21   No growth to date.  --  --                RADIOLOGY:    <The imaging below has been reviewed and visualized by me independently. Findings as detailed in report below> Follow Up:  Cellulitis     Interval History: afebrile. continued pain at LUE and RUE hands.     REVIEW OF SYSTEMS  [  ] ROS unobtainable because:    [ x ] All other systems negative except as noted below    Constitutional:  [ ] fever [ ] chills  [ ] weight loss  [ ] weakness  Skin:  [ ] rash [ ] phlebitis	  Eyes: [ ] icterus [ ] pain  [ ] discharge	  ENMT: [ ] sore throat  [ ] thrush [ ] ulcers [ ] exudates  Respiratory: [ ] dyspnea [ ] hemoptysis [ ] cough [ ] sputum	  Cardiovascular:  [ ] chest pain [ ] palpitations [ ] edema	  Gastrointestinal:  [ ] nausea [ ] vomiting [ ] diarrhea [ ] constipation [ ] pain	  Genitourinary:  [ ] dysuria [ ] frequency [ ] hematuria [ ] discharge [ ] flank pain  [ ] incontinence  Musculoskeletal:  [ ] myalgias [ ] arthralgias [ ] arthritis  [ ] back pain +hand erythema   Neurological:  [ ] headache [ ] seizures  [ ] confusion/altered mental status    Allergies  No Known Allergies        ANTIMICROBIALS:  ampicillin/sulbactam  IVPB    ampicillin/sulbactam  IVPB 3 every 6 hours  vancomycin  IVPB 1000 every 24 hours      OTHER MEDS:  MEDICATIONS  (STANDING):  acetaminophen   Tablet .. 650 every 6 hours PRN  atorvastatin 10 at bedtime  enoxaparin Injectable 40 daily  ibuprofen  Tablet. 400 every 6 hours PRN  zolpidem 5 at bedtime PRN  zolpidem 5 at bedtime PRN      Vital Signs Last 24 Hrs  T(C): 36.9 (07 Apr 2021 12:05), Max: 36.9 (06 Apr 2021 20:34)  T(F): 98.4 (07 Apr 2021 12:05), Max: 98.4 (06 Apr 2021 20:34)  HR: 75 (07 Apr 2021 12:05) (72 - 87)  BP: 109/69 (07 Apr 2021 12:05) (109/69 - 131/80)  BP(mean): --  RR: 18 (07 Apr 2021 12:05) (18 - 19)  SpO2: 96% (07 Apr 2021 12:05) (95% - 96%)    PHYSICAL EXAMINATION:  General: Alert and Awake, NAD  Neck: Supple  Cardiac: RRR, No M/R/G  Resp: CTAB, No Wh/Rh/Ra  Abdomen: NBS, NT/ND, No HSM, No rigidity or guarding  MSK: L hand with dressing over dorsum of hand with some erythema extending past dressing, R hand with stable erythema of 1st digit. No LE edema. No Calf tenderness  : No goodman  Skin: No rashes or lesions. Skin is warm and dry to the touch.   Neuro: Alert and Awake. CN 2-12 Grossly intact. Moves all four extremities spontaneously.  Psych: Calm, Pleasant, Cooperative                          12.3   10.04 )-----------( 213      ( 07 Apr 2021 07:16 )             37.9       04-07    140  |  106  |  14  ----------------------------<  103<H>  3.6   |  22  |  0.77    Ca    9.6      07 Apr 2021 07:16  Phos  2.9     04-07  Mg     2.0     04-07    TPro  6.6  /  Alb  3.7  /  TBili  0.6  /  DBili  x   /  AST  14  /  ALT  13  /  AlkPhos  81  04-07          MICROBIOLOGY:  v  .Abscess Arm - Left  04-06-21   No growth to date.  --  --    RADIOLOGY:    <The imaging below has been reviewed and visualized by me independently. Findings as detailed in report below>    EXAM:  WRIST COMPLETE LEFT (MIN 3 VIEW)                        EXAM:  HAND 2VIEWS LT                        PROCEDURE DATE:  04/06/2021    Diffuse soft tissue swelling. No tracking soft tissue gas collections, radiopaque foreign bodies, or gross radiographic evidence for osteomyelitis.  No fractures or dislocations.  Preserved joint spaces and no joint margin erosions.  Carpal bones normally aligned.

## 2021-04-07 NOTE — PROGRESS NOTE ADULT - SUBJECTIVE AND OBJECTIVE BOX
Contact Information:  Imelda Do MD,  PGY-1, Internal Medicine  Pager: [phone] ([facility]) /// [phone] ([facility])    SVEN MONK, MRN-[id number]    Patient is a 76y old  Female who presents with a chief complaint of cat bite (07 Apr 2021 06:15)      OVERNIGHT EVENTS:  No acute overnight events. Orthopaedic surgery removing left hand wound packing and reapplied dressing.  SUBJECTIVE:        OBJECTIVE:  Vital Signs Last 24 Hrs  T(C): 36.8 (07 Apr 2021 04:25), Max: 36.9 (06 Apr 2021 20:34)  T(F): 98.2 (07 Apr 2021 04:25), Max: 98.4 (06 Apr 2021 20:34)  HR: 72 (07 Apr 2021 04:25) (62 - 94)  BP: 125/75 (07 Apr 2021 04:25) (120/76 - 152/76)  BP(mean): --  RR: 18 (07 Apr 2021 04:25) (18 - 20)  SpO2: 96% (07 Apr 2021 04:25) (95% - 98%)  I&O's Summary    06 Apr 2021 07:01  -  07 Apr 2021 07:00  --------------------------------------------------------  IN: 490 mL / OUT: 0 mL / NET: 490 mL        MEDICATIONS  (STANDING):  ampicillin/sulbactam  IVPB      ampicillin/sulbactam  IVPB 3 Gram(s) IV Intermittent every 6 hours  atorvastatin 10 milliGRAM(s) Oral at bedtime  enoxaparin Injectable 40 milliGRAM(s) SubCutaneous daily  vancomycin  IVPB 1000 milliGRAM(s) IV Intermittent every 24 hours    MEDICATIONS  (PRN):  acetaminophen   Tablet .. 650 milliGRAM(s) Oral every 6 hours PRN Moderate Pain (4 - 6)  ibuprofen  Tablet. 400 milliGRAM(s) Oral every 6 hours PRN Severe Pain (7 - 10)  zolpidem 5 milliGRAM(s) Oral at bedtime PRN Insomnia  zolpidem 5 milliGRAM(s) Oral at bedtime PRN Insomnia    Allergies    No Known Allergies    Intolerances        CONSTITUTIONAL: No acute distress. Awake and alert.  HEAD: No evidence of trauma. Structures WNL.  EYES: +PERRL. +EOMI. No scleral icterus. No conjunctival injection.  ENT: Moist oral mucosa. No erythema. No pharyngeal exudates.   NECK: Supple. Appropriate ROM. No stiffness. No masses or lymphadenopathy.  RESPIRATORY: CTAB. No wheezes, rales, or rhonchi. No accessory muscle use. No apparent respiratory distress.  CARDIOVASCULAR: +S1/S2. No audible S3/S4. Regular rate and rhythm. No murmurs, rubs, or gallops. 2+ radial pulses x b/l UE; 2+ DP pulses x b/l LE.   GASTROINTESTINAL: Soft, nontender, nondistended. +BS. No rebound or guarding.   BACK: No spinal or paraspinal tenderness. No CVA tenderness.  EXTREMITY: No LE swelling or edema. EXTs warm to touch.  MUSCULOSKELETAL: Spontaneous movement in all extremities.  DERMATOLOGICAL: No abnormal rashes or lesions.  NEUROLOGICAL: CN 2-12 grossly intact. No focal deficits. Sensation intact x 4EXT. A&Ox3 (oriented to person, place, and time).  PSYCHIATRIC: Appropriate affect.                            13.5   14.52 )-----------( 236      ( 06 Apr 2021 10:22 )             42.2       04-06    133<L>  |  96  |  17  ----------------------------<  111<H>  3.4<L>   |  21<L>  |  0.95    Ca    10.2      06 Apr 2021 10:22    TPro  8.2  /  Alb  4.4  /  TBili  0.9  /  DBili  x   /  AST  17  /  ALT  14  /  AlkPhos  94  04-06    CAPILLARY BLOOD GLUCOSE        LIVER FUNCTIONS - ( 06 Apr 2021 10:22 )  Alb: 4.4 g/dL / Pro: 8.2 g/dL / ALK PHOS: 94 U/L / ALT: 14 U/L / AST: 17 U/L / GGT: x                       RADIOLOGY AND ADDITIONAL TESTS:    CONSULTANT NOTES REVIEWED:    CARE DISCUSSED WITH THE FOLLOWING CONSULTANTS/PROVIDERS: Contact Information:  Imelda Do MD,  PGY-1, Internal Medicine  Pager: [phone] ([facility]) /// [phone] ([facility])    SVEN MONK, MRN-[id number]    Patient is a 76y old  Female who presents with a chief complaint of cat bite (07 Apr 2021 06:15)      OVERNIGHT EVENTS:  No acute overnight events. Orthopaedic surgery removing left hand wound packing and reapplied dressing.  SUBJECTIVE:  Pt seen and examined at bedside. Pt reports feeling well and is able to flex her left hand. However, the interdigital space between her first and 2nd digits has become erythematous and swollen      OBJECTIVE:  Vital Signs Last 24 Hrs  T(C): 36.8 (07 Apr 2021 04:25), Max: 36.9 (06 Apr 2021 20:34)  T(F): 98.2 (07 Apr 2021 04:25), Max: 98.4 (06 Apr 2021 20:34)  HR: 72 (07 Apr 2021 04:25) (62 - 94)  BP: 125/75 (07 Apr 2021 04:25) (120/76 - 152/76)  BP(mean): --  RR: 18 (07 Apr 2021 04:25) (18 - 20)  SpO2: 96% (07 Apr 2021 04:25) (95% - 98%)  I&O's Summary    06 Apr 2021 07:01  -  07 Apr 2021 07:00  --------------------------------------------------------  IN: 490 mL / OUT: 0 mL / NET: 490 mL        MEDICATIONS  (STANDING):  ampicillin/sulbactam  IVPB      ampicillin/sulbactam  IVPB 3 Gram(s) IV Intermittent every 6 hours  atorvastatin 10 milliGRAM(s) Oral at bedtime  enoxaparin Injectable 40 milliGRAM(s) SubCutaneous daily  vancomycin  IVPB 1000 milliGRAM(s) IV Intermittent every 24 hours    MEDICATIONS  (PRN):  acetaminophen   Tablet .. 650 milliGRAM(s) Oral every 6 hours PRN Moderate Pain (4 - 6)  ibuprofen  Tablet. 400 milliGRAM(s) Oral every 6 hours PRN Severe Pain (7 - 10)  zolpidem 5 milliGRAM(s) Oral at bedtime PRN Insomnia  zolpidem 5 milliGRAM(s) Oral at bedtime PRN Insomnia    Allergies    No Known Allergies    Intolerances        CONSTITUTIONAL: No acute distress. Awake and alert.  HEAD: No evidence of trauma. Structures WNL.  EYES: +PERRL. +EOMI. No scleral icterus. No conjunctival injection.  ENT: Moist oral mucosa. No erythema. No pharyngeal exudates.   NECK: Supple. Appropriate ROM. No stiffness. No masses or lymphadenopathy.  RESPIRATORY: CTAB. No wheezes, rales, or rhonchi. No accessory muscle use. No apparent respiratory distress.  CARDIOVASCULAR: +S1/S2. No audible S3/S4. Regular rate and rhythm. No murmurs, rubs, or gallops. 2+ radial pulses x b/l UE; 2+ DP pulses x b/l LE.   GASTROINTESTINAL: Soft, nontender, nondistended. +BS. No rebound or guarding.   BACK: No spinal or paraspinal tenderness. No CVA tenderness.  EXTREMITY: No LE swelling or edema. EXTs warm to touch.  MUSCULOSKELETAL: Spontaneous movement in all extremities.  DERMATOLOGICAL: No abnormal rashes or lesions.  NEUROLOGICAL: CN 2-12 grossly intact. No focal deficits. Sensation intact x 4EXT. A&Ox3 (oriented to person, place, and time).  PSYCHIATRIC: Appropriate affect.                            13.5   14.52 )-----------( 236      ( 06 Apr 2021 10:22 )             42.2       04-06    133<L>  |  96  |  17  ----------------------------<  111<H>  3.4<L>   |  21<L>  |  0.95    Ca    10.2      06 Apr 2021 10:22    TPro  8.2  /  Alb  4.4  /  TBili  0.9  /  DBili  x   /  AST  17  /  ALT  14  /  AlkPhos  94  04-06    CAPILLARY BLOOD GLUCOSE        LIVER FUNCTIONS - ( 06 Apr 2021 10:22 )  Alb: 4.4 g/dL / Pro: 8.2 g/dL / ALK PHOS: 94 U/L / ALT: 14 U/L / AST: 17 U/L / GGT: x                       RADIOLOGY AND ADDITIONAL TESTS:    CONSULTANT NOTES REVIEWED:    CARE DISCUSSED WITH THE FOLLOWING CONSULTANTS/PROVIDERS:

## 2021-04-08 LAB
ANION GAP SERPL CALC-SCNC: 13 MMOL/L — SIGNIFICANT CHANGE UP (ref 5–17)
BASOPHILS # BLD AUTO: 0.03 K/UL — SIGNIFICANT CHANGE UP (ref 0–0.2)
BASOPHILS NFR BLD AUTO: 0.3 % — SIGNIFICANT CHANGE UP (ref 0–2)
BUN SERPL-MCNC: 13 MG/DL — SIGNIFICANT CHANGE UP (ref 7–23)
CALCIUM SERPL-MCNC: 9.7 MG/DL — SIGNIFICANT CHANGE UP (ref 8.4–10.5)
CHLORIDE SERPL-SCNC: 105 MMOL/L — SIGNIFICANT CHANGE UP (ref 96–108)
CO2 SERPL-SCNC: 22 MMOL/L — SIGNIFICANT CHANGE UP (ref 22–31)
CREAT SERPL-MCNC: 0.88 MG/DL — SIGNIFICANT CHANGE UP (ref 0.5–1.3)
EOSINOPHIL # BLD AUTO: 0.11 K/UL — SIGNIFICANT CHANGE UP (ref 0–0.5)
EOSINOPHIL NFR BLD AUTO: 1.2 % — SIGNIFICANT CHANGE UP (ref 0–6)
GLUCOSE SERPL-MCNC: 106 MG/DL — HIGH (ref 70–99)
HCT VFR BLD CALC: 36.9 % — SIGNIFICANT CHANGE UP (ref 34.5–45)
HGB BLD-MCNC: 12.1 G/DL — SIGNIFICANT CHANGE UP (ref 11.5–15.5)
IMM GRANULOCYTES NFR BLD AUTO: 1.1 % — SIGNIFICANT CHANGE UP (ref 0–1.5)
LYMPHOCYTES # BLD AUTO: 2.34 K/UL — SIGNIFICANT CHANGE UP (ref 1–3.3)
LYMPHOCYTES # BLD AUTO: 26.1 % — SIGNIFICANT CHANGE UP (ref 13–44)
MCHC RBC-ENTMCNC: 27 PG — SIGNIFICANT CHANGE UP (ref 27–34)
MCHC RBC-ENTMCNC: 32.8 GM/DL — SIGNIFICANT CHANGE UP (ref 32–36)
MCV RBC AUTO: 82.4 FL — SIGNIFICANT CHANGE UP (ref 80–100)
MONOCYTES # BLD AUTO: 0.8 K/UL — SIGNIFICANT CHANGE UP (ref 0–0.9)
MONOCYTES NFR BLD AUTO: 8.9 % — SIGNIFICANT CHANGE UP (ref 2–14)
MRSA PCR RESULT.: SIGNIFICANT CHANGE UP
NEUTROPHILS # BLD AUTO: 5.6 K/UL — SIGNIFICANT CHANGE UP (ref 1.8–7.4)
NEUTROPHILS NFR BLD AUTO: 62.4 % — SIGNIFICANT CHANGE UP (ref 43–77)
NRBC # BLD: 0 /100 WBCS — SIGNIFICANT CHANGE UP (ref 0–0)
PLATELET # BLD AUTO: 212 K/UL — SIGNIFICANT CHANGE UP (ref 150–400)
POTASSIUM SERPL-MCNC: 3.4 MMOL/L — LOW (ref 3.5–5.3)
POTASSIUM SERPL-SCNC: 3.4 MMOL/L — LOW (ref 3.5–5.3)
RBC # BLD: 4.48 M/UL — SIGNIFICANT CHANGE UP (ref 3.8–5.2)
RBC # FLD: 12.2 % — SIGNIFICANT CHANGE UP (ref 10.3–14.5)
S AUREUS DNA NOSE QL NAA+PROBE: SIGNIFICANT CHANGE UP
SODIUM SERPL-SCNC: 140 MMOL/L — SIGNIFICANT CHANGE UP (ref 135–145)
VANCOMYCIN TROUGH SERPL-MCNC: 6.2 UG/ML — LOW (ref 10–20)
WBC # BLD: 8.98 K/UL — SIGNIFICANT CHANGE UP (ref 3.8–10.5)
WBC # FLD AUTO: 8.98 K/UL — SIGNIFICANT CHANGE UP (ref 3.8–10.5)

## 2021-04-08 PROCEDURE — 99233 SBSQ HOSP IP/OBS HIGH 50: CPT

## 2021-04-08 RX ORDER — POTASSIUM CHLORIDE 20 MEQ
40 PACKET (EA) ORAL ONCE
Refills: 0 | Status: COMPLETED | OUTPATIENT
Start: 2021-04-08 | End: 2021-04-08

## 2021-04-08 RX ADMIN — AMPICILLIN SODIUM AND SULBACTAM SODIUM 200 GRAM(S): 250; 125 INJECTION, POWDER, FOR SUSPENSION INTRAMUSCULAR; INTRAVENOUS at 21:38

## 2021-04-08 RX ADMIN — ENOXAPARIN SODIUM 40 MILLIGRAM(S): 100 INJECTION SUBCUTANEOUS at 11:07

## 2021-04-08 RX ADMIN — AMPICILLIN SODIUM AND SULBACTAM SODIUM 200 GRAM(S): 250; 125 INJECTION, POWDER, FOR SUSPENSION INTRAMUSCULAR; INTRAVENOUS at 11:09

## 2021-04-08 RX ADMIN — AMPICILLIN SODIUM AND SULBACTAM SODIUM 200 GRAM(S): 250; 125 INJECTION, POWDER, FOR SUSPENSION INTRAMUSCULAR; INTRAVENOUS at 04:15

## 2021-04-08 RX ADMIN — ATORVASTATIN CALCIUM 10 MILLIGRAM(S): 80 TABLET, FILM COATED ORAL at 21:39

## 2021-04-08 RX ADMIN — Medication 650 MILLIGRAM(S): at 04:16

## 2021-04-08 RX ADMIN — Medication 650 MILLIGRAM(S): at 11:38

## 2021-04-08 RX ADMIN — Medication 40 MILLIEQUIVALENT(S): at 19:04

## 2021-04-08 RX ADMIN — AMPICILLIN SODIUM AND SULBACTAM SODIUM 200 GRAM(S): 250; 125 INJECTION, POWDER, FOR SUSPENSION INTRAMUSCULAR; INTRAVENOUS at 19:03

## 2021-04-08 RX ADMIN — Medication 650 MILLIGRAM(S): at 04:46

## 2021-04-08 RX ADMIN — Medication 650 MILLIGRAM(S): at 11:08

## 2021-04-08 RX ADMIN — Medication 250 MILLIGRAM(S): at 19:03

## 2021-04-08 RX ADMIN — ZOLPIDEM TARTRATE 5 MILLIGRAM(S): 10 TABLET ORAL at 21:39

## 2021-04-08 RX ADMIN — Medication 400 MILLIGRAM(S): at 21:39

## 2021-04-08 NOTE — PROGRESS NOTE ADULT - SUBJECTIVE AND OBJECTIVE BOX
PROGRESS NOTE:   Authoted by Dr. Matthias Randolph MD  Pager [phone] [facility], [id number] [facility]     Patient is a 76y old  Female who presents with a chief complaint of cat bite (08 Apr 2021 06:36)      SUBJECTIVE / OVERNIGHT EVENTS: The patient was seen and examined at bedside. No acute events overnight. the patient continues to have some improvement in swelling and mobility. She went for hand MRI last night.     REVIEW OF SYSTEMS:    CONSTITUTIONAL: No weakness, fevers or chills  EYES/ENT: No visual changes;  No vertigo or throat pain   NECK: No pain or stiffness  RESPIRATORY: No cough, wheezing, hemoptysis; No shortness of breath  CARDIOVASCULAR: No chest pain or palpitations  GASTROINTESTINAL: No abdominal or epigastric pain. No nausea, vomiting, or hematemesis; No diarrhea or constipation. No melena or hematochezia.  GENITOURINARY: No dysuria, frequency or hematuria  NEUROLOGICAL: No numbness or weakness  SKIN: No itching, rashes    MEDICATIONS  (STANDING):  ampicillin/sulbactam  IVPB      ampicillin/sulbactam  IVPB 3 Gram(s) IV Intermittent every 6 hours  atorvastatin 10 milliGRAM(s) Oral at bedtime  enoxaparin Injectable 40 milliGRAM(s) SubCutaneous daily  vancomycin  IVPB 1000 milliGRAM(s) IV Intermittent every 24 hours    MEDICATIONS  (PRN):  acetaminophen   Tablet .. 650 milliGRAM(s) Oral every 6 hours PRN Moderate Pain (4 - 6)  ibuprofen  Tablet. 400 milliGRAM(s) Oral every 6 hours PRN Severe Pain (7 - 10)  zolpidem 5 milliGRAM(s) Oral at bedtime PRN Insomnia  zolpidem 5 milliGRAM(s) Oral at bedtime PRN Insomnia      CAPILLARY BLOOD GLUCOSE        I&O's Summary    07 Apr 2021 07:01  -  08 Apr 2021 07:00  --------------------------------------------------------  IN: 360 mL / OUT: 0 mL / NET: 360 mL        PHYSICAL EXAM:  Vital Signs Last 24 Hrs  T(C): 36.6 (08 Apr 2021 04:52), Max: 36.9 (07 Apr 2021 20:34)  T(F): 97.8 (08 Apr 2021 04:52), Max: 98.4 (07 Apr 2021 20:34)  HR: 66 (08 Apr 2021 04:52) (66 - 81)  BP: 111/71 (08 Apr 2021 04:52) (111/71 - 114/73)  BP(mean): --  RR: 18 (08 Apr 2021 04:52) (18 - 19)  SpO2: 98% (08 Apr 2021 04:52) (97% - 98%)    CONSTITUTIONAL: NAD, well-developed  RESPIRATORY: Normal respiratory effort; lungs are clear to auscultation bilaterally  CARDIOVASCULAR: Regular rate and rhythm, normal S1 and S2, no murmur/rub/gallop; No lower extremity edema; Peripheral pulses are 2+ bilaterally  ABDOMEN: Nontender to palpation, normoactive bowel sounds, no rebound/guarding; No hepatosplenomegaly  MUSCLOSKELETAL: no clubbing or cyanosis of digits; no joint swelling or tenderness to palpation  EXTREMITIES: left hand swelling with erythema, tenderness to palpation. Right hand with erythema, no purulence.   PSYCH: A+O to person, place, and time; affect appropriate    LABS:                        12.1   8.98  )-----------( 212      ( 08 Apr 2021 06:49 )             36.9     04-08    140  |  105  |  13  ----------------------------<  106<H>  3.4<L>   |  22  |  0.88    Ca    9.7      08 Apr 2021 06:49  Phos  2.9     04-07  Mg     2.0     04-07    TPro  6.6  /  Alb  3.7  /  TBili  0.6  /  DBili  x   /  AST  14  /  ALT  13  /  AlkPhos  81  04-07    Culture - Abscess with Gram Stain (collected 07 Apr 2021 00:37)  Source: .Abscess Arm - Left  Preliminary Report (07 Apr 2021 20:29):    No growth    Culture - Abscess with Gram Stain (collected 06 Apr 2021 14:55)  Source: .Abscess Arm - Left  Preliminary Report (07 Apr 2021 11:11):    No growth to date.        RADIOLOGY & ADDITIONAL TESTS:  Results Reviewed:   Imaging Personally Reviewed:  Electrocardiogram Personally Reviewed:    COORDINATION OF CARE:  Care Discussed with Consultants/Other Providers [Y/N]:  Prior or Outpatient Records Reviewed [Y/N]:

## 2021-04-08 NOTE — PROGRESS NOTE ADULT - SUBJECTIVE AND OBJECTIVE BOX
Orthopaedic Surgery Progress Note    Subjective:   Patient seen and examined at bedside this AM. No acute events overnight. AF/VSS. Pain well controlled.    Objective:  T(C): 36.6 (04-08-21 @ 04:52), Max: 36.9 (04-07-21 @ 12:05)  HR: 66 (04-08-21 @ 04:52) (66 - 81)  BP: 111/71 (04-08-21 @ 04:52) (109/69 - 114/73)  RR: 18 (04-08-21 @ 04:52) (18 - 19)  SpO2: 98% (04-08-21 @ 04:52) (96% - 98%)  Wt(kg): --    04-06 @ 07:01  -  04-07 @ 07:00  --------------------------------------------------------  IN: 490 mL / OUT: 0 mL / NET: 490 mL    04-07 @ 07:01  -  04-08 @ 06:36  --------------------------------------------------------  IN: 360 mL / OUT: 0 mL / NET: 360 mL      Gen: NAD  PE L hand:  Evidence of multiple bites on the dorsum of the hand and wrist, most are scabbed over, one is open and is 1 cm in length, dressing c/d/i  SILT med/rad/ulnar/axillary  +AIN/PIN/Ulnar/Radial/Musc/Median  Mild pain with ROM of 2nd digit, otherwise full painless ROM  +radial pulse, soft compartments                          12.3   10.04 )-----------( 213      ( 07 Apr 2021 07:16 )             37.9     04-07    140  |  106  |  14  ----------------------------<  103<H>  3.6   |  22  |  0.77    Ca    9.6      07 Apr 2021 07:16  Phos  2.9     04-07  Mg     2.0     04-07    TPro  6.6  /  Alb  3.7  /  TBili  0.6  /  DBili  x   /  AST  14  /  ALT  13  /  AlkPhos  81  04-07        A/P: 76yFemale with L dorsal hand collection s/p cat bite, now s/p bedside I+D  - Pain control  - WBAT LUE  - Encourage ROM of fingers and wrist  - Abx per ID  - Dressing removed and reapplied this morning, packing removed  - Wet-to-dry dressings BID per nursing care  - F/U MRI results  - care per medicine

## 2021-04-08 NOTE — PROGRESS NOTE ADULT - SUBJECTIVE AND OBJECTIVE BOX
Follow Up:  Cellulitis / ?OM / Cat Bite     Interval History: afebrile. continued induration and pain around RUE hand.     REVIEW OF SYSTEMS  [  ] ROS unobtainable because:    [ x ] All other systems negative except as noted below    Constitutional:  [ ] fever [ ] chills  [ ] weight loss  [ ] weakness  Skin:  [ ] rash [ ] phlebitis	  Eyes: [ ] icterus [ ] pain  [ ] discharge	  ENMT: [ ] sore throat  [ ] thrush [ ] ulcers [ ] exudates  Respiratory: [ ] dyspnea [ ] hemoptysis [ ] cough [ ] sputum	  Cardiovascular:  [ ] chest pain [ ] palpitations [ ] edema	  Gastrointestinal:  [ ] nausea [ ] vomiting [ ] diarrhea [ ] constipation [ ] pain	  Genitourinary:  [ ] dysuria [ ] frequency [ ] hematuria [ ] discharge [ ] flank pain  [ ] incontinence  Musculoskeletal:  [ ] myalgias [ ] arthralgias [ ] arthritis  [ ] back pain +hand erythema   Neurological:  [ ] headache [ ] seizures  [ ] confusion/altered mental status    Allergies  No Known Allergies        ANTIMICROBIALS:  ampicillin/sulbactam  IVPB    ampicillin/sulbactam  IVPB 3 every 6 hours  vancomycin  IVPB 1000 every 24 hours      OTHER MEDS:  MEDICATIONS  (STANDING):  acetaminophen   Tablet .. 650 every 6 hours PRN  atorvastatin 10 at bedtime  enoxaparin Injectable 40 daily  ibuprofen  Tablet. 400 every 6 hours PRN  zolpidem 5 at bedtime PRN  zolpidem 5 at bedtime PRN      Vital Signs Last 24 Hrs  T(C): 36.5 (08 Apr 2021 13:26), Max: 36.9 (07 Apr 2021 20:34)  T(F): 97.7 (08 Apr 2021 13:26), Max: 98.4 (07 Apr 2021 20:34)  HR: 86 (08 Apr 2021 13:26) (66 - 86)  BP: 120/65 (08 Apr 2021 13:26) (111/71 - 120/65)  BP(mean): --  RR: 18 (08 Apr 2021 04:52) (18 - 19)  SpO2: 98% (08 Apr 2021 04:52) (97% - 98%)    PHYSICAL EXAMINATION:  General: Alert and Awake, NAD  Neck: Supple  Cardiac: RRR, No M/R/G  Resp: CTAB, No Wh/Rh/Ra  Abdomen: NBS, NT/ND, No HSM, No rigidity or guarding  MSK: L hand with dressing over dorsum of hand with some erythema extending past dressing, R hand with stable erythema of 1st digit.   : No goodman  Skin: No rashes or lesions. Skin is warm and dry to the touch.   Neuro: Alert and Awake. CN 2-12 Grossly intact. Moves all four extremities spontaneously.  Psych: Calm, Pleasant, Cooperative                          12.1   8.98  )-----------( 212      ( 08 Apr 2021 06:49 )             36.9       04-08    140  |  105  |  13  ----------------------------<  106<H>  3.4<L>   |  22  |  0.88    Ca    9.7      08 Apr 2021 06:49  Phos  2.9     04-07  Mg     2.0     04-07    TPro  6.6  /  Alb  3.7  /  TBili  0.6  /  DBili  x   /  AST  14  /  ALT  13  /  AlkPhos  81  04-07          MICROBIOLOGY:  v  .Abscess Arm - Left  04-07-21   No growth  --  --      .Abscess Arm - Left  04-06-21   No growth to date.  --  --    RADIOLOGY:    <The imaging below has been reviewed and visualized by me independently. Findings as detailed in report below>    EXAM:  MR HAND WAW IC LT                        PROCEDURE DATE:  04/07/2021    1. Moderate synovitis of the second and third metacarpal phalangeal joints with faint marrow edema and marrow enhancement that may be reactive or reflect early osteomyelitis.  2. 0.4 x 0.8 cm nonenhancing collection extends along the volar radial margin of the second metacarpal neck that likely communicates with the second metacarpal phalangeal joint and is concerning for a small abscess and septic joint.  3. Severe subcutaneous edema with enhancement consistent with cellulitis and developing abscess measuring 1.5 x 1.4 x 0.9 cm dorsal to the third metacarpal phalangeal joint.  4. Similar 1.0 x 0.3 x 0.8 cm developing subcutaneous abscess appears to be present dorsal to the second metacarpal phalangeal joint.

## 2021-04-08 NOTE — PROGRESS NOTE ADULT - ASSESSMENT
75 yo F with HLD presented with left hand dorsum cat bite cellulitis after not improving with PO Augmentin for 3 days.    On exam significant edema and erythema over LUE MCP joints and limited ROM  Also with bite at RUE between 1st and 2nd digit but with less prominent findings  Hand surgery performed debridement on 4/6 with deeper cultures obtained    MRI LUE Hand with:  > Moderate synovitis of the second and third metacarpal phalangeal joints with faint marrow edema and marrow enhancement (?Osteomyelitis)  >There is also several apparent abscesses imaged in the hand    Anticipate patient will need long term (6 week course) of antibiotics via PICC line given findings of possible Osteomyelitis and Septic Arthritis    Superficial and I&D abscess cultures with NGTD  I would utilize Vancomycin and Unasyn pending cultures    Overall, Cellulitis, Cat Bite, Leukocytosis, Osteomyelitis, Therapeutic Drug Monitoring    --Followup on further Hand Surgery recommendations regarding MRI results (and possible plans for further debridement)  --Continue Vancomycin 1g IV Q24H. Check trough prior to third dose. Target trough of 10-15  --Continue Unasyn 3g IV Q6H  --Continue to follow CBC with diff  --Continue to follow temperature curve  --Follow up on preliminary blood cultures  --Followup on preliminary abscess cultures     I will continue to follow. Please feel free to contact me with any further questions.    Robert Iglesias M.D.  [facility] Division of Infectious Disease  8AM-5PM: Pager Number [phone]  After Hours (or if no response): Please contact the Infectious Diseases Office at [phone]     The above assessment and plan were discussed with Hand Surgery Resident, Medicine Resident

## 2021-04-08 NOTE — PROGRESS NOTE ADULT - ASSESSMENT
77 yo F, h/o HLD, presenting from urgent care after her bilateral cat bite 4 days ago, admitted for purulent cellulitis, started on unasyn and vancomycin.

## 2021-04-08 NOTE — CHART NOTE - NSCHARTNOTEFT_GEN_A_CORE
Patient re-evaluated. No fevers. Erythema over dorsum of hand over 2/3 metacarpals w/o worsening margins. Induration but no palpable areas of fluctuance. No expressible purulence. MRI of the left hand reviewed which demonstrates nonspecific areas of edema, w/ some enhancement over 2nd/3rd metacarpals without clear area/pocket of collections. We will continue to closely monitor patient on IV antibiotics. Continue BID wet to dry dressing changes. We will re-examine this evening.  Case discussed with Dr. Felix.
This report was requested by: Ligia Marcos | Reference #: [id number]    Others' Prescriptions  Patient Name: Magy He Date: 1945  Address: [address] [place] [address] [address] [place], [place] [address]Utf: Female  Rx Written	Rx Dispensed	Drug	Quantity	Days Supply	Prescriber Name	Payment Method	Dispenser  06/23/2020	09/09/2020	zolpidem tartrate 10 mg tablet	30	30	Nic Booker MD	Insurance	[facility] #[id number]  06/23/2020	08/10/2020	zolpidem tartrate 10 mg tablet	30	30	Nic Booker MD	Insurance	[facility] #[id number]  06/23/2020	06/25/2020	zolpidem tartrate 10 mg tablet	30	30	Nic Booker MD	Insurance	[facility] #[id number]

## 2021-04-08 NOTE — PROGRESS NOTE ADULT - PROBLEM SELECTOR PLAN 1
- pt presenting with worsening bilateral hand swelling and erythema after a cat bite 4 days ago while on day 4/10 of augmentin prescribed from urgent care. Cellulitis worse on left hand. WBC to 14.52. S/p 1 g ceftriaxone in ED  - IV unasyn 3 g q8 and vancomycin 1 g q 24  - left hand x ray with preserved joint spaces and no joint margin erosions.  - f/u ESR/CRP in AM  - f/u abscess culture with gram stain  - hand surgery consulted; performed I and D 4/6  - MR completes showed some collections concerning for abscess, no current osteo however could be too early.

## 2021-04-09 ENCOUNTER — TRANSCRIPTION ENCOUNTER (OUTPATIENT)
Age: 76
End: 2021-04-09

## 2021-04-09 DIAGNOSIS — R19.7 DIARRHEA, UNSPECIFIED: ICD-10-CM

## 2021-04-09 LAB
ALBUMIN SERPL ELPH-MCNC: 3.3 G/DL — SIGNIFICANT CHANGE UP (ref 3.3–5)
ALP SERPL-CCNC: 74 U/L — SIGNIFICANT CHANGE UP (ref 40–120)
ALT FLD-CCNC: 10 U/L — SIGNIFICANT CHANGE UP (ref 10–45)
ANION GAP SERPL CALC-SCNC: 12 MMOL/L — SIGNIFICANT CHANGE UP (ref 5–17)
AST SERPL-CCNC: 14 U/L — SIGNIFICANT CHANGE UP (ref 10–40)
BILIRUB SERPL-MCNC: 0.3 MG/DL — SIGNIFICANT CHANGE UP (ref 0.2–1.2)
BUN SERPL-MCNC: 14 MG/DL — SIGNIFICANT CHANGE UP (ref 7–23)
CALCIUM SERPL-MCNC: 9.3 MG/DL — SIGNIFICANT CHANGE UP (ref 8.4–10.5)
CHLORIDE SERPL-SCNC: 108 MMOL/L — SIGNIFICANT CHANGE UP (ref 96–108)
CO2 SERPL-SCNC: 22 MMOL/L — SIGNIFICANT CHANGE UP (ref 22–31)
CREAT SERPL-MCNC: 0.84 MG/DL — SIGNIFICANT CHANGE UP (ref 0.5–1.3)
GLUCOSE SERPL-MCNC: 95 MG/DL — SIGNIFICANT CHANGE UP (ref 70–99)
HCT VFR BLD CALC: 34.6 % — SIGNIFICANT CHANGE UP (ref 34.5–45)
HGB BLD-MCNC: 11.3 G/DL — LOW (ref 11.5–15.5)
MAGNESIUM SERPL-MCNC: 1.9 MG/DL — SIGNIFICANT CHANGE UP (ref 1.6–2.6)
MCHC RBC-ENTMCNC: 26.9 PG — LOW (ref 27–34)
MCHC RBC-ENTMCNC: 32.7 GM/DL — SIGNIFICANT CHANGE UP (ref 32–36)
MCV RBC AUTO: 82.4 FL — SIGNIFICANT CHANGE UP (ref 80–100)
NRBC # BLD: 0 /100 WBCS — SIGNIFICANT CHANGE UP (ref 0–0)
PHOSPHATE SERPL-MCNC: 3.2 MG/DL — SIGNIFICANT CHANGE UP (ref 2.5–4.5)
PLATELET # BLD AUTO: 216 K/UL — SIGNIFICANT CHANGE UP (ref 150–400)
POTASSIUM SERPL-MCNC: 3.9 MMOL/L — SIGNIFICANT CHANGE UP (ref 3.5–5.3)
POTASSIUM SERPL-SCNC: 3.9 MMOL/L — SIGNIFICANT CHANGE UP (ref 3.5–5.3)
PROT SERPL-MCNC: 6.2 G/DL — SIGNIFICANT CHANGE UP (ref 6–8.3)
RBC # BLD: 4.2 M/UL — SIGNIFICANT CHANGE UP (ref 3.8–5.2)
RBC # FLD: 12.3 % — SIGNIFICANT CHANGE UP (ref 10.3–14.5)
SODIUM SERPL-SCNC: 142 MMOL/L — SIGNIFICANT CHANGE UP (ref 135–145)
WBC # BLD: 9.11 K/UL — SIGNIFICANT CHANGE UP (ref 3.8–10.5)
WBC # FLD AUTO: 9.11 K/UL — SIGNIFICANT CHANGE UP (ref 3.8–10.5)

## 2021-04-09 PROCEDURE — 99233 SBSQ HOSP IP/OBS HIGH 50: CPT

## 2021-04-09 PROCEDURE — 76882 US LMTD JT/FCL EVL NVASC XTR: CPT | Mod: 26,RT

## 2021-04-09 PROCEDURE — 71045 X-RAY EXAM CHEST 1 VIEW: CPT | Mod: 26

## 2021-04-09 PROCEDURE — 99232 SBSQ HOSP IP/OBS MODERATE 35: CPT

## 2021-04-09 RX ORDER — ERTAPENEM SODIUM 1 G/1
1000 INJECTION, POWDER, LYOPHILIZED, FOR SOLUTION INTRAMUSCULAR; INTRAVENOUS ONCE
Refills: 0 | Status: COMPLETED | OUTPATIENT
Start: 2021-04-09 | End: 2021-04-09

## 2021-04-09 RX ORDER — CEFTRIAXONE 500 MG/1
2000 INJECTION, POWDER, FOR SOLUTION INTRAMUSCULAR; INTRAVENOUS EVERY 24 HOURS
Refills: 0 | Status: DISCONTINUED | OUTPATIENT
Start: 2021-04-10 | End: 2021-04-12

## 2021-04-09 RX ORDER — ERTAPENEM SODIUM 1 G/1
INJECTION, POWDER, LYOPHILIZED, FOR SOLUTION INTRAMUSCULAR; INTRAVENOUS
Refills: 0 | Status: DISCONTINUED | OUTPATIENT
Start: 2021-04-09 | End: 2021-04-09

## 2021-04-09 RX ORDER — LACTOBACILLUS ACIDOPHILUS 100MM CELL
1 CAPSULE ORAL THREE TIMES A DAY
Refills: 0 | Status: DISCONTINUED | OUTPATIENT
Start: 2021-04-09 | End: 2021-04-10

## 2021-04-09 RX ADMIN — ZOLPIDEM TARTRATE 5 MILLIGRAM(S): 10 TABLET ORAL at 21:34

## 2021-04-09 RX ADMIN — ATORVASTATIN CALCIUM 10 MILLIGRAM(S): 80 TABLET, FILM COATED ORAL at 21:28

## 2021-04-09 RX ADMIN — Medication 650 MILLIGRAM(S): at 19:27

## 2021-04-09 RX ADMIN — Medication 1 TABLET(S): at 10:22

## 2021-04-09 RX ADMIN — AMPICILLIN SODIUM AND SULBACTAM SODIUM 200 GRAM(S): 250; 125 INJECTION, POWDER, FOR SUSPENSION INTRAMUSCULAR; INTRAVENOUS at 06:30

## 2021-04-09 RX ADMIN — Medication 650 MILLIGRAM(S): at 19:57

## 2021-04-09 RX ADMIN — ENOXAPARIN SODIUM 40 MILLIGRAM(S): 100 INJECTION SUBCUTANEOUS at 11:43

## 2021-04-09 RX ADMIN — Medication 1 TABLET(S): at 21:28

## 2021-04-09 RX ADMIN — ERTAPENEM SODIUM 1000 MILLIGRAM(S): 1 INJECTION, POWDER, LYOPHILIZED, FOR SOLUTION INTRAMUSCULAR; INTRAVENOUS at 11:42

## 2021-04-09 NOTE — PROGRESS NOTE ADULT - ASSESSMENT
75 yo F with HLD presented with left hand dorsum cat bite cellulitis after not improving with PO Augmentin for 3 days.    On exam significant edema and erythema over LUE MCP joints and limited ROM  Also with bite at RUE between 1st and 2nd digit but with less prominent findings  Hand surgery performed debridement on 4/6 with deeper cultures obtained    MRI LUE Hand with:  > Moderate synovitis of the second and third metacarpal phalangeal joints with faint marrow edema and marrow enhancement (?Osteomyelitis)  >There is also several apparent abscesses imaged in the hand    Superficial and I&D abscess cultures with NGTD  Doubt Staph aureus given this  I would deescalate to Ceftriaxone (which would cover coverage for mouth troy anaerobes and Pasteurella)  will need long term (6 week course) of antibiotics via PICC line given findings of possible Osteomyelitis and Septic Arthritis      Overall, Cellulitis, Cat Bite, Leukocytosis, Osteomyelitis, Therapeutic Drug Monitoring    --Stop Vancomycin and Unasyn  --Recommend PICC line  --Recommend Ceftriaxone 2g IV Q24H through 5/19/21  --Recommend CBC with Diff and CMP qWeekly while on antimicrobials. Please have results faxed to [phone]  --Follow up on preliminary blood cultures  --Followup on preliminary abscess cultures     I will be away over this upcoming weekend. Please contact the Infectious Diseases Office with any further questions or concerns.     Robert Iglesias M.D.  [facility] Division of Infectious Disease  8AM-5PM: Pager Number [phone]  After Hours (or if no response): Please contact the Infectious Diseases Office at [phone]     The above assessment and plan were discussed with Dr Brian Archuleta

## 2021-04-09 NOTE — DISCHARGE NOTE PROVIDER - NSDCMRMEDTOKEN_GEN_ALL_CORE_FT
atorvastatin 10 mg oral tablet: 1 tab(s) orally once a day   atorvastatin 10 mg oral tablet: 1 tab(s) orally once a day  cefTRIAXone: 2 gram(s) intravenously once a day  IV Ceftriaxone: IV Ceftriaxone 2 grams every 24 hours through May 19, 2021  lactobacillus acidophilus oral capsule: 1 tab(s) orally 2 times a day   Weekly Labs: CBC with Diff and CMP qWeekly while on antimicrobials.    Please have results faxed to [phone]   atorvastatin 10 mg oral tablet: 1 tab(s) orally once a day  cefTRIAXone: 2 gram(s) intravenously once a day  IV Ceftriaxone: IV Ceftriaxone 2 grams every 24 hours through May 19, 2021  Weekly Labs: CBC with Diff and CMP qWeekly while on antimicrobials.    Please have results faxed to [phone]   atorvastatin 10 mg oral tablet: 1 tab(s) orally once a day  IV Ceftriaxone: IV Ceftriaxone 2 grams every 24 hours through May 19, 2021  Weekly Labs: CBC with Diff and CMP qWeekly while on antimicrobials.    Please have results faxed to [phone]

## 2021-04-09 NOTE — DISCHARGE NOTE PROVIDER - NSDCFUADDAPPT_GEN_ALL_CORE_FT
Please follow up with Dr. Iglesias in 1 week.  Please obtain weekly CBC with differential and CMP from a lab through May 19.  ***************Please have results faxed to [phone]

## 2021-04-09 NOTE — PROGRESS NOTE ADULT - PROBLEM SELECTOR PLAN 3
- DVT ppx: lovenox - pt with reported history of hyperlipidemia on statin  - lipid profile normal  - c/w statin

## 2021-04-09 NOTE — PROGRESS NOTE ADULT - PROBLEM SELECTOR PLAN 2
- pt with reported history of hyperlipidemia on statin  - lipid profile normal  - c/w statin - pt complaining of multiple episodes of water, non-foul smell diarrhea since starting abx w/no leukocytosis or toxic appearance  - f/u stool cultures and GI PCR - pt complaining of multiple episodes of water, non-foul smell diarrhea since starting abx w/no leukocytosis or toxic appearance  - start lactobacillus TID  - f/u stool cultures and GI PCR

## 2021-04-09 NOTE — PROGRESS NOTE ADULT - SUBJECTIVE AND OBJECTIVE BOX
Contact Information:  Imelda Do MD,  PGY-1, Internal Medicine  Pager: [phone] ([facility]) /// [phone] ([facility])    SVEN MONK, [id number]    Patient is a 76y old  Female who presents with a chief complaint of cat bite (09 Apr 2021 06:34)      OVERNIGHT EVENTS:  No acute overnight events.  SUBJECTIVE:        OBJECTIVE:  Vital Signs Last 24 Hrs  T(C): 36.7 (09 Apr 2021 04:27), Max: 37.3 (08 Apr 2021 20:53)  T(F): 98.1 (09 Apr 2021 04:27), Max: 99.1 (08 Apr 2021 20:53)  HR: 64 (09 Apr 2021 04:27) (64 - 86)  BP: 112/70 (09 Apr 2021 04:27) (112/70 - 130/78)  BP(mean): --  RR: 18 (09 Apr 2021 04:27) (18 - 18)  SpO2: 96% (09 Apr 2021 04:27) (96% - 97%)  I&O's Summary    08 Apr 2021 07:01  -  09 Apr 2021 07:00  --------------------------------------------------------  IN: 400 mL / OUT: 0 mL / NET: 400 mL        MEDICATIONS  (STANDING):  ampicillin/sulbactam  IVPB      ampicillin/sulbactam  IVPB 3 Gram(s) IV Intermittent every 6 hours  atorvastatin 10 milliGRAM(s) Oral at bedtime  enoxaparin Injectable 40 milliGRAM(s) SubCutaneous daily  vancomycin  IVPB 1000 milliGRAM(s) IV Intermittent every 24 hours    MEDICATIONS  (PRN):  acetaminophen   Tablet .. 650 milliGRAM(s) Oral every 6 hours PRN Moderate Pain (4 - 6)  ibuprofen  Tablet. 400 milliGRAM(s) Oral every 6 hours PRN Severe Pain (7 - 10)  zolpidem 5 milliGRAM(s) Oral at bedtime PRN Insomnia  zolpidem 5 milliGRAM(s) Oral at bedtime PRN Insomnia    Allergies    No Known Allergies    Intolerances    CONSTITUTIONAL: NAD, well-developed  RESPIRATORY: Normal respiratory effort; lungs are clear to auscultation bilaterally  CARDIOVASCULAR: Regular rate and rhythm, normal S1 and S2, no murmur/rub/gallop; No lower extremity edema; Peripheral pulses are 2+ bilaterally  ABDOMEN: Nontender to palpation, normoactive bowel sounds, no rebound/guarding; No hepatosplenomegaly  MUSCLOSKELETAL: no clubbing or cyanosis of digits; no joint swelling or tenderness to palpation  EXTREMITIES: left hand swelling with erythema, tenderness to palpation. Right hand with erythema, no purulence.   PSYCH: A+O to person, place, and time; affect appropriate                        12.1   8.98  )-----------( 212      ( 08 Apr 2021 06:49 )             36.9       04-08    140  |  105  |  13  ----------------------------<  106<H>  3.4<L>   |  22  |  0.88    Ca    9.7      08 Apr 2021 06:49      CAPILLARY BLOOD GLUCOSE      Culture - Abscess with Gram Stain (collected 07 Apr 2021 00:37)  Source: .Abscess Arm - Left  Preliminary Report (07 Apr 2021 20:29):    No growth    Culture - Abscess with Gram Stain (collected 06 Apr 2021 14:55)  Source: .Abscess Arm - Left  Preliminary Report (07 Apr 2021 11:11):    No growth to date.      < from: MR [place] w/wo IV Cont, Left (04.07.21 @ 22:32) >  IMPRESSION:  1. Moderate synovitis of the second and third metacarpal phalangeal joints with faint marrow edema and marrow enhancement that may be reactive or reflect early osteomyelitis.  2. 0.4 x 0.8 cm nonenhancing collection extends along the volar radial margin of the second metacarpal neck that likely communicates with the second metacarpal phalangeal joint and is concerning for a small abscess and septic joint.  3. Severe subcutaneous edema with enhancement consistent with cellulitis and developing abscess measuring 1.5 x 1.4 x 0.9 cm dorsal to the third metacarpal phalangeal joint.  4. Similar 1.0 x 0.3 x 0.8 cm developing subcutaneous abscess appears to be present dorsal to the second metacarpal phalangeal joint.  5. Findings discussed with Dr. Randolph on 4/8/2021 9:07 AM.      < end of copied text >    RADIOLOGY AND ADDITIONAL TESTS:    CONSULTANT NOTES REVIEWED:    CARE DISCUSSED WITH THE FOLLOWING CONSULTANTS/PROVIDERS: Contact Information:  Imelda Do MD,  PGY-1, Internal Medicine  Pager: [phone] ([facility]) /// [phone] ([facility])    SVEN MONK, MRN-[id number]    Patient is a 76y old  Female who presents with a chief complaint of cat bite (09 Apr 2021 06:34)      OVERNIGHT EVENTS:  No acute overnight events.  SUBJECTIVE:  Pt seen and examined at bedside. She reports improved swelling and range of motion of her left hand. She endorses >3 episodes of diarrhea over the past 24 hours. Denies cough, fevers, chills, n/v, CP, SOB, palpitations.      OBJECTIVE:  Vital Signs Last 24 Hrs  T(C): 36.7 (09 Apr 2021 04:27), Max: 37.3 (08 Apr 2021 20:53)  T(F): 98.1 (09 Apr 2021 04:27), Max: 99.1 (08 Apr 2021 20:53)  HR: 64 (09 Apr 2021 04:27) (64 - 86)  BP: 112/70 (09 Apr 2021 04:27) (112/70 - 130/78)  BP(mean): --  RR: 18 (09 Apr 2021 04:27) (18 - 18)  SpO2: 96% (09 Apr 2021 04:27) (96% - 97%)  I&O's Summary    08 Apr 2021 07:01  -  09 Apr 2021 07:00  --------------------------------------------------------  IN: 400 mL / OUT: 0 mL / NET: 400 mL        MEDICATIONS  (STANDING):  ampicillin/sulbactam  IVPB      ampicillin/sulbactam  IVPB 3 Gram(s) IV Intermittent every 6 hours  atorvastatin 10 milliGRAM(s) Oral at bedtime  enoxaparin Injectable 40 milliGRAM(s) SubCutaneous daily  vancomycin  IVPB 1000 milliGRAM(s) IV Intermittent every 24 hours    MEDICATIONS  (PRN):  acetaminophen   Tablet .. 650 milliGRAM(s) Oral every 6 hours PRN Moderate Pain (4 - 6)  ibuprofen  Tablet. 400 milliGRAM(s) Oral every 6 hours PRN Severe Pain (7 - 10)  zolpidem 5 milliGRAM(s) Oral at bedtime PRN Insomnia  zolpidem 5 milliGRAM(s) Oral at bedtime PRN Insomnia    Allergies    No Known Allergies    Intolerances    CONSTITUTIONAL: NAD, well-developed  RESPIRATORY: Normal respiratory effort; lungs are clear to auscultation bilaterally  CARDIOVASCULAR: Regular rate and rhythm, normal S1 and S2, no murmur/rub/gallop; No lower extremity edema; Peripheral pulses are 2+ bilaterally  ABDOMEN: Nontender to palpation, normoactive bowel sounds, no rebound/guarding; No hepatosplenomegaly  MUSCLOSKELETAL: no clubbing or cyanosis of digits; no joint swelling or tenderness to palpation  EXTREMITIES: left hand swelling with erythema, tenderness to palpation. Right hand with erythema, no purulence.   PSYCH: A+O to person, place, and time; affect appropriate                        12.1   8.98  )-----------( 212      ( 08 Apr 2021 06:49 )             36.9       04-08    140  |  105  |  13  ----------------------------<  106<H>  3.4<L>   |  22  |  0.88    Ca    9.7      08 Apr 2021 06:49      CAPILLARY BLOOD GLUCOSE      Culture - Abscess with Gram Stain (collected 07 Apr 2021 00:37)  Source: .Abscess Arm - Left  Preliminary Report (07 Apr 2021 20:29):    No growth    Culture - Abscess with Gram Stain (collected 06 Apr 2021 14:55)  Source: .Abscess Arm - Left  Preliminary Report (07 Apr 2021 11:11):    No growth to date.      < from: MR Hand w/wo IV Cont, Left (04.07.21 @ 22:32) >  IMPRESSION:  1. Moderate synovitis of the second and third metacarpal phalangeal joints with faint marrow edema and marrow enhancement that may be reactive or reflect early osteomyelitis.  2. 0.4 x 0.8 cm nonenhancing collection extends along the volar radial margin of the second metacarpal neck that likely communicates with the second metacarpal phalangeal joint and is concerning for a small abscess and septic joint.  3. Severe subcutaneous edema with enhancement consistent with cellulitis and developing abscess measuring 1.5 x 1.4 x 0.9 cm dorsal to the third metacarpal phalangeal joint.  4. Similar 1.0 x 0.3 x 0.8 cm developing subcutaneous abscess appears to be present dorsal to the second metacarpal phalangeal joint.  5. Findings discussed with Dr. Randolph on 4/8/2021 9:07 AM.      < end of copied text >    RADIOLOGY AND ADDITIONAL TESTS:    CONSULTANT NOTES REVIEWED:    CARE DISCUSSED WITH THE FOLLOWING CONSULTANTS/PROVIDERS:

## 2021-04-09 NOTE — DISCHARGE NOTE PROVIDER - CARE PROVIDERS DIRECT ADDRESSES
[email or web address]

## 2021-04-09 NOTE — PROGRESS NOTE ADULT - PROBLEM SELECTOR PLAN 1
- pt presenting with worsening bilateral hand swelling and erythema after a cat bite 4 days ago while on day 4/10 of augmentin prescribed from urgent care. Cellulitis worse on left hand. WBC to 14.52. S/p 1 g ceftriaxone in ED  - IV unasyn 3 g q8 and vancomycin 1 g q 24  - left hand x ray with preserved joint spaces and no joint margin erosions.  - ESR/CRP elevated  - abscess culture with gram stain NGTD  - hand surgery consulted; performed I and D 4/6  - MR completes showed some collections concerning for abscess, no current osteo however could be too early. - pt presenting with worsening bilateral hand swelling and erythema after a cat bite 4 days ago while on day 4/10 of augmentin prescribed from urgent care. Cellulitis worse on left hand. WBC to 14.52. S/p 1 g ceftriaxone in ED  - IV unasyn 3 g q8 and vancomycin 1 g q 24 discontinued; switched to 1 g ertapenem q24 hrs through 5/19/2021  - picc line ordered  - left hand x ray with preserved joint spaces and no joint margin erosions.  - ESR/CRP elevated  - abscess culture with gram stain NGTD  - hand surgery consulted; performed I and D 4/6  - MR completes showed some collections concerning for abscess, no current osteo however could be too early. - pt presenting with worsening bilateral hand swelling and erythema after a cat bite 4 days ago while on day 4/10 of augmentin prescribed from urgent care. Cellulitis worse on left hand. WBC to 14.52. S/p 1 g ceftriaxone in ED  - IV unasyn 3 g q8 and vancomycin 1 g q 24 discontinued; switched to 1 g ertapenem q24 hrs through 5/19/2021  - picc line ordered  - left hand x ray with preserved joint spaces and no joint margin erosions.  - ESR/CRP elevated  - abscess culture with gram stain NGTD  - hand surgery consulted; performed I and D 4/6  - MR completes showed some collections concerning for abscess, no current osteo however could be too early.  - f/u right hand US as it has not improved as dramatically as left hand

## 2021-04-09 NOTE — PROGRESS NOTE ADULT - ASSESSMENT
75 yo F, h/o HLD, presenting from urgent care after her bilateral cat bite 4 days ago, admitted for purulent cellulitis, unasyn and vancomycin. MRI findings c/w cellulitis and early osteomyelitis.

## 2021-04-09 NOTE — DISCHARGE NOTE PROVIDER - NSDCCPCAREPLAN_GEN_ALL_CORE_FT
PRINCIPAL DISCHARGE DIAGNOSIS  Diagnosis: Cellulitis  Assessment and Plan of Treatment: You came into the hospital with bilateral hand swelling from being bitten by your cat, leading to a skin infection called cellulitis. Purulent drainage was noted from your left hand, and a beside debridement of your left hand was performed by a hand surgeon. Infectious disease was consulted, and you were started on IV antibiotics to prevent systemic infection. Imaging of your left hand revealed possible early osteomyelitis, which refers to an infection of the bone that is treated with IV antibiotics. A peripheral IV line was placed in the hospital to allow you to continue yoru antibiotics at home.       PRINCIPAL DISCHARGE DIAGNOSIS  Diagnosis: Cellulitis  Assessment and Plan of Treatment: You came into the hospital with bilateral hand swelling from being bitten by your cat, leading to a skin infection called cellulitis. Purulent drainage was noted from your left hand, and a beside debridement of your left hand was performed by a hand surgeon. Infectious disease was consulted, and you were started on IV antibiotics to prevent systemic infection. Imaging of your left hand revealed possible early osteomyelitis, which refers to an infection of the bone that is treated with IV antibiotics. A culture of your abscess was negative, and the swelling of both your hand improved with antibiotics in the hospital. Because of the nature of your infection, a total of 6 week of antibiotics is required for treatment. A peripheral IV line was placed in the hospital to allow you to continue your antibiotics at home. Please take IV ceftriaxone 2 grams every 24 hours through May 19, 2021 and obtain weekly labs until your end date. Please follow up with Dr. Booker and Dr. Iglesias within 1 week of discharge for further management.      SECONDARY DISCHARGE DIAGNOSES  Diagnosis: Diarrhea  Assessment and Plan of Treatment: You developed watery diarrhea most likely due to starting IV antibiotics. You were started on a probiotic (lactobacillus) which helped improve your diarrhea in the hospital. You may continue to take probiotics as needed.     PRINCIPAL DISCHARGE DIAGNOSIS  Diagnosis: Cellulitis  Assessment and Plan of Treatment: You came into the hospital with bilateral hand swelling from being bitten by your cat, leading to a skin infection called cellulitis. Purulent drainage was noted from your left hand, and a beside debridement of your left hand was performed by a hand surgeon. Infectious disease was consulted, and you were started on IV antibiotics to prevent systemic infection. Imaging of your left hand revealed possible early osteomyelitis, which refers to an infection of the bone that is treated with IV antibiotics. A culture of your abscess was negative, and the swelling of both your hand improved with antibiotics in the hospital. Because of the nature of your infection, a total of 6 week of antibiotics is required for treatment. A peripheral IV line was placed in the hospital to allow you to continue your antibiotics at home. Please take IV ceftriaxone 2 grams every 24 hours through May 19, 2021 and obtain weekly labs until your end date. Please follow up with Dr. Booker and Dr. Iglesias within 1 week of discharge for further management.      SECONDARY DISCHARGE DIAGNOSES  Diagnosis: Diarrhea  Assessment and Plan of Treatment: You developed watery diarrhea most likely due to starting IV antibiotics. Your diarrhea resolved in the hospital.     PRINCIPAL DISCHARGE DIAGNOSIS  Diagnosis: Cellulitis  Assessment and Plan of Treatment: You came into the hospital with bilateral hand swelling from being bitten by your cat, leading to a skin infection called cellulitis. Purulent drainage was noted from your left hand, and a beside debridement of your left hand was performed by a hand surgeon. Infectious disease was consulted, and you were started on IV antibiotics to prevent systemic infection. Imaging of your left hand revealed possible early osteomyelitis, which refers to an infection of the bone that is treated with IV antibiotics. A culture of your abscess was negative, and the swelling of both your hand improved with antibiotics in the hospital. Because of the nature of your infection, a total of 6 week of antibiotics is required for treatment. A peripheral IV line was placed in the hospital to allow you to continue your antibiotics at home. Please take IV ceftriaxone 2 grams every 24 hours through May 19, 2021 and obtain weekly labs until your end date. Please have results faxed to [phone]. Please follow up with Dr. Booker and Dr. Iglesias within 1 week of discharge for further management.      SECONDARY DISCHARGE DIAGNOSES  Diagnosis: Diarrhea  Assessment and Plan of Treatment: You developed watery diarrhea most likely due to starting IV antibiotics. Your diarrhea improved. Please take probiotic yogurt or kefir yogurt as these have bacteria that may help your gut troy to minimize the diarrhea. Please follow up with Dr. Booker and Dr. Iglesias.     PRINCIPAL DISCHARGE DIAGNOSIS  Diagnosis: Cellulitis  Assessment and Plan of Treatment: You came into the hospital with bilateral hand swelling from being bitten by your cat, leading to a skin infection called cellulitis. Purulent drainage was noted from your left hand, and a beside debridement of your left hand was performed by a hand surgeon. Infectious disease was consulted, and you were started on IV antibiotics to prevent systemic infection. Imaging of your left hand revealed possible early osteomyelitis, which refers to an infection of the bone that is treated with IV antibiotics. A culture of your abscess was negative, and the swelling of both your hand improved with antibiotics in the hospital. Because of the nature of your infection, a total of 6 week of antibiotics is required for treatment. A peripheral IV line was placed in the hospital to allow you to continue your antibiotics at home. Please take IV ceftriaxone 2 grams every 24 hours through May 19, 2021 and obtain weekly labs until your end date. Please have results faxed to [phone]. Please follow up with Dr. Booker and Dr. Iglesias within 1 week of discharge for further management.  Please call your PCP if you experience a fever, palpitations, dizziness, nausea.vomiting, pus or uncontrolled bleeding from your wounds. If you are unable to reach your providers or get another cat/dog bite, please return to the ED.      SECONDARY DISCHARGE DIAGNOSES  Diagnosis: Diarrhea  Assessment and Plan of Treatment: You developed watery diarrhea most likely due to starting IV antibiotics. Your diarrhea improved. Please take probiotic yogurt or kefir yogurt as these have bacteria that may help your gut troy to minimize the diarrhea. Please follow up with Dr. Booker and Dr. Iglesias.  Please return to the ED if your experience fevers, abdominal pain, foul-smelling diarrhea, blood in the stool, or lack of bowel movements for multiple days.

## 2021-04-09 NOTE — DISCHARGE NOTE PROVIDER - CARE PROVIDER_API CALL
Nic Booker)  Internal Medicine; Pulmonary Disease  [address] [place] [address] [address]  [place], [place] [id number]  Phone: [phone]  Fax: [phone]  Follow Up Time:     Robert Iglesias)  Infectious Disease; Internal Medicine  [address] [place] [address]  Phone: [phone]  Fax: [phone]  Follow Up Time:

## 2021-04-09 NOTE — PROGRESS NOTE ADULT - SUBJECTIVE AND OBJECTIVE BOX
Follow Up:  Cat Bite, OM     Interval History:    REVIEW OF SYSTEMS  [  ] ROS unobtainable because:    [  ] All other systems negative except as noted below    Constitutional:  [ ] fever [ ] chills  [ ] weight loss  [ ] weakness  Skin:  [ ] rash [ ] phlebitis	  Eyes: [ ] icterus [ ] pain  [ ] discharge	  ENMT: [ ] sore throat  [ ] thrush [ ] ulcers [ ] exudates  Respiratory: [ ] dyspnea [ ] hemoptysis [ ] cough [ ] sputum	  Cardiovascular:  [ ] chest pain [ ] palpitations [ ] edema	  Gastrointestinal:  [ ] nausea [ ] vomiting [ ] diarrhea [ ] constipation [ ] pain	  Genitourinary:  [ ] dysuria [ ] frequency [ ] hematuria [ ] discharge [ ] flank pain  [ ] incontinence  Musculoskeletal:  [ ] myalgias [ ] arthralgias [ ] arthritis  [ ] back pain  Neurological:  [ ] headache [ ] seizures  [ ] confusion/altered mental status    Allergies  No Known Allergies        ANTIMICROBIALS:  ertapenem  IVPB    ertapenem  IVPB 1000 once      OTHER MEDS:  MEDICATIONS  (STANDING):  acetaminophen   Tablet .. 650 every 6 hours PRN  atorvastatin 10 at bedtime  enoxaparin Injectable 40 daily  ibuprofen  Tablet. 400 every 6 hours PRN  zolpidem 5 at bedtime PRN  zolpidem 5 at bedtime PRN      Vital Signs Last 24 Hrs  T(C): 36.7 (09 Apr 2021 04:27), Max: 37.3 (08 Apr 2021 20:53)  T(F): 98.1 (09 Apr 2021 04:27), Max: 99.1 (08 Apr 2021 20:53)  HR: 64 (09 Apr 2021 04:27) (64 - 86)  BP: 112/70 (09 Apr 2021 04:27) (112/70 - 130/78)  BP(mean): --  RR: 18 (09 Apr 2021 04:27) (18 - 18)  SpO2: 96% (09 Apr 2021 04:27) (96% - 97%)    PHYSICAL EXAMINATION:  General: Alert and Awake, NAD  HEENT: PERRL, EOMI  Neck: Supple  Cardiac: RRR, No M/R/G  Resp: CTAB, No Wh/Rh/Ra  Abdomen: NBS, NT/ND, No HSM, No rigidity or guarding  MSK: No LE edema. No Calf tenderness  : No goodman  Skin: No rashes or lesions. Skin is warm and dry to the touch.   Neuro: Alert and Awake. CN 2-12 Grossly intact. Moves all four extremities spontaneously.  Psych: Calm, Pleasant, Cooperative                          11.3   9.11  )-----------( 216      ( 09 Apr 2021 07:11 )             34.6       04-09    142  |  108  |  14  ----------------------------<  95  3.9   |  22  |  0.84    Ca    9.3      09 Apr 2021 07:11  Phos  3.2     04-09  Mg     1.9     04-09    TPro  6.2  /  Alb  3.3  /  TBili  0.3  /  DBili  x   /  AST  14  /  ALT  10  /  AlkPhos  74  04-09          MICROBIOLOGY:  Vancomycin Level, Trough: 6.2 ug/mL (04-08-21 @ 18:11)  v  .Abscess Arm - Left  04-07-21   No growth  --  --      .Abscess Arm - Left  04-06-21   No growth to date.  --  --                RADIOLOGY:    <The imaging below has been reviewed and visualized by me independently. Findings as detailed in report below> Follow Up:  Cat Bite, OM     Interval History: afebrile. erythema and pain is improving.     REVIEW OF SYSTEMS  [  ] ROS unobtainable because:    [ x ] All other systems negative except as noted below    Constitutional:  [ ] fever [ ] chills  [ ] weight loss  [ ] weakness  Skin:  [ ] rash [ ] phlebitis	  Eyes: [ ] icterus [ ] pain  [ ] discharge	  ENMT: [ ] sore throat  [ ] thrush [ ] ulcers [ ] exudates  Respiratory: [ ] dyspnea [ ] hemoptysis [ ] cough [ ] sputum	  Cardiovascular:  [ ] chest pain [ ] palpitations [ ] edema	  Gastrointestinal:  [ ] nausea [ ] vomiting [ ] diarrhea [ ] constipation [ ] pain	  Genitourinary:  [ ] dysuria [ ] frequency [ ] hematuria [ ] discharge [ ] flank pain  [ ] incontinence  Musculoskeletal:  [ ] myalgias [ ] arthralgias [ ] arthritis  [ ] back pain +hand erythema   Neurological:  [ ] headache [ ] seizures  [ ] confusion/altered mental status      Allergies  No Known Allergies        ANTIMICROBIALS:  ertapenem  IVPB    ertapenem  IVPB 1000 once      OTHER MEDS:  MEDICATIONS  (STANDING):  acetaminophen   Tablet .. 650 every 6 hours PRN  atorvastatin 10 at bedtime  enoxaparin Injectable 40 daily  ibuprofen  Tablet. 400 every 6 hours PRN  zolpidem 5 at bedtime PRN  zolpidem 5 at bedtime PRN      Vital Signs Last 24 Hrs  T(C): 36.7 (09 Apr 2021 04:27), Max: 37.3 (08 Apr 2021 20:53)  T(F): 98.1 (09 Apr 2021 04:27), Max: 99.1 (08 Apr 2021 20:53)  HR: 64 (09 Apr 2021 04:27) (64 - 86)  BP: 112/70 (09 Apr 2021 04:27) (112/70 - 130/78)  BP(mean): --  RR: 18 (09 Apr 2021 04:27) (18 - 18)  SpO2: 96% (09 Apr 2021 04:27) (96% - 97%)    PHYSICAL EXAMINATION:  General: Alert and Awake, NAD  Neck: Supple  Cardiac: RRR, No M/R/G  Resp: CTAB, No Wh/Rh/Ra  Abdomen: NBS, NT/ND, No HSM, No rigidity or guarding  MSK: L hand with dressing over dorsum of hand with some erythema extending past dressing, R hand with stable erythema of 1st digit.   : No goodman  Skin: No rashes or lesions. Skin is warm and dry to the touch.   Neuro: Alert and Awake. CN 2-12 Grossly intact. Moves all four extremities spontaneously.  Psych: Calm, Pleasant, Cooperative                            11.3   9.11  )-----------( 216      ( 09 Apr 2021 07:11 )             34.6       04-09    142  |  108  |  14  ----------------------------<  95  3.9   |  22  |  0.84    Ca    9.3      09 Apr 2021 07:11  Phos  3.2     04-09  Mg     1.9     04-09    TPro  6.2  /  Alb  3.3  /  TBili  0.3  /  DBili  x   /  AST  14  /  ALT  10  /  AlkPhos  74  04-09          MICROBIOLOGY:  Vancomycin Level, Trough: 6.2 ug/mL (04-08-21 @ 18:11)  v  .Abscess Arm - Left  04-07-21   No growth  --  --      .Abscess Arm - Left  04-06-21   No growth to date.  --  --    RADIOLOGY:    <The imaging below has been reviewed and visualized by me independently. Findings as detailed in report below>    EXAM:  MR HAND WAW IC LT                        PROCEDURE DATE:  04/07/2021    1. Moderate synovitis of the second and third metacarpal phalangeal joints with faint marrow edema and marrow enhancement that may be reactive or reflect early osteomyelitis.  2. 0.4 x 0.8 cm nonenhancing collection extends along the volar radial margin of the second metacarpal neck that likely communicates with the second metacarpal phalangeal joint and is concerning for a small abscess and septic joint.  3. Severe subcutaneous edema with enhancement consistent with cellulitis and developing abscess measuring 1.5 x 1.4 x 0.9 cm dorsal to the third metacarpal phalangeal joint.  4. Similar 1.0 x 0.3 x 0.8 cm developing subcutaneous abscess appears to be present dorsal to the second metacarpal phalangeal joint.

## 2021-04-09 NOTE — PROGRESS NOTE ADULT - SUBJECTIVE AND OBJECTIVE BOX
Orthopaedic Surgery Progress Note    Patient seen and examined this morning. No acute events overnight. Pain well controlled. Tolerating diet. Denies N/V/D/F/C.     Objective:  Vital Signs Last 24 Hrs  T(C): 36.7 (09 Apr 2021 04:27), Max: 37.3 (08 Apr 2021 20:53)  T(F): 98.1 (09 Apr 2021 04:27), Max: 99.1 (08 Apr 2021 20:53)  HR: 64 (09 Apr 2021 04:27) (64 - 86)  BP: 112/70 (09 Apr 2021 04:27) (112/70 - 130/78)  BP(mean): --  RR: 18 (09 Apr 2021 04:27) (18 - 18)  SpO2: 96% (09 Apr 2021 04:27) (96% - 97%)      Gen: NAD  PE L hand:  Evidence of multiple bites on the dorsum of the hand and wrist, most are scabbed over, one is open and is 0.5 cm in length, dressing c/d/i, pain around open wound much improved  SILT med/rad/ulnar/axillary  +AIN/PIN/Ulnar/Radial/Musc/Median  Mild pain with ROM of 2nd digit, otherwise full painless ROM  +radial pulse, soft compartments                                           12.1   8.98  )-----------( 212      ( 08 Apr 2021 06:49 )             36.9   04-08    140  |  105  |  13  ----------------------------<  106<H>  3.4<L>   |  22  |  0.88    Ca    9.7      08 Apr 2021 06:49  Phos  2.9     04-07  Mg     2.0     04-07    TPro  6.6  /  Alb  3.7  /  TBili  0.6  /  DBili  x   /  AST  14  /  ALT  13  /  AlkPhos  81  04-07        A/P: 76yFemale with L dorsal hand collection s/p cat bite, now s/p bedside I+D  - Pain control  - WBAT LUE  - Encourage ROM of fingers and wrist  - Abx per ID  - Dressing removed and reapplied this morning, packing removed  - Wet-to-dry dressings BID per nursing care  - MRI -> no collections, edema only  - care per medicine  - No acute ortho intervention at this time  Please page back as needed if you have questions

## 2021-04-09 NOTE — DISCHARGE NOTE PROVIDER - HOSPITAL COURSE
75 yo F, h/o HLD, presenting from urgent care after her house cat bit her  on her bilateral hands and wrists on Friday, 4 days ago. She was seen initially at urgent care, 3 days ago, and started on augmentin at the time. She has been taking the antibiotic and thinks her pain and swelling have mildly improved, however she was referred to the ED when she followed up at urgent care. She reports swelling and poor ROM of her left first 3 digits, and purulent drainage from one wound on the dorsum of her left hand. O/w she denies any fevers, chills, chest pain, palpitations, sob, cough, abdominal pain, nausea, diarrhea, numbness or tingling in fingers, or any other complaints at this time.    In ED, T 98.1 HR 62 /88 RR 20 SpO2 96% RA. In ED, given 1 g ceftriaxone. Labs significant for WBC 14.52.  Abscess culture NGTD. Infectious disease consulted; pt started on unasyn and vancomycin.  Hand surgery consulted, s/p bedside incision and drainage. Left hand x-rays with diffuse soft tissue swelling, but preserved joint spaces and no join margin erosions. MR completes showed some collections concerning for abscess, no current osteo however could be too early.    77 yo F, h/o HLD, presenting from urgent care after her house cat bit her  on her bilateral hands and wrists on Friday, 4 days ago. She was seen initially at urgent care, 3 days ago, and started on augmentin at the time. She has been taking the antibiotic and thinks her pain and swelling have mildly improved, however she was referred to the ED when she followed up at urgent care. She reports swelling and poor ROM of her left first 3 digits, and purulent drainage from one wound on the dorsum of her left hand. O/w she denies any fevers, chills, chest pain, palpitations, sob, cough, abdominal pain, nausea, diarrhea, numbness or tingling in fingers, or any other complaints at this time.    In ED, T 98.1 HR 62 /88 RR 20 SpO2 96% RA. In ED, given 1 g ceftriaxone. Labs significant for WBC 14.52.  Abscess culture NGTD. Infectious disease consulted; pt started on unasyn and vancomycin.  Hand surgery consulted, s/p bedside incision and drainage. Left hand x-rays with diffuse soft tissue swelling, but preserved joint spaces and no join margin erosions. MR completes showed some collections concerning for abscess, no current osteo however could be too early. US of right hand with edema but no drainable collections. Infectious disease consulted; initially started on IV unasyn and vancomycin. Ultimately switched to and discharged on 2 g IV ceftriaxone q 24h through 5/19/2021 through PICC linewith weekly CBC w/diff and CMP. Followed by hand surgery, who performed I and D 4/6 and determined no surgical intervention based on MRI imaging. Patient developed watery diarrhea and was started on lactobacillus. Pt deemed medically stable for discharge and should follow up with PCP and Dr. Iglesias.   75 yo F, h/o HLD, presenting from urgent care after her house cat bit her  on her bilateral hands and wrists on Friday, 4 days ago. She was seen initially at urgent care, 3 days ago, and started on augmentin at the time. She has been taking the antibiotic and thinks her pain and swelling have mildly improved, however she was referred to the ED when she followed up at urgent care. She reports swelling and poor ROM of her left first 3 digits, and purulent drainage from one wound on the dorsum of her left hand. O/w she denies any fevers, chills, chest pain, palpitations, sob, cough, abdominal pain, nausea, diarrhea, numbness or tingling in fingers, or any other complaints at this time.    In ED, T 98.1 HR 62 /88 RR 20 SpO2 96% RA. In ED, given 1 g ceftriaxone. Labs significant for WBC 14.52.  Abscess culture NGTD. Infectious disease consulted; pt started on unasyn and vancomycin.  Hand surgery consulted, s/p bedside incision and drainage. Left hand x-rays with diffuse soft tissue swelling, but preserved joint spaces and no join margin erosions. MR completes showed some collections concerning for abscess, no current osteo however could be too early. US of right hand with edema but no drainable collections. Infectious disease consulted; initially started on IV unasyn and vancomycin. Ultimately switched to and discharged on 2 g IV ceftriaxone q 24h through 5/19/2021 through PICC linewith weekly CBC w/diff and CMP. Followed by hand surgery, who performed I and D 4/6 and determined no surgical intervention based on MRI imaging. Patient developed watery diarrhea which improved. Pt deemed medically stable for discharge and should follow up with PCP and Dr. Iglesias.   75 yo F, h/o HLD, presenting from urgent care after her house cat bit her  on her bilateral hands and wrists on Friday, 4 days ago. She was seen initially at urgent care, 3 days ago, and started on augmentin at the time. She has been taking the antibiotic and thinks her pain and swelling have mildly improved, however she was referred to the ED when she followed up at urgent care. She reports swelling and poor ROM of her left first 3 digits, and purulent drainage from one wound on the dorsum of her left hand. O/w she denies any fevers, chills, chest pain, palpitations, sob, cough, abdominal pain, nausea, diarrhea, numbness or tingling in fingers, or any other complaints at this time.    In ED, T 98.1 HR 62 /88 RR 20 SpO2 96% RA. In ED, given 1 g ceftriaxone. Labs significant for WBC 14.52.  Abscess culture NGTD. Infectious disease consulted; pt started on unasyn and vancomycin.  Hand surgery consulted, s/p bedside incision and drainage. Left hand x-rays with diffuse soft tissue swelling, but preserved joint spaces and no join margin erosions. MR completes showed some collections concerning for abscess, no current osteo however could be too early. US of right hand with edema but no drainable collections. Infectious disease consulted; initially started on IV unasyn and vancomycin. Ultimately switched to and discharged on 2 g IV ceftriaxone q 24h through 5/19/2021 through PICC line with weekly CBC w/diff and CMP. Followed by hand surgery, who performed I&D 4/6 and determined no surgical intervention based on MRI imaging. Patient developed watery diarrhea which improved- recommended to have probiotic yogurt. Pt deemed medically stable for discharge and should follow up with PCP and Dr. Iglesias.

## 2021-04-10 ENCOUNTER — TRANSCRIPTION ENCOUNTER (OUTPATIENT)
Age: 76
End: 2021-04-10

## 2021-04-10 LAB
ANION GAP SERPL CALC-SCNC: 12 MMOL/L — SIGNIFICANT CHANGE UP (ref 5–17)
BUN SERPL-MCNC: 15 MG/DL — SIGNIFICANT CHANGE UP (ref 7–23)
CALCIUM SERPL-MCNC: 9.4 MG/DL — SIGNIFICANT CHANGE UP (ref 8.4–10.5)
CHLORIDE SERPL-SCNC: 106 MMOL/L — SIGNIFICANT CHANGE UP (ref 96–108)
CO2 SERPL-SCNC: 24 MMOL/L — SIGNIFICANT CHANGE UP (ref 22–31)
CREAT SERPL-MCNC: 0.91 MG/DL — SIGNIFICANT CHANGE UP (ref 0.5–1.3)
GLUCOSE SERPL-MCNC: 93 MG/DL — SIGNIFICANT CHANGE UP (ref 70–99)
HCT VFR BLD CALC: 35.7 % — SIGNIFICANT CHANGE UP (ref 34.5–45)
HGB BLD-MCNC: 11.3 G/DL — LOW (ref 11.5–15.5)
MAGNESIUM SERPL-MCNC: 2 MG/DL — SIGNIFICANT CHANGE UP (ref 1.6–2.6)
MCHC RBC-ENTMCNC: 26.5 PG — LOW (ref 27–34)
MCHC RBC-ENTMCNC: 31.7 GM/DL — LOW (ref 32–36)
MCV RBC AUTO: 83.6 FL — SIGNIFICANT CHANGE UP (ref 80–100)
NRBC # BLD: 0 /100 WBCS — SIGNIFICANT CHANGE UP (ref 0–0)
PHOSPHATE SERPL-MCNC: 3.4 MG/DL — SIGNIFICANT CHANGE UP (ref 2.5–4.5)
PLATELET # BLD AUTO: 238 K/UL — SIGNIFICANT CHANGE UP (ref 150–400)
POTASSIUM SERPL-MCNC: 4 MMOL/L — SIGNIFICANT CHANGE UP (ref 3.5–5.3)
POTASSIUM SERPL-SCNC: 4 MMOL/L — SIGNIFICANT CHANGE UP (ref 3.5–5.3)
RBC # BLD: 4.27 M/UL — SIGNIFICANT CHANGE UP (ref 3.8–5.2)
RBC # FLD: 12.4 % — SIGNIFICANT CHANGE UP (ref 10.3–14.5)
SODIUM SERPL-SCNC: 142 MMOL/L — SIGNIFICANT CHANGE UP (ref 135–145)
WBC # BLD: 9.44 K/UL — SIGNIFICANT CHANGE UP (ref 3.8–10.5)
WBC # FLD AUTO: 9.44 K/UL — SIGNIFICANT CHANGE UP (ref 3.8–10.5)

## 2021-04-10 PROCEDURE — 99232 SBSQ HOSP IP/OBS MODERATE 35: CPT

## 2021-04-10 RX ORDER — CEFTRIAXONE 500 MG/1
2 INJECTION, POWDER, FOR SOLUTION INTRAMUSCULAR; INTRAVENOUS
Qty: 0 | Refills: 0 | DISCHARGE
Start: 2021-04-10 | End: 2021-05-19

## 2021-04-10 RX ORDER — LACTOBACILLUS ACIDOPHILUS 100MM CELL
1 CAPSULE ORAL
Qty: 60 | Refills: 0
Start: 2021-04-10 | End: 2021-05-09

## 2021-04-10 RX ADMIN — ATORVASTATIN CALCIUM 10 MILLIGRAM(S): 80 TABLET, FILM COATED ORAL at 21:23

## 2021-04-10 RX ADMIN — CEFTRIAXONE 100 MILLIGRAM(S): 500 INJECTION, POWDER, FOR SOLUTION INTRAMUSCULAR; INTRAVENOUS at 05:13

## 2021-04-10 RX ADMIN — ZOLPIDEM TARTRATE 5 MILLIGRAM(S): 10 TABLET ORAL at 21:23

## 2021-04-10 RX ADMIN — Medication 1 TABLET(S): at 05:13

## 2021-04-10 RX ADMIN — Medication 650 MILLIGRAM(S): at 02:28

## 2021-04-10 RX ADMIN — Medication 650 MILLIGRAM(S): at 01:58

## 2021-04-10 RX ADMIN — Medication 650 MILLIGRAM(S): at 16:00

## 2021-04-10 RX ADMIN — Medication 650 MILLIGRAM(S): at 15:10

## 2021-04-10 NOTE — DISCHARGE NOTE NURSING/CASE MANAGEMENT/SOCIAL WORK - NSDCFUADDAPPT_GEN_ALL_CORE_FT
Please follow up with Dr. Iglesias in 1 week.  Please obtain weekly CBC with differential and CMP from a lab through May 19.  ***************Please have results faxed to [phone]

## 2021-04-10 NOTE — DISCHARGE NOTE NURSING/CASE MANAGEMENT/SOCIAL WORK - NSSCNAMETXT_GEN_ALL_CORE
[facility] will be contacting you to schedule home visit. [facility] will also be contacting you to schedule home visit.

## 2021-04-10 NOTE — PROGRESS NOTE ADULT - PROBLEM SELECTOR PLAN 2
- pt complaining of multiple episodes of water, non-foul smell diarrhea since starting abx w/no leukocytosis or toxic appearance  - start lactobacillus TID  - f/u stool cultures and GI PCR - pt complaining of multiple episodes of water, non-foul smell diarrhea since starting abx w/no leukocytosis or toxic appearance  - start lactobacillus TID  - improving - pt complaining of multiple episodes of water, non-foul smell diarrhea since starting abx w/no leukocytosis or toxic appearance  - improving

## 2021-04-10 NOTE — PROGRESS NOTE ADULT - SUBJECTIVE AND OBJECTIVE BOX
Contact Information:  Imelda Do MD,  PGY-1, Internal Medicine  Pager: [phone] ([facility]) /// [phone] (CORIE)    SVEN MONK, MRN-[id number]    Patient is a 76y old  Female who presents with a chief complaint of cat bite (09 Apr 2021 10:54)      OVERNIGHT EVENTS:  Pt experiencing some itching. No acute events overnight.  SUBJECTIVE:        OBJECTIVE:  Vital Signs Last 24 Hrs  T(C): 36.4 (10 Apr 2021 05:49), Max: 36.7 (09 Apr 2021 21:00)  T(F): 97.6 (10 Apr 2021 05:49), Max: 98 (09 Apr 2021 21:00)  HR: 67 (10 Apr 2021 05:49) (67 - 73)  BP: 122/72 (10 Apr 2021 05:49) (117/74 - 129/73)  BP(mean): 18 (09 Apr 2021 21:00) (18 - 18)  RR: 18 (10 Apr 2021 05:49) (18 - 18)  SpO2: 97% (10 Apr 2021 05:49) (94% - 97%)  I&O's Summary    09 Apr 2021 07:01  -  10 Apr 2021 07:00  --------------------------------------------------------  IN: 720 mL / OUT: 0 mL / NET: 720 mL        MEDICATIONS  (STANDING):  atorvastatin 10 milliGRAM(s) Oral at bedtime  cefTRIAXone   IVPB 2000 milliGRAM(s) IV Intermittent every 24 hours  enoxaparin Injectable 40 milliGRAM(s) SubCutaneous daily  lactobacillus acidophilus 1 Tablet(s) Oral three times a day    MEDICATIONS  (PRN):  acetaminophen   Tablet .. 650 milliGRAM(s) Oral every 6 hours PRN Moderate Pain (4 - 6)  ibuprofen  Tablet. 400 milliGRAM(s) Oral every 6 hours PRN Severe Pain (7 - 10)  zolpidem 5 milliGRAM(s) Oral at bedtime PRN Insomnia  zolpidem 5 milliGRAM(s) Oral at bedtime PRN Insomnia    Allergies    No Known Allergies    Intolerances    CONSTITUTIONAL: NAD, well-developed  RESPIRATORY: Normal respiratory effort; lungs are clear to auscultation bilaterally  CARDIOVASCULAR: Regular rate and rhythm, normal S1 and S2, no murmur/rub/gallop; No lower extremity edema; Peripheral pulses are 2+ bilaterally  ABDOMEN: Nontender to palpation, normoactive bowel sounds, no rebound/guarding; No hepatosplenomegaly  MUSCLOSKELETAL: no clubbing or cyanosis of digits; no joint swelling or tenderness to palpation  EXTREMITIES: left hand swelling with erythema, tenderness to palpation but returned ROM. covered w/dressing. Right hand with erythema, no purulence.   PSYCH: A+O to person, place, and time; affect appropriate                                    11.3   9.11  )-----------( 216      ( 09 Apr 2021 07:11 )             34.6       04-09    142  |  108  |  14  ----------------------------<  95  3.9   |  22  |  0.84    Ca    9.3      09 Apr 2021 07:11  Phos  3.2     04-09  Mg     1.9     04-09    TPro  6.2  /  Alb  3.3  /  TBili  0.3  /  DBili  x   /  AST  14  /  ALT  10  /  AlkPhos  74  04-09    CAPILLARY BLOOD GLUCOSE        LIVER FUNCTIONS - ( 09 Apr 2021 07:11 )  Alb: 3.3 g/dL / Pro: 6.2 g/dL / ALK PHOS: 74 U/L / ALT: 10 U/L / AST: 14 U/L / GGT: x                       RADIOLOGY AND ADDITIONAL TESTS:    CONSULTANT NOTES REVIEWED:    CARE DISCUSSED WITH THE FOLLOWING CONSULTANTS/PROVIDERS: Contact Information:  Imelda Do MD,  PGY-1, Internal Medicine  Pager: [phone] ([facility]) /// [phone] ([place])    SVEN MONK, MRN-[id number]    Patient is a 76y old  Female who presents with a chief complaint of cat bite (09 Apr 2021 10:54)      OVERNIGHT EVENTS:  Pt experiencing some itching. No acute events overnight.  SUBJECTIVE:  Pt seen and examined at bedside. Pt reports continued watery diarrhea, however it has improved. She feels well and notes further improvement in her hand swelling. Denied cough, fevers, chills, n/v/c, SOB, palpitations, fevers, chills.       OBJECTIVE:  Vital Signs Last 24 Hrs  T(C): 36.4 (10 Apr 2021 05:49), Max: 36.7 (09 Apr 2021 21:00)  T(F): 97.6 (10 Apr 2021 05:49), Max: 98 (09 Apr 2021 21:00)  HR: 67 (10 Apr 2021 05:49) (67 - 73)  BP: 122/72 (10 Apr 2021 05:49) (117/74 - 129/73)  BP(mean): 18 (09 Apr 2021 21:00) (18 - 18)  RR: 18 (10 Apr 2021 05:49) (18 - 18)  SpO2: 97% (10 Apr 2021 05:49) (94% - 97%)  I&O's Summary    09 Apr 2021 07:01  -  10 Apr 2021 07:00  --------------------------------------------------------  IN: 720 mL / OUT: 0 mL / NET: 720 mL        MEDICATIONS  (STANDING):  atorvastatin 10 milliGRAM(s) Oral at bedtime  cefTRIAXone   IVPB 2000 milliGRAM(s) IV Intermittent every 24 hours  enoxaparin Injectable 40 milliGRAM(s) SubCutaneous daily  lactobacillus acidophilus 1 Tablet(s) Oral three times a day    MEDICATIONS  (PRN):  acetaminophen   Tablet .. 650 milliGRAM(s) Oral every 6 hours PRN Moderate Pain (4 - 6)  ibuprofen  Tablet. 400 milliGRAM(s) Oral every 6 hours PRN Severe Pain (7 - 10)  zolpidem 5 milliGRAM(s) Oral at bedtime PRN Insomnia  zolpidem 5 milliGRAM(s) Oral at bedtime PRN Insomnia    Allergies    No Known Allergies    Intolerances    CONSTITUTIONAL: NAD, well-developed  RESPIRATORY: Normal respiratory effort; lungs are clear to auscultation bilaterally  CARDIOVASCULAR: Regular rate and rhythm, normal S1 and S2, no murmur/rub/gallop; No lower extremity edema; Peripheral pulses are 2+ bilaterally  ABDOMEN: Nontender to palpation, normoactive bowel sounds, no rebound/guarding; No hepatosplenomegaly  MUSCLOSKELETAL: no clubbing or cyanosis of digits; no joint swelling or tenderness to palpation  EXTREMITIES: left hand swelling with erythema, tenderness to palpation but returned ROM. covered w/dressing. Right hand with erythema, no purulence.   PSYCH: A+O to person, place, and time; affect appropriate                                    11.3   9.11  )-----------( 216      ( 09 Apr 2021 07:11 )             34.6       04-09    142  |  108  |  14  ----------------------------<  95  3.9   |  22  |  0.84    Ca    9.3      09 Apr 2021 07:11  Phos  3.2     04-09  Mg     1.9     04-09    TPro  6.2  /  Alb  3.3  /  TBili  0.3  /  DBili  x   /  AST  14  /  ALT  10  /  AlkPhos  74  04-09    CAPILLARY BLOOD GLUCOSE        LIVER FUNCTIONS - ( 09 Apr 2021 07:11 )  Alb: 3.3 g/dL / Pro: 6.2 g/dL / ALK PHOS: 74 U/L / ALT: 10 U/L / AST: 14 U/L / GGT: x                       RADIOLOGY AND ADDITIONAL TESTS:    CONSULTANT NOTES REVIEWED:    CARE DISCUSSED WITH THE FOLLOWING CONSULTANTS/PROVIDERS: Contact Information:  Imelda Do MD,  PGY-1, Internal Medicine  Pager: [phone] ([facility]) /// [phone] ([place])    SVEN MONK, MRN-[id number]    Patient is a 76y old  Female who presents with a chief complaint of cat bite (09 Apr 2021 10:54)      OVERNIGHT EVENTS:  Pt experiencing some itching. No acute events overnight.  SUBJECTIVE:  Pt seen and examined at bedside. Pt reports continued watery diarrhea, however it has improved. She feels well and notes further improvement in her hand swelling. Denied cough, fevers, chills, n/v/c, SOB, palpitations, fevers, chills.     12 point ros negative except for above    OBJECTIVE:  Vital Signs Last 24 Hrs  T(C): 36.4 (10 Apr 2021 05:49), Max: 36.7 (09 Apr 2021 21:00)  T(F): 97.6 (10 Apr 2021 05:49), Max: 98 (09 Apr 2021 21:00)  HR: 67 (10 Apr 2021 05:49) (67 - 73)  BP: 122/72 (10 Apr 2021 05:49) (117/74 - 129/73)  BP(mean): 18 (09 Apr 2021 21:00) (18 - 18)  RR: 18 (10 Apr 2021 05:49) (18 - 18)  SpO2: 97% (10 Apr 2021 05:49) (94% - 97%)  I&O's Summary    09 Apr 2021 07:01  -  10 Apr 2021 07:00  --------------------------------------------------------  IN: 720 mL / OUT: 0 mL / NET: 720 mL        MEDICATIONS  (STANDING):  atorvastatin 10 milliGRAM(s) Oral at bedtime  cefTRIAXone   IVPB 2000 milliGRAM(s) IV Intermittent every 24 hours  enoxaparin Injectable 40 milliGRAM(s) SubCutaneous daily  lactobacillus acidophilus 1 Tablet(s) Oral three times a day    MEDICATIONS  (PRN):  acetaminophen   Tablet .. 650 milliGRAM(s) Oral every 6 hours PRN Moderate Pain (4 - 6)  ibuprofen  Tablet. 400 milliGRAM(s) Oral every 6 hours PRN Severe Pain (7 - 10)  zolpidem 5 milliGRAM(s) Oral at bedtime PRN Insomnia  zolpidem 5 milliGRAM(s) Oral at bedtime PRN Insomnia    Allergies    No Known Allergies    Intolerances    CONSTITUTIONAL: NAD, well-developed  RESPIRATORY: Normal respiratory effort; lungs are clear to auscultation bilaterally  CARDIOVASCULAR: Regular rate and rhythm, normal S1 and S2, no murmur/rub/gallop; No lower extremity edema; Peripheral pulses are 2+ bilaterally  ABDOMEN: Nontender to palpation, normoactive bowel sounds, no rebound/guarding; No hepatosplenomegaly  MUSCLOSKELETAL: no clubbing or cyanosis of digits; no joint swelling or tenderness to palpation  EXTREMITIES: left hand swelling with erythema, tenderness to palpation but returned ROM. Covered w/dressing. Deferred undressing. Right hand with erythema, no purulence.   PSYCH: A+O to person, place, and time; affect appropriate                                    11.3   9.11  )-----------( 216      ( 09 Apr 2021 07:11 )             34.6       04-09    142  |  108  |  14  ----------------------------<  95  3.9   |  22  |  0.84    Ca    9.3      09 Apr 2021 07:11  Phos  3.2     04-09  Mg     1.9     04-09    TPro  6.2  /  Alb  3.3  /  TBili  0.3  /  DBili  x   /  AST  14  /  ALT  10  /  AlkPhos  74  04-09    CAPILLARY BLOOD GLUCOSE        LIVER FUNCTIONS - ( 09 Apr 2021 07:11 )  Alb: 3.3 g/dL / Pro: 6.2 g/dL / ALK PHOS: 74 U/L / ALT: 10 U/L / AST: 14 U/L / GGT: x                       RADIOLOGY AND ADDITIONAL TESTS:    CONSULTANT NOTES REVIEWED:    CARE DISCUSSED WITH THE FOLLOWING CONSULTANTS/PROVIDERS:

## 2021-04-10 NOTE — PROGRESS NOTE ADULT - PROBLEM SELECTOR PLAN 1
- pt presenting with worsening bilateral hand swelling and erythema after a cat bite 4 days ago while on day 4/10 of augmentin prescribed from urgent care. Cellulitis worse on left hand. WBC to 14.52. S/p 1 g ceftriaxone in ED  - IV unasyn 3 g q8 and vancomycin 1 g q 24 discontinued; switched to 2 g CTX q 24 hr through 5/19/2021  - picc line ordered  - left hand x ray with preserved joint spaces and no joint margin erosions.  - ESR/CRP elevated  - abscess culture with gram stain NGTD  - hand surgery consulted; performed I and D 4/6  - MR completes showed some collections concerning for abscess, no current osteo however could be too early.  - right hand US w/edema w/o drainable collection - pt presenting with worsening bilateral hand swelling and erythema after a cat bite 4 days ago while on day 4/10 of augmentin prescribed from urgent care. Cellulitis worse on left hand. WBC to 14.52. S/p 1 g ceftriaxone in ED  - IV unasyn 3 g q8 and vancomycin 1 g q 24 discontinued; switched to 2 g CTX q 24 hr through 5/19/2021  - picc line in place  - left hand x ray with preserved joint spaces and no joint margin erosions.  - ESR/CRP elevated  - abscess culture with gram stain NGTD  - hand surgery consulted; performed I and D 4/6  - MR completes showed some collections concerning for abscess, no current osteo however could be too early.  - right hand US w/edema w/o drainable collection

## 2021-04-10 NOTE — PROGRESS NOTE ADULT - PROBLEM SELECTOR PLAN 5
Transitions of Care Status:  1.  Name of PCP:  2.  PCP Contacted on Admission: [ ] Y    [ ] N    3.  PCP contacted at Discharge: [ ] Y    [ ] N    [ ] N/A  4.  Post-Discharge Appointment Date and Location:  5.  Summary of Handoff given to PCP: as above.

## 2021-04-10 NOTE — DISCHARGE NOTE NURSING/CASE MANAGEMENT/SOCIAL WORK - PATIENT PORTAL LINK FT
You can access the FollowMyHealth Patient Portal offered by [facility] by registering at the following website: [email or web address]. By joining [facility]’s FollowMyHealth portal, you will also be able to view your health information using other applications (apps) compatible with our system.

## 2021-04-11 PROCEDURE — 99232 SBSQ HOSP IP/OBS MODERATE 35: CPT

## 2021-04-11 RX ADMIN — ENOXAPARIN SODIUM 40 MILLIGRAM(S): 100 INJECTION SUBCUTANEOUS at 12:33

## 2021-04-11 RX ADMIN — Medication 400 MILLIGRAM(S): at 02:26

## 2021-04-11 RX ADMIN — CEFTRIAXONE 100 MILLIGRAM(S): 500 INJECTION, POWDER, FOR SOLUTION INTRAMUSCULAR; INTRAVENOUS at 05:49

## 2021-04-11 RX ADMIN — ATORVASTATIN CALCIUM 10 MILLIGRAM(S): 80 TABLET, FILM COATED ORAL at 21:41

## 2021-04-11 RX ADMIN — ZOLPIDEM TARTRATE 5 MILLIGRAM(S): 10 TABLET ORAL at 21:41

## 2021-04-11 NOTE — PROGRESS NOTE ADULT - ASSESSMENT
75 yo F, h/o HLD, presenting from urgent care after her bilateral cat bite. MRI findings c/w cellulitis, osteo, and septic arthritis.

## 2021-04-11 NOTE — PROGRESS NOTE ADULT - PROBLEM SELECTOR PLAN 1
- pt presenting with worsening bilateral hand swelling and erythema after a cat bite 4 days ago while on day 4/10 of augmentin prescribed from urgent care. Cellulitis worse on left hand. WBC to 14.52. S/p 1 g ceftriaxone in ED  - IV unasyn 3 g q8 and vancomycin 1 g q 24 discontinued; switched to 2 g CTX q 24 hr through 5/19/2021  - picc line in place  - left hand x ray with preserved joint spaces and no joint margin erosions.  - ESR/CRP elevated  - abscess culture with gram stain NGTD  - hand surgery consulted; performed I and D 4/6  - MR completes showed some collections concerning for abscess, no current osteo however could be too early.  - right hand US w/edema w/o drainable collection

## 2021-04-11 NOTE — PROGRESS NOTE ADULT - PROBLEM SELECTOR PLAN 2
- pt complaining of multiple episodes of water, non-foul smell diarrhea since starting abx w/no leukocytosis or toxic appearance  - improving

## 2021-04-11 NOTE — PROGRESS NOTE ADULT - SUBJECTIVE AND OBJECTIVE BOX
Contact Information:  Imelda Do MD,  PGY-1, Internal Medicine  Pager: [phone] ([facility]) /// [phone] ([facility])    SVEN MONK, MRN-[id number]    Patient is a 76y old  Female who presents with a chief complaint of cat bite (10 Apr 2021 07:50)      OVERNIGHT EVENTS:  No acute overnight events.  SUBJECTIVE:        OBJECTIVE:  Vital Signs Last 24 Hrs  T(C): 36.4 (11 Apr 2021 06:22), Max: 36.7 (10 Apr 2021 14:08)  T(F): 97.6 (11 Apr 2021 06:22), Max: 98.1 (10 Apr 2021 14:08)  HR: 60 (11 Apr 2021 06:22) (60 - 80)  BP: 132/77 (11 Apr 2021 06:22) (112/70 - 132/77)  BP(mean): --  RR: 18 (11 Apr 2021 06:22) (18 - 18)  SpO2: 93% (11 Apr 2021 06:22) (93% - 96%)  I&O's Summary    10 Apr 2021 07:01  -  11 Apr 2021 07:00  --------------------------------------------------------  IN: 240 mL / OUT: 0 mL / NET: 240 mL        MEDICATIONS  (STANDING):  atorvastatin 10 milliGRAM(s) Oral at bedtime  cefTRIAXone   IVPB 2000 milliGRAM(s) IV Intermittent every 24 hours  enoxaparin Injectable 40 milliGRAM(s) SubCutaneous daily    MEDICATIONS  (PRN):  acetaminophen   Tablet .. 650 milliGRAM(s) Oral every 6 hours PRN Moderate Pain (4 - 6)  ibuprofen  Tablet. 400 milliGRAM(s) Oral every 6 hours PRN Severe Pain (7 - 10)  zolpidem 5 milliGRAM(s) Oral at bedtime PRN Insomnia  zolpidem 5 milliGRAM(s) Oral at bedtime PRN Insomnia    Allergies    No Known Allergies    Intolerances        CONSTITUTIONAL: NAD, well-developed  RESPIRATORY: Normal respiratory effort; lungs are clear to auscultation bilaterally  CARDIOVASCULAR: Regular rate and rhythm, normal S1 and S2, no murmur/rub/gallop; No lower extremity edema; Peripheral pulses are 2+ bilaterally  ABDOMEN: Nontender to palpation, normoactive bowel sounds, no rebound/guarding; No hepatosplenomegaly  MUSCLOSKELETAL: no clubbing or cyanosis of digits; no joint swelling or tenderness to palpation  EXTREMITIES: left hand swelling with erythema, tenderness to palpation but returned ROM. Covered w/dressing. Deferred undressing. Right hand with erythema, no purulence.   PSYCH: A+O to person, place, and time; affect appropriate                         11.3   9.44  )-----------( 238      ( 10 Apr 2021 07:42 )             35.7       04-10    142  |  106  |  15  ----------------------------<  93  4.0   |  24  |  0.91    Ca    9.4      10 Apr 2021 07:31  Phos  3.4     04-10  Mg     2.0     04-10      CAPILLARY BLOOD GLUCOSE                      RADIOLOGY AND ADDITIONAL TESTS:    CONSULTANT NOTES REVIEWED:    CARE DISCUSSED WITH THE FOLLOWING CONSULTANTS/PROVIDERS: Contact Information:  Imelda Do MD,  PGY-1, Internal Medicine  Pager: [phone] ([facility]) /// [phone] ([facility])    SVEN MONK, MRN-[id number]    Patient is a 76y old  Female who presents with a chief complaint of cat bite (10 Apr 2021 07:50)      OVERNIGHT EVENTS:  No acute overnight events.  SUBJECTIVE:  Pt seen and examined at bedside. Reports feeling and well and notes continued improvement in her hand swelling. Denied cough, fevers, chills, n/v. Her diarrhea is improving.      OBJECTIVE:  Vital Signs Last 24 Hrs  T(C): 36.4 (11 Apr 2021 06:22), Max: 36.7 (10 Apr 2021 14:08)  T(F): 97.6 (11 Apr 2021 06:22), Max: 98.1 (10 Apr 2021 14:08)  HR: 60 (11 Apr 2021 06:22) (60 - 80)  BP: 132/77 (11 Apr 2021 06:22) (112/70 - 132/77)  BP(mean): --  RR: 18 (11 Apr 2021 06:22) (18 - 18)  SpO2: 93% (11 Apr 2021 06:22) (93% - 96%)  I&O's Summary    10 Apr 2021 07:01  -  11 Apr 2021 07:00  --------------------------------------------------------  IN: 240 mL / OUT: 0 mL / NET: 240 mL        MEDICATIONS  (STANDING):  atorvastatin 10 milliGRAM(s) Oral at bedtime  cefTRIAXone   IVPB 2000 milliGRAM(s) IV Intermittent every 24 hours  enoxaparin Injectable 40 milliGRAM(s) SubCutaneous daily    MEDICATIONS  (PRN):  acetaminophen   Tablet .. 650 milliGRAM(s) Oral every 6 hours PRN Moderate Pain (4 - 6)  ibuprofen  Tablet. 400 milliGRAM(s) Oral every 6 hours PRN Severe Pain (7 - 10)  zolpidem 5 milliGRAM(s) Oral at bedtime PRN Insomnia  zolpidem 5 milliGRAM(s) Oral at bedtime PRN Insomnia    Allergies    No Known Allergies    Intolerances        CONSTITUTIONAL: NAD, well-developed  RESPIRATORY: Normal respiratory effort; lungs are clear to auscultation bilaterally  CARDIOVASCULAR: Regular rate and rhythm, normal S1 and S2, no murmur/rub/gallop; No lower extremity edema; Peripheral pulses are 2+ bilaterally  ABDOMEN: Nontender to palpation, normoactive bowel sounds, no rebound/guarding; No hepatosplenomegaly  MUSCLOSKELETAL: no clubbing or cyanosis of digits; no joint swelling or tenderness to palpation  EXTREMITIES: left hand swelling with erythema, tenderness to palpation but returned ROM. Covered w/dressing. Deferred undressing. Right hand with erythema, no purulence.   PSYCH: A+O to person, place, and time; affect appropriate                         11.3   9.44  )-----------( 238      ( 10 Apr 2021 07:42 )             35.7       04-10    142  |  106  |  15  ----------------------------<  93  4.0   |  24  |  0.91    Ca    9.4      10 Apr 2021 07:31  Phos  3.4     04-10  Mg     2.0     04-10      CAPILLARY BLOOD GLUCOSE                      RADIOLOGY AND ADDITIONAL TESTS:    CONSULTANT NOTES REVIEWED:    CARE DISCUSSED WITH THE FOLLOWING CONSULTANTS/PROVIDERS: Contact Information:  Imelda Do MD,  PGY-1, Internal Medicine  Pager: [phone] ([facility]) /// [phone] ([facility])    SVEN MONK, MRN-[id number]    Patient is a 76y old  Female who presents with a chief complaint of cat bite (10 Apr 2021 07:50)      OVERNIGHT EVENTS:  No acute overnight events.  SUBJECTIVE:  Pt seen and examined at bedside. Reports feeling and well and notes continued improvement in her hand swelling. Denied cough, fevers, chills, n/v. Her diarrhea is improving.    12 point ros negative except for above    OBJECTIVE:  Vital Signs Last 24 Hrs  T(C): 36.4 (11 Apr 2021 06:22), Max: 36.7 (10 Apr 2021 14:08)  T(F): 97.6 (11 Apr 2021 06:22), Max: 98.1 (10 Apr 2021 14:08)  HR: 60 (11 Apr 2021 06:22) (60 - 80)  BP: 132/77 (11 Apr 2021 06:22) (112/70 - 132/77)  BP(mean): --  RR: 18 (11 Apr 2021 06:22) (18 - 18)  SpO2: 93% (11 Apr 2021 06:22) (93% - 96%)  I&O's Summary    10 Apr 2021 07:01  -  11 Apr 2021 07:00  --------------------------------------------------------  IN: 240 mL / OUT: 0 mL / NET: 240 mL        MEDICATIONS  (STANDING):  atorvastatin 10 milliGRAM(s) Oral at bedtime  cefTRIAXone   IVPB 2000 milliGRAM(s) IV Intermittent every 24 hours  enoxaparin Injectable 40 milliGRAM(s) SubCutaneous daily    MEDICATIONS  (PRN):  acetaminophen   Tablet .. 650 milliGRAM(s) Oral every 6 hours PRN Moderate Pain (4 - 6)  ibuprofen  Tablet. 400 milliGRAM(s) Oral every 6 hours PRN Severe Pain (7 - 10)  zolpidem 5 milliGRAM(s) Oral at bedtime PRN Insomnia  zolpidem 5 milliGRAM(s) Oral at bedtime PRN Insomnia    Allergies    No Known Allergies    Intolerances        CONSTITUTIONAL: NAD, well-developed  RESPIRATORY: Normal respiratory effort; lungs are clear to auscultation bilaterally  CARDIOVASCULAR: Regular rate and rhythm, normal S1 and S2, no murmur/rub/gallop; No lower extremity edema; Peripheral pulses are 2+ bilaterally  ABDOMEN: Nontender to palpation, normoactive bowel sounds, no rebound/guarding; No hepatosplenomegaly  MUSCLOSKELETAL: no clubbing or cyanosis of digits; no joint swelling or tenderness to palpation  EXTREMITIES: left hand swelling with erythema, tenderness to palpation but returned ROM. Covered w/dressing. Deferred undressing. Right hand with erythema, no purulence.   PSYCH: A+O to person, place, and time; affect appropriate                         11.3   9.44  )-----------( 238      ( 10 Apr 2021 07:42 )             35.7       04-10    142  |  106  |  15  ----------------------------<  93  4.0   |  24  |  0.91    Ca    9.4      10 Apr 2021 07:31  Phos  3.4     04-10  Mg     2.0     04-10      CAPILLARY BLOOD GLUCOSE                      RADIOLOGY AND ADDITIONAL TESTS:    CONSULTANT NOTES REVIEWED:    CARE DISCUSSED WITH THE FOLLOWING CONSULTANTS/PROVIDERS:

## 2021-04-12 VITALS
OXYGEN SATURATION: 98 % | RESPIRATION RATE: 18 BRPM | DIASTOLIC BLOOD PRESSURE: 82 MMHG | TEMPERATURE: 98 F | SYSTOLIC BLOOD PRESSURE: 138 MMHG | HEART RATE: 74 BPM

## 2021-04-12 LAB
ANION GAP SERPL CALC-SCNC: 11 MMOL/L — SIGNIFICANT CHANGE UP (ref 5–17)
BUN SERPL-MCNC: 17 MG/DL — SIGNIFICANT CHANGE UP (ref 7–23)
CALCIUM SERPL-MCNC: 9.4 MG/DL — SIGNIFICANT CHANGE UP (ref 8.4–10.5)
CHLORIDE SERPL-SCNC: 106 MMOL/L — SIGNIFICANT CHANGE UP (ref 96–108)
CO2 SERPL-SCNC: 24 MMOL/L — SIGNIFICANT CHANGE UP (ref 22–31)
CREAT SERPL-MCNC: 0.92 MG/DL — SIGNIFICANT CHANGE UP (ref 0.5–1.3)
GLUCOSE SERPL-MCNC: 90 MG/DL — SIGNIFICANT CHANGE UP (ref 70–99)
HCT VFR BLD CALC: 35.8 % — SIGNIFICANT CHANGE UP (ref 34.5–45)
HGB BLD-MCNC: 11.5 G/DL — SIGNIFICANT CHANGE UP (ref 11.5–15.5)
MAGNESIUM SERPL-MCNC: 2 MG/DL — SIGNIFICANT CHANGE UP (ref 1.6–2.6)
MCHC RBC-ENTMCNC: 26.9 PG — LOW (ref 27–34)
MCHC RBC-ENTMCNC: 32.1 GM/DL — SIGNIFICANT CHANGE UP (ref 32–36)
MCV RBC AUTO: 83.8 FL — SIGNIFICANT CHANGE UP (ref 80–100)
NRBC # BLD: 0 /100 WBCS — SIGNIFICANT CHANGE UP (ref 0–0)
PHOSPHATE SERPL-MCNC: 2.8 MG/DL — SIGNIFICANT CHANGE UP (ref 2.5–4.5)
PLATELET # BLD AUTO: 250 K/UL — SIGNIFICANT CHANGE UP (ref 150–400)
POTASSIUM SERPL-MCNC: 4.1 MMOL/L — SIGNIFICANT CHANGE UP (ref 3.5–5.3)
POTASSIUM SERPL-SCNC: 4.1 MMOL/L — SIGNIFICANT CHANGE UP (ref 3.5–5.3)
RBC # BLD: 4.27 M/UL — SIGNIFICANT CHANGE UP (ref 3.8–5.2)
RBC # FLD: 12.1 % — SIGNIFICANT CHANGE UP (ref 10.3–14.5)
SODIUM SERPL-SCNC: 141 MMOL/L — SIGNIFICANT CHANGE UP (ref 135–145)
WBC # BLD: 10.29 K/UL — SIGNIFICANT CHANGE UP (ref 3.8–10.5)
WBC # FLD AUTO: 10.29 K/UL — SIGNIFICANT CHANGE UP (ref 3.8–10.5)

## 2021-04-12 PROCEDURE — U0005: CPT

## 2021-04-12 PROCEDURE — 86769 SARS-COV-2 COVID-19 ANTIBODY: CPT

## 2021-04-12 PROCEDURE — 87641 MR-STAPH DNA AMP PROBE: CPT

## 2021-04-12 PROCEDURE — 87205 SMEAR GRAM STAIN: CPT

## 2021-04-12 PROCEDURE — 99239 HOSP IP/OBS DSCHRG MGMT >30: CPT

## 2021-04-12 PROCEDURE — 80053 COMPREHEN METABOLIC PANEL: CPT

## 2021-04-12 PROCEDURE — 86140 C-REACTIVE PROTEIN: CPT

## 2021-04-12 PROCEDURE — U0003: CPT

## 2021-04-12 PROCEDURE — C1751: CPT

## 2021-04-12 PROCEDURE — 87070 CULTURE OTHR SPECIMN AEROBIC: CPT

## 2021-04-12 PROCEDURE — 83735 ASSAY OF MAGNESIUM: CPT

## 2021-04-12 PROCEDURE — 84145 PROCALCITONIN (PCT): CPT

## 2021-04-12 PROCEDURE — 71045 X-RAY EXAM CHEST 1 VIEW: CPT

## 2021-04-12 PROCEDURE — 85652 RBC SED RATE AUTOMATED: CPT

## 2021-04-12 PROCEDURE — 97530 THERAPEUTIC ACTIVITIES: CPT

## 2021-04-12 PROCEDURE — 97165 OT EVAL LOW COMPLEX 30 MIN: CPT

## 2021-04-12 PROCEDURE — 80202 ASSAY OF VANCOMYCIN: CPT

## 2021-04-12 PROCEDURE — 73110 X-RAY EXAM OF WRIST: CPT

## 2021-04-12 PROCEDURE — 96374 THER/PROPH/DIAG INJ IV PUSH: CPT

## 2021-04-12 PROCEDURE — 84100 ASSAY OF PHOSPHORUS: CPT

## 2021-04-12 PROCEDURE — 36569 INSJ PICC 5 YR+ W/O IMAGING: CPT

## 2021-04-12 PROCEDURE — 73120 X-RAY EXAM OF HAND: CPT

## 2021-04-12 PROCEDURE — 85025 COMPLETE CBC W/AUTO DIFF WBC: CPT

## 2021-04-12 PROCEDURE — 83036 HEMOGLOBIN GLYCOSYLATED A1C: CPT

## 2021-04-12 PROCEDURE — A9585: CPT

## 2021-04-12 PROCEDURE — 80061 LIPID PANEL: CPT

## 2021-04-12 PROCEDURE — 87640 STAPH A DNA AMP PROBE: CPT

## 2021-04-12 PROCEDURE — 99284 EMERGENCY DEPT VISIT MOD MDM: CPT

## 2021-04-12 PROCEDURE — 76882 US LMTD JT/FCL EVL NVASC XTR: CPT

## 2021-04-12 PROCEDURE — 73220 MRI UPPR EXTREMITY W/O&W/DYE: CPT

## 2021-04-12 PROCEDURE — 85027 COMPLETE CBC AUTOMATED: CPT

## 2021-04-12 PROCEDURE — 80048 BASIC METABOLIC PNL TOTAL CA: CPT

## 2021-04-12 RX ORDER — LACTOBACILLUS ACIDOPHILUS 100MM CELL
1 CAPSULE ORAL DAILY
Refills: 0 | Status: DISCONTINUED | OUTPATIENT
Start: 2021-04-12 | End: 2021-04-12

## 2021-04-12 RX ADMIN — Medication 1 TABLET(S): at 11:58

## 2021-04-12 RX ADMIN — Medication 400 MILLIGRAM(S): at 02:02

## 2021-04-12 RX ADMIN — CEFTRIAXONE 100 MILLIGRAM(S): 500 INJECTION, POWDER, FOR SOLUTION INTRAMUSCULAR; INTRAVENOUS at 05:06

## 2021-04-12 RX ADMIN — ENOXAPARIN SODIUM 40 MILLIGRAM(S): 100 INJECTION SUBCUTANEOUS at 11:58

## 2021-04-12 NOTE — PROGRESS NOTE ADULT - SUBJECTIVE AND OBJECTIVE BOX
Authored by Too Dewey MD ([phone]) [facility]    SUBJECTIVE / OVERNIGHT EVENTS: NAEON. This am pt is lying in bed, NAD. Denies HA, cp, SOB , abd pain. Notes mild diarrhea, improved from previous.     atorvastatin   10 milliGRAM(s) Oral (04-11-21 @ 21:41)    cefTRIAXone   IVPB   100 mL/Hr IV Intermittent (04-12-21 @ 05:06)    enoxaparin Injectable   40 milliGRAM(s) SubCutaneous (04-11-21 @ 12:33)    ibuprofen  Tablet.   400 milliGRAM(s) Oral (04-12-21 @ 02:02)    zolpidem   5 milliGRAM(s) Oral (04-11-21 @ 21:41)    MEDICATIONS  (STANDING):  atorvastatin 10 milliGRAM(s) Oral at bedtime  cefTRIAXone   IVPB 2000 milliGRAM(s) IV Intermittent every 24 hours  enoxaparin Injectable 40 milliGRAM(s) SubCutaneous daily  lactobacillus acidophilus 1 Tablet(s) Oral daily    MEDICATIONS  (PRN):  acetaminophen   Tablet .. 650 milliGRAM(s) Oral every 6 hours PRN Moderate Pain (4 - 6)  ibuprofen  Tablet. 400 milliGRAM(s) Oral every 6 hours PRN Severe Pain (7 - 10)  zolpidem 5 milliGRAM(s) Oral at bedtime PRN Insomnia  zolpidem 5 milliGRAM(s) Oral at bedtime PRN Insomnia    I&O's Summary    11 Apr 2021 07:01  -  12 Apr 2021 07:00  --------------------------------------------------------  IN: 960 mL / OUT: 3 mL / NET: 957 mL    PHYSICAL EXAM:  Vital Signs Last 24 Hrs  T(C): 36.5 (12 Apr 2021 05:09), Max: 36.8 (11 Apr 2021 12:35)  T(F): 97.7 (12 Apr 2021 05:09), Max: 98.2 (11 Apr 2021 12:35)  HR: 74 (12 Apr 2021 05:09) (73 - 76)  BP: 102/62 (12 Apr 2021 05:09) (102/62 - 122/71)  BP(mean): --  RR: 18 (12 Apr 2021 05:09) (18 - 18)  SpO2: 97% (12 Apr 2021 05:09) (96% - 97%)    GENERAL: No acute distress, well-developed  HEAD:  Atraumatic, Normocephalic  EYES: EOMI, PERRLA, conjunctiva and sclera clear  NECK: Supple, no lymphadenopathy, no JVD  CHEST/LUNG: CTAB; No wheezes, rales, or rhonchi  HEART: Regular rate and rhythm; No murmurs, rubs, or gallops  ABDOMEN: Soft, non-tender, non-distended; normal bowel sounds, no organomegaly  EXTREMITIES: +LUE wrapped, swollen, erythematous without chiara discharge, +TTP. RUE with area of erythema on dorsum of thumb, no TTP. distal pulses 2+ b/l. +LLE lesions without bleeding, discharge- no TTP  NEUROLOGY: A&O x 3, no focal deficits  SKIN: No rashes or lesions    LABS:                        11.5   10.29 )-----------( 250      ( 12 Apr 2021 07:11 )             35.8     04-12    141  |  106  |  17  ----------------------------<  90  4.1   |  24  |  0.92    Ca    9.4      12 Apr 2021 07:08  Phos  2.8     04-12  Mg     2.0     04-12

## 2021-04-12 NOTE — PROGRESS NOTE ADULT - PROBLEM SELECTOR PLAN 3
- pt with reported history of hyperlipidemia on statin  - lipid profile normal  - c/w atorvastatin 10 mg qhs

## 2021-04-12 NOTE — PROGRESS NOTE ADULT - ASSESSMENT
77 yo F, h/o HLD, presenting from urgent care after her bilateral cat bite. MRI findings c/w cellulitis, osteo, and septic arthritis.

## 2021-04-12 NOTE — PROGRESS NOTE ADULT - REASON FOR ADMISSION
cat bite

## 2021-04-12 NOTE — PROGRESS NOTE ADULT - ATTENDING COMMENTS
Patient seen and examined, case d/w house staff.  D/C planning once home infusions set up, total d/c time 45 minutes.
-F/u with ortho hand regarding MRI left hand findings. ?need for further surgical intervention.   -F/u with ID regarding abx plan.   -Right hand with some slightly increasing erythema. Likely due to response to abx at that site. C/w IV abx. -Warm compresses.
-Right hand with slightly worsened erythema now possibly due to response to abx. -MRI of left hand pending. -C/w abx pending cultures. -F/u ortho hand results.
Patient seen and examined today. I agree with the above findings, assessment, and plan with the following additions and exceptions:     Hand cellulitis, OM, and septic arthritis.  PICC line in.  CTX course per ID.   All consultant contribution to care greatly appreciated.   Anticipate d/c on 4/12 after infusion services set up.   Hold lactobacillus given association with fungemia in patients with advanced iv access.   Abx associated diarrhea improving.   Rest of plan as above    Dr. Matthias Calvo DO  Attending Physician  Division of Hospital Medicine  [facility]  Pager:  [phone]
Patient seen and examined today. I agree with the above findings, assessment, and plan with the following additions and exceptions:     Hand cellulitis, OM, and septic arthritis.  PICC line in.  CTX course per ID.   All consultant contribution to care greatly appreciated.   Anticipate d/c on 4/11 after infusion services set up.   Hold lactobacillus given association with fungemia in patients with advanced iv access.   Abx associated diarrhea improving.   Rest of plan as above    Dr. Matthias Calvo DO  Attending Physician  Division of Hospital Medicine  [facility]  Pager:  [phone]
-PICC line for long term IV abx per ID recs. -Discussed with ID.   -F/u ortho recs.   -Left hand swelling and movement improving. -Right hand US without any drainable collection.   -Diarrhea likely abx associated. -Lactobacillus TID added. -F/u stool studies. -If leukocytosis, fever, abd pain, or persistent worsening diarrhea; send C. diff.   -DCP tentatively for this weekend.

## 2021-04-12 NOTE — PROGRESS NOTE ADULT - NSICDXPILOT_GEN_ALL_CORE
[place]

## 2021-04-12 NOTE — PROGRESS NOTE ADULT - PROVIDER SPECIALTY LIST ADULT
Infectious Disease
Orthopedics
Infectious Disease
Orthopedics
Orthopedics
Infectious Disease
Internal Medicine

## 2021-04-12 NOTE — PROGRESS NOTE ADULT - PROBLEM SELECTOR PLAN 1
Pt p/w worsening bilateral hand swelling and erythema after a cat bite 4 days ago while on day 4/10 of augmentin prescribed from urgent care. Cellulitis worse on left hand. WBC to 14.52. S/p 1 g ceftriaxone in ED  - IV unasyn 3 g q8 and vancomycin 1 g q 24 discontinued; switched to 2 g CTX q 24 hr through 5/19/2021  - picc line in place  - left hand x ray 4/6 with preserved joint spaces and no joint margin erosions.  - ESR 63/CRP 92 (elevated)  - abscess culture (4/6, 4/7) with gram stain NGTD  - hand surgery consulted; performed I and D 4/6  - MR completes showed some collections concerning for abscess, no current osteo however could be too early.  - right hand US w/edema w/o drainable collection

## 2021-04-12 NOTE — PROGRESS NOTE ADULT - PROBLEM SELECTOR PLAN 2
- pt complaining of multiple episodes of water, non-foul smell diarrhea since starting abx w/no leukocytosis or toxic appearance  - improving  - continue to monitor - pt complaining of multiple episodes of water, non-foul smell diarrhea since starting abx w/no leukocytosis or toxic appearance  - improving  - continue to monitor  - clarify with ID about lactobacillus use with PICC line (association with fungemia?)

## 2021-04-13 ENCOUNTER — NON-APPOINTMENT (OUTPATIENT)
Age: 76
End: 2021-04-13

## 2021-04-13 PROBLEM — E78.5 HYPERLIPIDEMIA, UNSPECIFIED: Chronic | Status: ACTIVE | Noted: 2021-04-06

## 2021-04-20 LAB
CULTURE RESULTS: SIGNIFICANT CHANGE UP
CULTURE RESULTS: SIGNIFICANT CHANGE UP
SPECIMEN SOURCE: SIGNIFICANT CHANGE UP
SPECIMEN SOURCE: SIGNIFICANT CHANGE UP

## 2021-04-22 ENCOUNTER — APPOINTMENT (OUTPATIENT)
Dept: INTERNAL MEDICINE | Facility: CLINIC | Age: 76
End: 2021-04-22
Payer: MEDICARE

## 2021-04-22 VITALS
TEMPERATURE: 96.5 F | WEIGHT: 153 LBS | SYSTOLIC BLOOD PRESSURE: 128 MMHG | OXYGEN SATURATION: 98 % | HEIGHT: 64 IN | BODY MASS INDEX: 26.12 KG/M2 | HEART RATE: 70 BPM | DIASTOLIC BLOOD PRESSURE: 70 MMHG | RESPIRATION RATE: 16 BRPM

## 2021-04-22 DIAGNOSIS — M86.242: ICD-10-CM

## 2021-04-22 PROCEDURE — 99495 TRANSJ CARE MGMT MOD F2F 14D: CPT

## 2021-04-22 PROCEDURE — 99072 ADDL SUPL MATRL&STAF TM PHE: CPT

## 2021-04-22 NOTE — ASSESSMENT
[FreeTextEntry1] : The cellulitis of the right hand has resolved.Cellulitis and osteomyelitis of the left hand has improved, but not yet resolved.  ID followup in 4 days is scheduled. Same Rx for now. She is taking a probiotic and will continue it..

## 2021-04-22 NOTE — PLAN
[FreeTextEntry1] : infectious disease followup.I will followup p.r.n. antibiotics we'll continue the PICC line.

## 2021-04-22 NOTE — HEALTH RISK ASSESSMENT
[No] : In the past 12 months have you used drugs other than those required for medical reasons? No [No falls in past year] : Patient reported no falls in the past year [0] : 2) Feeling down, depressed, or hopeless: Not at all (0) [Patient reported mammogram was normal] : Patient reported mammogram was normal [Patient reported bone density results were abnormal] : Patient reported bone density results were abnormal [HIV test declined] : HIV test declined [Hepatitis C test declined] : Hepatitis C test declined [None] : None [Alone] : lives alone [Unemployed] : unemployed [] :  [Fully functional (bathing, dressing, toileting, transferring, walking, feeding)] : Fully functional (bathing, dressing, toileting, transferring, walking, feeding) [Fully functional (using the telephone, shopping, preparing meals, housekeeping, doing laundry, using] : Fully functional and needs no help or supervision to perform IADLs (using the telephone, shopping, preparing meals, housekeeping, doing laundry, using transportation, managing medications and managing finances) [Reports normal functional visual acuity (ie: able to read med bottle)] : Reports normal functional visual acuity [Smoke Detector] : smoke detector [Carbon Monoxide Detector] : carbon monoxide detector [Safety elements used in home] : safety elements used in home [Seat Belt] :  uses seat belt [Sunscreen] : uses sunscreen [Patient/Caregiver not ready to engage] : Patient/Caregiver not ready to engage [] : No [de-identified] : Infectious disease [de-identified] : Walks for exercise [de-identified] : , Healthy [[id number]] : 0 [Change in mental status noted] : No change in mental status noted [Language] : denies difficulty with language [Behavior] : denies difficulty with behavior [Learning/Retaining New Information] : denies difficulty learning/retaining new information [Handling Complex Tasks] : denies difficulty handling complex tasks [Reasoning] : denies difficulty with reasoning [Spatial Ability and Orientation] : denies difficulty with spatial ability and orientation [Sexually Active] : not sexually active [High Risk Behavior] : no high risk behavior [Reports changes in hearing] : Reports no changes in hearing [Reports changes in vision] : Reports no changes in vision [Reports changes in dental health] : Reports no changes in dental health [Guns at Home] : no guns at home [Travel to Developing Areas] : does not  travel to developing areas [TB Exposure] : is not being exposed to tuberculosis [Caregiver Concerns] : does not have caregiver concerns [MammogramDate] : 12/17 [BoneDensityDate] : 4/15 [BoneDensityComments] : Osteopenia [AdvancecareDate] : 4/2021

## 2021-04-22 NOTE — PHYSICAL EXAM
[Normal] : affect was normal and insight and judgment were intact [de-identified] : The dorsum of the left handis erythematous and slightly tender there is a small vesicular area. The dorsum of the right hand is clean and the wounds are dry and resolving.

## 2021-04-22 NOTE — HISTORY OF PRESENT ILLNESS
[Post-hospitalization from ___ Hospital] : Post-hospitalization from [unfilled] Hospital [Admitted on: ___] : The patient was admitted on [unfilled] [Discharged on ___] : discharged on [unfilled] [Discharge Summary] : discharge summary [Discharge Med List] : discharge medication list [Patient Contacted By: ____] : and contacted by [unfilled] [FreeTextEntry2] : 3 weeks ago the patient was caught in an altercation between her cat and a puppy that she was babysitting for.  the cat got startled and bit her right hand and scratched the dorsum of her left hand.  Over the next several days redness swelling and pain developed. She was referred to the emergency room and admitted. Initial evaluation revealed bilateral cellulitis.Subsequent studies confirmed the presence of osteomyelitis in the left hand.  She was initially on a cephalosporin. PICC Line was inserted and antibiotics were changed to Unasyn and vancomycin. She has had gradual improvement. a bulus area On the dorsum of the right hand was lanced with good results.  The left hand has improved but remains swollen, erythematous and has a smaller vesicular area.

## 2021-04-26 ENCOUNTER — APPOINTMENT (OUTPATIENT)
Dept: INFECTIOUS DISEASE | Facility: CLINIC | Age: 76
End: 2021-04-26
Payer: MEDICARE

## 2021-04-26 VITALS
TEMPERATURE: 97.8 F | BODY MASS INDEX: 25.78 KG/M2 | WEIGHT: 151 LBS | HEIGHT: 64 IN | OXYGEN SATURATION: 98 % | HEART RATE: 74 BPM | DIASTOLIC BLOOD PRESSURE: 65 MMHG | SYSTOLIC BLOOD PRESSURE: 127 MMHG
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
## 2021-04-26 DIAGNOSIS — A28.0 PASTEURELLOSIS: ICD-10-CM

## 2021-04-26 PROCEDURE — 99072 ADDL SUPL MATRL&STAF TM PHE: CPT

## 2021-04-26 PROCEDURE — 99213 OFFICE O/P EST LOW 20 MIN: CPT

## 2021-04-26 NOTE — PHYSICAL EXAM
[General Appearance - Alert] : alert [General Appearance - In No Acute Distress] : in no acute distress [Sclera] : the sclera and conjunctiva were normal [Outer Ear] : the ears and nose were normal in appearance [Neck Appearance] : the appearance of the neck was normal [Auscultation Breath Sounds / Voice Sounds] : lungs were clear to auscultation bilaterally [Heart Rate And Rhythm] : heart rate was normal and rhythm regular [Heart Sounds] : normal S1 and S2 [Heart Sounds Gallop] : no gallops [Murmurs] : no murmurs [Heart Sounds Pericardial Friction Rub] : no pericardial rub [Edema] : there was no peripheral edema [Bowel Sounds] : normal bowel sounds [Abdomen Soft] : soft [Abdomen Tenderness] : non-tender [] : no hepato-splenomegaly [Abdomen Mass (___ Cm)] : no abdominal mass palpated [No Palpable Adenopathy] : no palpable adenopathy [FreeTextEntry1] : LUE with some induration over 3rd and 4th MCP digits. Some residual mild erythema noted. No tenderness to palpation. Some mild limitation in ROM.  [Skin Color & Pigmentation] : normal skin color and pigmentation [No Focal Deficits] : no focal deficits [Affect] : the affect was normal

## 2021-04-26 NOTE — HISTORY OF PRESENT ILLNESS
[FreeTextEntry1] : 75 yo F with HLD who presents for followup appointment. Patient with admission to [facility] on 4/6/21 - 4/13/21 following a cat bite to LUE and RUE hands. Patient underwent debridement of LUE wounds on 4/6 by hand surgery with deeper cultures obtained (which are with NGTD). Patient underwent MRI LUE Hand with Moderate synovitis of the second and third metacarpal phalangeal joints with faint marrow edema and marrow enhancement (?Osteomyelitis) and several apparent abscesses imaged in the hand. Patient was treated with Unasyn while admitted and discharged on Ceftriaxone.\par \par Since discharge patient was afebrile. Notes improvement in ROM at the MCP digits of the LUE but with some continued swelling over digits 3 and 4. Denies drainage. Resolution of swelling and pain in the RUE.

## 2021-04-26 NOTE — ASSESSMENT
[FreeTextEntry1] : 77 yo F with HLD who presents for followup appointment. \par \par Patient with admission to [facility] on 4/6/21 - 4/13/21 following a cat bite to LUE and RUE hands. \par \par Patient underwent debridement of LUE wounds on 4/6 by hand surgery\par Surgical  cultures obtained are with NGTD at 14 days\par \par MRI LUE Hand with Moderate synovitis of the second and third metacarpal phalangeal joints with faint marrow edema and marrow enhancement (?Osteomyelitis) and several apparent abscesses imaged in the hand. \par \par Patient was treated with Unasyn while admitted and discharged on Ceftriaxone with tentative end date of 5/19/21\par \par While I believe that her exam is improving I am concerned with the residual induration and erythema over her MCP\par I believe patient should have followup evaluation with Hand Surgery (Dr Maria De Jesus Felix) \par I will continue Ceftriaxone 2g IV Q24H at this time and add on ESR/CRP weekly to trend\par \par Followup: 2 weeks

## 2021-04-29 ENCOUNTER — APPOINTMENT (OUTPATIENT)
Dept: ORTHOPEDIC SURGERY | Facility: CLINIC | Age: 76
End: 2021-04-29
Payer: MEDICARE

## 2021-04-29 VITALS
DIASTOLIC BLOOD PRESSURE: 70 MMHG | HEIGHT: 64 IN | SYSTOLIC BLOOD PRESSURE: 130 MMHG | BODY MASS INDEX: 25.61 KG/M2 | WEIGHT: 150 LBS | HEART RATE: 75 BPM

## 2021-04-29 DIAGNOSIS — S61.452D OPEN BITE OF LEFT HAND, SUBSEQUENT ENCOUNTER: ICD-10-CM
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
## 2021-04-29 PROCEDURE — 99072 ADDL SUPL MATRL&STAF TM PHE: CPT

## 2021-04-29 PROCEDURE — 99212 OFFICE O/P EST SF 10 MIN: CPT

## 2021-05-10 ENCOUNTER — APPOINTMENT (OUTPATIENT)
Dept: INFECTIOUS DISEASE | Facility: CLINIC | Age: 76
End: 2021-05-10
Payer: MEDICARE

## 2021-05-10 VITALS
BODY MASS INDEX: 20.45 KG/M2 | TEMPERATURE: 98.5 F | DIASTOLIC BLOOD PRESSURE: 84 MMHG | SYSTOLIC BLOOD PRESSURE: 139 MMHG | HEART RATE: 61 BPM | HEIGHT: 72 IN | OXYGEN SATURATION: 100 % | WEIGHT: 151 LBS

## 2021-05-10 PROCEDURE — 99072 ADDL SUPL MATRL&STAF TM PHE: CPT

## 2021-05-10 PROCEDURE — 99213 OFFICE O/P EST LOW 20 MIN: CPT

## 2021-05-11 NOTE — HISTORY OF PRESENT ILLNESS
[FreeTextEntry1] : 77 yo F with HLD who presents for followup appointment. Patient with admission to [facility] on 4/6/21 - 4/13/21 following a cat bite to LUE and RUE hands. Patient underwent debridement of LUE wounds on 4/6 by hand surgery with deeper cultures obtained (which are with NGTD). Patient underwent MRI LUE Hand with Moderate synovitis of the second and third metacarpal phalangeal joints with faint marrow edema and marrow enhancement (?Osteomyelitis) and several apparent abscesses imaged in the hand. Patient was treated with Unasyn while admitted and discharged on Ceftriaxone.\par \par Since discharge patient was afebrile. Notes improvement in ROM at the MCP digits of the LUE but with some continued swelling over digits 3 and 4. Denies drainage. Resolution of swelling and pain in the RUE. \par \par Continued improvement in LUE finger ROM. Afebrile. no N/V/D

## 2021-05-11 NOTE — ASSESSMENT
[FreeTextEntry1] : 77 yo F with HLD who presents for followup appointment. \par \par Patient with admission to [facility] on 4/6/21 - 4/13/21 following a cat bite to LUE and RUE hands. \par \par Patient underwent debridement of LUE wounds on 4/6 by hand surgery\par Surgical  cultures obtained are with NGTD at 14 days\par \par MRI LUE Hand with Moderate synovitis of the second and third metacarpal phalangeal joints with faint marrow edema and marrow enhancement (?Osteomyelitis) and several apparent abscesses imaged in the hand. \par \par Patient was treated with Unasyn while admitted and discharged on Ceftriaxone with tentative end date of 5/19/21\par \par While I believe that her exam is improving I am concerned with the residual induration over her MCP\par ESR/CRP have normalized however - making uncontrolled septic arthritis less likely \par Low concern for worsening infection per ortho hand\par \par Followup: 3 weeks

## 2021-06-01 ENCOUNTER — APPOINTMENT (OUTPATIENT)
Dept: INFECTIOUS DISEASE | Facility: CLINIC | Age: 76
End: 2021-06-01
Payer: MEDICARE

## 2021-06-01 VITALS
HEART RATE: 69 BPM | HEIGHT: 64 IN | WEIGHT: 151 LBS | TEMPERATURE: 99.2 F | BODY MASS INDEX: 25.78 KG/M2 | OXYGEN SATURATION: 99 % | DIASTOLIC BLOOD PRESSURE: 60 MMHG | SYSTOLIC BLOOD PRESSURE: 112 MMHG

## 2021-06-01 PROCEDURE — 99072 ADDL SUPL MATRL&STAF TM PHE: CPT

## 2021-06-01 PROCEDURE — 99212 OFFICE O/P EST SF 10 MIN: CPT

## 2021-06-03 NOTE — HISTORY OF PRESENT ILLNESS
[FreeTextEntry1] : 77 yo F with HLD who presents for followup appointment. Patient with admission to [facility] on 4/6/21 - 4/13/21 following a cat bite to LUE and RUE hands. Patient underwent debridement of LUE wounds on 4/6 by hand surgery with deeper cultures obtained (which are with NGTD). Patient underwent MRI LUE Hand with Moderate synovitis of the second and third metacarpal phalangeal joints with faint marrow edema and marrow enhancement (?Osteomyelitis) and several apparent abscesses imaged in the hand. Patient was treated with Unasyn while admitted and discharged on Ceftriaxone.\par \par Since discharge patient was afebrile. Notes improvement in ROM at the MCP digits of the LUE but with some continued swelling over digits 3 and 4. Denies drainage. Resolution of swelling and pain in the RUE. \par \par Continued improvement in LUE finger ROM. Afebrile. no N/V/D

## 2021-06-03 NOTE — ASSESSMENT
[FreeTextEntry1] : 75 yo F with HLD who presents for followup appointment. \par \par Patient with admission to [facility] on 4/6/21 - 4/13/21 following a cat bite to LUE and RUE hands. \par \par Patient underwent debridement of LUE wounds on 4/6 by hand surgery\par Surgical  cultures obtained are with NGTD at 14 days\par \par MRI LUE Hand with Moderate synovitis of the second and third metacarpal phalangeal joints with faint marrow edema and marrow enhancement (?Osteomyelitis) and several apparent abscesses imaged in the hand. \par \par Patient was treated with Unasyn while admitted and discharged on Ceftriaxone with tentative end date of 5/19/21\par \par Still with some residual edema but improved from prior and inflammatory markers are within normal limits. Doubt residual infectious component at this point. \par \par Followup:PRN

## 2021-06-10 ENCOUNTER — NON-APPOINTMENT (OUTPATIENT)
Age: 76
End: 2021-06-10

## 2021-06-10 NOTE — DISCUSSION/SUMMARY
[Home] : patient was discharged to home [FreeTextEntry1] : Spoke w/pt f/u s/p hospitalization, pt reported feeling well, declined f/u visit at this time.will call back to schedule visit appt if needed.

## 2021-08-24 ENCOUNTER — APPOINTMENT (OUTPATIENT)
Dept: GASTROENTEROLOGY | Facility: CLINIC | Age: 76
End: 2021-08-24

## 2021-12-13 ENCOUNTER — NON-APPOINTMENT (OUTPATIENT)
Age: 76
End: 2021-12-13

## 2021-12-13 ENCOUNTER — APPOINTMENT (OUTPATIENT)
Dept: INTERNAL MEDICINE | Facility: CLINIC | Age: 76
End: 2021-12-13
Payer: MEDICARE

## 2021-12-13 VITALS
TEMPERATURE: 97.9 F | OXYGEN SATURATION: 97 % | WEIGHT: 148 LBS | BODY MASS INDEX: 25.27 KG/M2 | SYSTOLIC BLOOD PRESSURE: 110 MMHG | DIASTOLIC BLOOD PRESSURE: 70 MMHG | HEART RATE: 68 BPM | HEIGHT: 64 IN

## 2021-12-13 DIAGNOSIS — M40.209 UNSPECIFIED KYPHOSIS, SITE UNSPECIFIED: ICD-10-CM

## 2021-12-13 DIAGNOSIS — R06.02 SHORTNESS OF BREATH: ICD-10-CM

## 2021-12-13 PROCEDURE — 36415 COLL VENOUS BLD VENIPUNCTURE: CPT

## 2021-12-13 PROCEDURE — 99214 OFFICE O/P EST MOD 30 MIN: CPT | Mod: 25

## 2021-12-13 PROCEDURE — 93000 ELECTROCARDIOGRAM COMPLETE: CPT

## 2021-12-13 PROCEDURE — G0439: CPT

## 2021-12-13 NOTE — DATA REVIEWED
[FreeTextEntry1] : Electrocardiogram reveals a sinus rhythm with low voltage and a rightward P axis.–No significant change.\par \par Blood work results and urinalysis are pending

## 2021-12-13 NOTE — ASSESSMENT
[FreeTextEntry1] : The patient has received 2 Covid vaccinations as well as a booster.  She has not received influenza booster and refuses.  She has never had it and does not intend to start now.  She is warned.

## 2021-12-13 NOTE — HISTORY OF PRESENT ILLNESS
[FreeTextEntry1] : Comprehensive annual examination [de-identified] : Feels well.  Major issue during the year was not hand subsequent to a cat bite.  That has now resolved.  She has occasional fatigue.  Orthopedic problems including her hips and scoliosis,.  Short of breath only when climbing 1 flight of stairs no chest pains.

## 2021-12-13 NOTE — REVIEW OF SYSTEMS
[Patient Intake Form Reviewed] : Patient intake form was reviewed [Fatigue] : fatigue [Vision Problems] : vision problems [Palpitations] : palpitations [Joint Pain] : joint pain [Joint Stiffness] : joint stiffness [Negative] : Psychiatric [Fever] : no fever [Chills] : no chills [Hot Flashes] : no hot flashes [Night Sweats] : no night sweats [Recent Change In Weight] : ~T no recent weight change [Chest Pain] : no chest pain [Leg Claudication] : no leg claudication [Lower Ext Edema] : no lower extremity edema [Orthopnea] : no orthopnea [FreeTextEntry2] : Infrequent fatigue [FreeTextEntry3] : Mild glare issue at night [FreeTextEntry5] : Only with stress [FreeTextEntry9] : Knees are chronic problem, see hand issue and present illness

## 2021-12-13 NOTE — HEALTH RISK ASSESSMENT
[Very Good] : ~his/her~  mood as very good [Never] : Never [Never (0 pts)] : Never (0 points) [No] : In the past 12 months have you used drugs other than those required for medical reasons? No [No falls in past year] : Patient reported no falls in the past year [0] : 2) Feeling down, depressed, or hopeless: Not at all (0) [Patient reported bone density results were normal] : Patient reported bone density results were normal [Patient declined colonoscopy] : Patient declined colonoscopy [HIV test declined] : HIV test declined [Hepatitis C test declined] : Hepatitis C test declined [None] : None [Alone] : lives alone [Unemployed] : unemployed [] :  [Fully functional (bathing, dressing, toileting, transferring, walking, feeding)] : Fully functional (bathing, dressing, toileting, transferring, walking, feeding) [Fully functional (using the telephone, shopping, preparing meals, housekeeping, doing laundry, using] : Fully functional and needs no help or supervision to perform IADLs (using the telephone, shopping, preparing meals, housekeeping, doing laundry, using transportation, managing medications and managing finances) [Reports normal functional visual acuity (ie: able to read med bottle)] : Reports normal functional visual acuity [Smoke Detector] : smoke detector [Carbon Monoxide Detector] : carbon monoxide detector [Safety elements used in home] : safety elements used in home [Seat Belt] :  uses seat belt [Sunscreen] : uses sunscreen [FreeTextEntry1] : General health [de-identified] : Infectious disease [de-identified] : Sedentary [de-identified] : Healthy, inconsistent [[id number]] : 0 [Change in mental status noted] : No change in mental status noted [Language] : denies difficulty with language [Handling Complex Tasks] : denies difficulty handling complex tasks [Sexually Active] : not sexually active [High Risk Behavior] : no high risk behavior [Reports changes in hearing] : Reports no changes in hearing [Reports changes in vision] : Reports no changes in vision [Reports changes in dental health] : Reports no changes in dental health [Guns at Home] : no guns at home [Travel to Developing Areas] : does not  travel to developing areas [TB Exposure] : is not being exposed to tuberculosis [Caregiver Concerns] : does not have caregiver concerns [MammogramDate] : 12/2017 [BoneDensityDate] : 4/2015 [ColonoscopyDate] : 12/2020 [AdvancecareDate] : 11/2021

## 2021-12-14 LAB
25(OH)D3 SERPL-MCNC: 32 NG/ML
ALBUMIN SERPL ELPH-MCNC: 4.4 G/DL
ALP BLD-CCNC: 81 U/L
ALT SERPL-CCNC: 13 U/L
ANION GAP SERPL CALC-SCNC: 13 MMOL/L
APPEARANCE: CLEAR
AST SERPL-CCNC: 19 U/L
BASOPHILS # BLD AUTO: 0.03 K/UL
BASOPHILS NFR BLD AUTO: 0.3 %
BILIRUB SERPL-MCNC: 0.5 MG/DL
BILIRUBIN URINE: NEGATIVE
BLOOD URINE: NORMAL
BUN SERPL-MCNC: 13 MG/DL
CALCIUM SERPL-MCNC: 10.2 MG/DL
CHLORIDE SERPL-SCNC: 108 MMOL/L
CHOLEST SERPL-MCNC: 175 MG/DL
CO2 SERPL-SCNC: 23 MMOL/L
COLOR: YELLOW
CREAT SERPL-MCNC: 0.98 MG/DL
EOSINOPHIL # BLD AUTO: 0.1 K/UL
EOSINOPHIL NFR BLD AUTO: 1.1 %
ESTIMATED AVERAGE GLUCOSE: 114 MG/DL
GLUCOSE QUALITATIVE U: NEGATIVE
GLUCOSE SERPL-MCNC: 108 MG/DL
HBA1C MFR BLD HPLC: 5.6 %
HCT VFR BLD CALC: 42 %
HDLC SERPL-MCNC: 69 MG/DL
HGB BLD-MCNC: 13.4 G/DL
IMM GRANULOCYTES NFR BLD AUTO: 0.4 %
KETONES URINE: NEGATIVE
LDLC SERPL CALC-MCNC: 74 MG/DL
LEUKOCYTE ESTERASE URINE: NEGATIVE
LYMPHOCYTES # BLD AUTO: 2.8 K/UL
LYMPHOCYTES NFR BLD AUTO: 31.5 %
MAN DIFF?: NORMAL
MCHC RBC-ENTMCNC: 27.2 PG
MCHC RBC-ENTMCNC: 31.9 GM/DL
MCV RBC AUTO: 85.2 FL
MONOCYTES # BLD AUTO: 0.52 K/UL
MONOCYTES NFR BLD AUTO: 5.8 %
NEUTROPHILS # BLD AUTO: 5.41 K/UL
NEUTROPHILS NFR BLD AUTO: 60.9 %
NITRITE URINE: NEGATIVE
NONHDLC SERPL-MCNC: 106 MG/DL
PH URINE: 5.5
PLATELET # BLD AUTO: 211 K/UL
POTASSIUM SERPL-SCNC: 4.5 MMOL/L
PROT SERPL-MCNC: 7.3 G/DL
PROTEIN URINE: NORMAL
RBC # BLD: 4.93 M/UL
RBC # FLD: 12.6 %
SODIUM SERPL-SCNC: 144 MMOL/L
SPECIFIC GRAVITY URINE: 1.03
T3 SERPL-MCNC: 120 NG/DL
T4 SERPL-MCNC: 8.6 UG/DL
TRIGL SERPL-MCNC: 162 MG/DL
TSH SERPL-ACNC: 3.22 UIU/ML
UROBILINOGEN URINE: NORMAL
WBC # FLD AUTO: 8.9 K/UL

## 2022-05-16 ENCOUNTER — APPOINTMENT (OUTPATIENT)
Dept: OTOLARYNGOLOGY | Facility: CLINIC | Age: 77
End: 2022-05-16
Payer: MEDICARE

## 2022-05-16 VITALS
WEIGHT: 160 LBS | TEMPERATURE: 97.6 F | BODY MASS INDEX: 27.31 KG/M2 | DIASTOLIC BLOOD PRESSURE: 74 MMHG | HEART RATE: 61 BPM | HEIGHT: 64 IN | SYSTOLIC BLOOD PRESSURE: 142 MMHG

## 2022-05-16 PROCEDURE — 99204 OFFICE O/P NEW MOD 45 MIN: CPT | Mod: 25

## 2022-05-16 PROCEDURE — 92567 TYMPANOMETRY: CPT

## 2022-05-16 PROCEDURE — 92557 COMPREHENSIVE HEARING TEST: CPT

## 2022-05-16 NOTE — HISTORY OF PRESENT ILLNESS
[de-identified] : patient has been having issues with tinnitus bilaterally over the last few months. She has not had any dizziness, vertigo or changes in hearing. SHe has not seen anyone for this issue yet nor tried any treatments either over the counter or prescribed \par The noise is always there but she is able to ignore it when she is busy\par She does not have any nasal congestion issues or runny nose

## 2022-05-16 NOTE — CONSULT LETTER
[Dear  ___] : Dear  [unfilled], [Consult Letter:] : I had the pleasure of evaluating your patient, [unfilled]. [Please see my note below.] : Please see my note below. [Consult Closing:] : Thank you very much for allowing me to participate in the care of this patient.  If you have any questions, please do not hesitate to contact me. [Sincerely,] : Sincerely, [FreeTextEntry3] : Jefferson Keyes MD\par [facility]\par Otolaryngology and Facial Plastics\par [employer], [employer]\par

## 2022-05-16 NOTE — END OF VISIT
[FreeTextEntry3] : I saw and examined this patient in person. I have discussed with Kaitlin Murphy, Physician Assistant, in detail the above note and agree with the above assessment and plan of care.\par

## 2022-05-16 NOTE — ASSESSMENT
[FreeTextEntry1] : Patient with tinnitus now for several months here for evaluation on examination there is no wax audiogram was performed showed normal presbycusis symmetrical sensorineural hearing loss no further acute interventions indicated she will follow-up with us if any other symptoms were to arise or if her hearing were to deteriorate otherwise I recommend she follow-up annually.

## 2022-07-25 ENCOUNTER — APPOINTMENT (OUTPATIENT)
Dept: ORTHOPEDIC SURGERY | Facility: CLINIC | Age: 77
End: 2022-07-25

## 2022-07-27 ENCOUNTER — APPOINTMENT (OUTPATIENT)
Dept: ORTHOPEDIC SURGERY | Facility: CLINIC | Age: 77
End: 2022-07-27

## 2022-07-27 VITALS — HEIGHT: 64 IN | BODY MASS INDEX: 26.63 KG/M2 | WEIGHT: 156 LBS

## 2022-07-27 PROCEDURE — 99214 OFFICE O/P EST MOD 30 MIN: CPT

## 2022-07-27 PROCEDURE — 72070 X-RAY EXAM THORAC SPINE 2VWS: CPT

## 2022-07-27 NOTE — DISCUSSION/SUMMARY
[de-identified] : We discussed further treatment options.  Symptoms do wake her up at night.  She appears to have more point tenderness in her mid thoracic region.  She does have a red flag symptom with her night pain.  I have recommended a thoracic MRI.  Follow-up afterwards

## 2022-07-27 NOTE — HISTORY OF PRESENT ILLNESS
[de-identified] : Ms. SVEN MONK  is a 77 year old female who presents with 1 year of interscapula pain that is not improving.  Denies any UE radicular symptoms or any pain/numbness/tingling wrapping around his chest.  Her pain wakes her up at night despite taking a sleeping pill.  She takes gabapentin with minimal relief. Normal bowel and bladder control.   Denies any recent fevers, chills, sweats, weight loss, or infection.\par \par The patients past medical history, past surgical history, medications, allergies, and social history were reviewed by me today with the patient and documented accordingly.  In addition, the patient's family history, which is noncontributory to their visit, was also reviewed.\par

## 2022-07-27 NOTE — PHYSICAL EXAM
[de-identified] : Examination of the cervical and thoracic spine reveals no midline or paraspinal tenderness to palpation. No cervical lymphadenopathy. Decreased range of motion with respect to flexion, extension, rotation, and lateral bending. Negative Spurlings. Negative Lhermitte's. Full range of motion bilateral shoulders without evidence of impingement. No instability of bilateral upper extremities.  Cranial nerves II through XII grossly intact. Intact sensation bilateral upper extremities. 5/5 deltoids biceps triceps wrist extensors wrist flexors finger flexors and hand intrinsics. 1+ biceps triceps and brachioradialis reflexes. Negative Olivas's. 2+ radial pulse. Negative Tinel's over the cubital and carpal tunnel. No skin lesions on the right and left upper extremities. [de-identified] : AP lateral thoracic x-rays reveal some spondylosis without gross instability or aggressive lesions

## 2022-08-10 ENCOUNTER — APPOINTMENT (OUTPATIENT)
Dept: GASTROENTEROLOGY | Facility: CLINIC | Age: 77
End: 2022-08-10

## 2022-08-10 VITALS
DIASTOLIC BLOOD PRESSURE: 76 MMHG | SYSTOLIC BLOOD PRESSURE: 138 MMHG | OXYGEN SATURATION: 99 % | WEIGHT: 156 LBS | BODY MASS INDEX: 26.63 KG/M2 | HEIGHT: 64 IN | HEART RATE: 55 BPM

## 2022-08-10 PROCEDURE — 99204 OFFICE O/P NEW MOD 45 MIN: CPT

## 2022-08-10 NOTE — ASSESSMENT
[FreeTextEntry1] : constipation, h/o brbpr, h/o hemorrhoids, h/o colon polyps recommend colonoscopy to evaluate\par \par Discussed risks including but not limited to bleeding,infection,drug reaction, perforation,missed lesion,benefits and alternatives of colonoscopy/egd with patient  including no\par treatment and patient consents to procedure.\par \par plan colonoscopy to be schedueld\par         rectal cream\par         miralax daily

## 2022-08-10 NOTE — REVIEW OF SYSTEMS
[As Noted in HPI] : as noted in HPI [Fever] : no fever [Chills] : no chills [Eyesight Problems] : no eyesight problems [Discharge From Eyes] : no purulent discharge from the eyes [Nosebleeds] : no nosebleeds [Chest Pain] : no chest pain [Cough] : no cough [SOB on Exertion] : no shortness of breath during exertion [Limb Pain] : no limb pain [Itching] : no itching [Anxiety] : no anxiety [Depression] : no depression [Muscle Weakness] : no muscle weakness [Deepening Of The Voice] : no deepening of the voice [Easy Bleeding] : no tendency for easy bleeding [Easy Bruising] : no tendency for easy bruising

## 2022-08-10 NOTE — PHYSICAL EXAM
[General Appearance - Alert] : alert [General Appearance - In No Acute Distress] : in no acute distress [General Appearance - Well Nourished] : well nourished [General Appearance - Well Developed] : well developed [Sclera] : the sclera and conjunctiva were normal [Hearing Threshold Finger Rub Not Starr] : hearing was normal [Auscultation Breath Sounds / Voice Sounds] : lungs were clear to auscultation bilaterally [Heart Sounds] : normal S1 and S2 [Normal Sphincter Tone] : normal sphincter tone [No Rectal Mass] : no rectal mass [External Hemorrhoid] : external hemorrhoids [No CVA Tenderness] : no ~M costovertebral angle tenderness [Abnormal Walk] : normal gait [] : no rash [Skin Lesions] : no skin lesions [No Focal Deficits] : no focal deficits [Oriented To Time, Place, And Person] : oriented to person, place, and time

## 2022-08-10 NOTE — HISTORY OF PRESENT ILLNESS
[FreeTextEntry1] : 78 yo female with c/o incomplete evacuation of stool, can be large amount brown. patient reports hemorrhoid burst. occassional brbpr, no abdominal pain, no weight loss. Has some brbpr.\par \par last colonoscopy over 20 years ago,ho colon polyps\par no  family h/o h/o colon cancer

## 2022-08-13 ENCOUNTER — APPOINTMENT (OUTPATIENT)
Dept: MRI IMAGING | Facility: CLINIC | Age: 77
End: 2022-08-13

## 2022-08-13 ENCOUNTER — OUTPATIENT (OUTPATIENT)
Dept: OUTPATIENT SERVICES | Facility: HOSPITAL | Age: 77
LOS: 1 days | End: 2022-08-13
Payer: MEDICARE

## 2022-08-13 DIAGNOSIS — M40.209 UNSPECIFIED KYPHOSIS, SITE UNSPECIFIED: ICD-10-CM

## 2022-08-13 DIAGNOSIS — Z00.8 ENCOUNTER FOR OTHER GENERAL EXAMINATION: ICD-10-CM

## 2022-08-13 PROCEDURE — 72146 MRI CHEST SPINE W/O DYE: CPT

## 2022-08-13 PROCEDURE — 72146 MRI CHEST SPINE W/O DYE: CPT | Mod: 26

## 2022-08-22 ENCOUNTER — NON-APPOINTMENT (OUTPATIENT)
Age: 77
End: 2022-08-22

## 2022-08-31 ENCOUNTER — APPOINTMENT (OUTPATIENT)
Dept: ORTHOPEDIC SURGERY | Facility: CLINIC | Age: 77
End: 2022-08-31

## 2022-08-31 VITALS — BODY MASS INDEX: 26.63 KG/M2 | WEIGHT: 156 LBS | HEIGHT: 64 IN

## 2022-08-31 PROCEDURE — 99214 OFFICE O/P EST MOD 30 MIN: CPT

## 2022-08-31 NOTE — PHYSICAL EXAM
[de-identified] : Examination of the cervical and thoracic spine reveals no midline or paraspinal tenderness to palpation. No cervical lymphadenopathy. Decreased range of motion with respect to flexion, extension, rotation, and lateral bending. Negative Spurlings. Negative Lhermitte's. Full range of motion bilateral shoulders without evidence of impingement. No instability of bilateral upper extremities.  Cranial nerves II through XII grossly intact. Intact sensation bilateral upper extremities. 5/5 deltoids biceps triceps wrist extensors wrist flexors finger flexors and hand intrinsics. 1+ biceps triceps and brachioradialis reflexes. Negative Olivas's. 2+ radial pulse. Negative Tinel's over the cubital and carpal tunnel. No skin lesions on the right and left upper extremities. [de-identified] : AP lateral thoracic x-rays reveal some spondylosis without gross instability or aggressive lesions\par \par Thoracic MRI reviewed by me today reveal some spondylosis.  No high-grade neural compression.

## 2022-08-31 NOTE — DISCUSSION/SUMMARY
[de-identified] : We discussed further treatment options.  She will continue with conservative management.  We reviewed her MRI.  She will let me know of any changes or worsening of her symptoms.

## 2022-08-31 NOTE — HISTORY OF PRESENT ILLNESS
[de-identified] : Ms. SVEN MONK  is a 77 year old female who presents to the office for a follow-up visit. \par

## 2022-09-13 ENCOUNTER — APPOINTMENT (OUTPATIENT)
Dept: GASTROENTEROLOGY | Facility: CLINIC | Age: 77
End: 2022-09-13

## 2022-09-13 ENCOUNTER — LABORATORY RESULT (OUTPATIENT)
Age: 77
End: 2022-09-13

## 2022-09-13 LAB — SARS-COV-2 N GENE NPH QL NAA+PROBE: NOT DETECTED

## 2022-09-13 PROCEDURE — 45380 COLONOSCOPY AND BIOPSY: CPT

## 2022-12-09 NOTE — CONSULT NOTE ADULT - ASSESSMENT
77 yo F with HLD presented with left hand dorsum cat bite cellulitis after not improving with PO Augmentin for 3 days.  Cat bite by her own cat on 4/2 morning. She was bitten by the same cat 2 years ago with mild infection that resolved with unknown antibiotcs.  Went to urgent care on 4/3, got Tdap and started on Augmentin.  Came to ED on 4/6 because not improving on Augmentin. Pt took 10 pills total so far (she took extra doses when feeling not getting better).  She had chills on Saturday. No fever, has leukocytosis WBC=14.    #left hand dorsum cat bite cellulitis:  - Not responding to Augmentin  - Left hand first MCP dorsum has an open wound with green pus drainage, will follow up the pus culture  -  77 yo F with HLD presented with left hand dorsum cat bite cellulitis after not improving with PO Augmentin for 3 days.  Cat bite by her own cat on 4/2 morning. She was bitten by the same cat 2 years ago with mild infection that resolved with unknown antibiotcs.  Went to urgent care on 4/3, got Tdap and started on Augmentin.  Came to ED on 4/6 because not improving on Augmentin. Pt took 10 pills total so far (she took extra doses when feeling not getting better).  She had chills on Saturday. No fever, has leukocytosis WBC=14.  Left wrist X-ray: Preserved joint spaces and no joint margin erosions.    #left hand dorsum cat bite cellulitis:  - Not responding to Augmentin  - Left hand first MCP dorsum has an open wound with green pus drainage, will follow up the pus culture  - DC Doxy/CTX/Flagy  - Start Vancomycin 1g q24h tonight and Unasyn 3g q6h IV    Jessica Greco MD, PGY4   ID fellow  Pager: [phone]  After 5pm/weekends call [phone]    d/w Dr. Iglesias  Recs conveyed to primary team 77 yo F with HLD presented with left hand dorsum cat bite cellulitis after not improving with PO Augmentin for 3 days.  Cat bite by her own cat on 4/2 morning. She was bitten by the same cat 2 years ago with mild infection that resolved with unknown antibiotcs.  Went to urgent care on 4/3, got Tdap and started on Augmentin.  Came to ED on 4/6 because not improving on Augmentin. Pt took 10 pills total so far (she took extra doses when feeling not getting better).  She had chills on Saturday. No fever, has leukocytosis WBC=14.  Left wrist X-ray: Preserved joint spaces and no joint margin erosions.    #left hand dorsum cat bite cellulitis:  - Not responding to Augmentin  - Left hand first MCP dorsum has an open wound with green pus drainage, will follow up the pus culture  - DC Doxy/CTX/Flagy  - Start Vancomycin 1g q24h tonight and Unasyn 3g q6h IV  - Check ESR/CRP (can do morning labs or see if it can be added on)  - If fever, get BCx    Jessica Greco MD, PGY4   ID fellow  Pager: [phone]  After 5pm/weekends call [phone]    d/w Dr. Iglesias  Recs conveyed to primary team 77 yo F with HLD presented with left hand dorsum cat bite cellulitis after not improving with PO Augmentin for 3 days.  Cat bite by her own cat on 4/2 morning. She was bitten by the same cat 2 years ago with mild infection that resolved with unknown antibiotcs.  Went to urgent care on 4/3, got Tdap and started on Augmentin.  Came to ED on 4/6 because not improving on Augmentin. Pt took 10 pills total so far (she took extra doses when feeling not getting better).  She had chills on Saturday. No fever, has leukocytosis WBC=14.  Left wrist X-ray: Preserved joint spaces and no joint margin erosions.    #Bilateral hand dorsum cat bite cellulitis:  - Left hand worse than right hand  - Not responding to Augmentin  - Left hand first MCP dorsum has an open wound with green pus drainage, will follow up the pus culture  - DC Doxy/CTX/Flagy  - Start Vancomycin 1g q24h tonight and Unasyn 3g q6h IV  - Check ESR/CRP (can do morning labs or see if it can be added on)  - If fever, get BCx  - Please obtain left hand MRI with IV contrast focusing on first MCP joint to make sure no joint involvement given the limited ROM and deep puncture of the cat teeth wound    Jessica Greco MD, PGY4   ID fellow  Pager: [phone]  After 5pm/weekends call [phone]    d/w Dr. Iglesias  Recs conveyed to primary team resident General

## 2022-12-18 ENCOUNTER — RESULT CHARGE (OUTPATIENT)
Age: 77
End: 2022-12-18

## 2022-12-19 DIAGNOSIS — Z00.00 ENCOUNTER FOR GENERAL ADULT MEDICAL EXAMINATION W/OUT ABNORMAL FINDINGS: ICD-10-CM

## 2023-01-03 LAB
25(OH)D3 SERPL-MCNC: 31.1 NG/ML
ALBUMIN SERPL ELPH-MCNC: 4.4 G/DL
ALP BLD-CCNC: 90 U/L
ALT SERPL-CCNC: 17 U/L
ANION GAP SERPL CALC-SCNC: 12 MMOL/L
AST SERPL-CCNC: 18 U/L
BASOPHILS # BLD AUTO: 0.02 K/UL
BASOPHILS NFR BLD AUTO: 0.2 %
BILIRUB SERPL-MCNC: 0.6 MG/DL
BUN SERPL-MCNC: 15 MG/DL
CALCIUM SERPL-MCNC: 9.9 MG/DL
CHLORIDE SERPL-SCNC: 106 MMOL/L
CHOLEST SERPL-MCNC: 190 MG/DL
CO2 SERPL-SCNC: 24 MMOL/L
CREAT SERPL-MCNC: 0.95 MG/DL
EGFR: 61 ML/MIN/1.73M2
EOSINOPHIL # BLD AUTO: 0.13 K/UL
EOSINOPHIL NFR BLD AUTO: 1.3 %
ESTIMATED AVERAGE GLUCOSE: 117 MG/DL
GLUCOSE SERPL-MCNC: 98 MG/DL
HBA1C MFR BLD HPLC: 5.7 %
HCT VFR BLD CALC: 42.4 %
HDLC SERPL-MCNC: 62 MG/DL
HGB BLD-MCNC: 13.4 G/DL
IMM GRANULOCYTES NFR BLD AUTO: 0.4 %
LDLC SERPL CALC-MCNC: 90 MG/DL
LYMPHOCYTES # BLD AUTO: 3.59 K/UL
LYMPHOCYTES NFR BLD AUTO: 36.4 %
MAN DIFF?: NORMAL
MCHC RBC-ENTMCNC: 26.7 PG
MCHC RBC-ENTMCNC: 31.6 GM/DL
MCV RBC AUTO: 84.5 FL
MONOCYTES # BLD AUTO: 0.54 K/UL
MONOCYTES NFR BLD AUTO: 5.5 %
NEUTROPHILS # BLD AUTO: 5.53 K/UL
NEUTROPHILS NFR BLD AUTO: 56.2 %
NONHDLC SERPL-MCNC: 128 MG/DL
PLATELET # BLD AUTO: 192 K/UL
POTASSIUM SERPL-SCNC: 4 MMOL/L
PROT SERPL-MCNC: 7.1 G/DL
RBC # BLD: 5.02 M/UL
RBC # FLD: 12.7 %
SODIUM SERPL-SCNC: 143 MMOL/L
TRIGL SERPL-MCNC: 187 MG/DL
TSH SERPL-ACNC: 3.14 UIU/ML
WBC # FLD AUTO: 9.85 K/UL

## 2023-01-10 ENCOUNTER — APPOINTMENT (OUTPATIENT)
Dept: INTERNAL MEDICINE | Facility: CLINIC | Age: 78
End: 2023-01-10
Payer: MEDICARE

## 2023-01-10 VITALS
WEIGHT: 151 LBS | BODY MASS INDEX: 25.78 KG/M2 | TEMPERATURE: 97.8 F | OXYGEN SATURATION: 99 % | SYSTOLIC BLOOD PRESSURE: 130 MMHG | HEIGHT: 64 IN | DIASTOLIC BLOOD PRESSURE: 80 MMHG | HEART RATE: 63 BPM
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
## 2023-01-10 DIAGNOSIS — K64.8 OTHER HEMORRHOIDS: ICD-10-CM

## 2023-01-10 DIAGNOSIS — H93.13 TINNITUS, BILATERAL: ICD-10-CM

## 2023-01-10 PROCEDURE — G0439: CPT

## 2023-01-10 PROCEDURE — 93000 ELECTROCARDIOGRAM COMPLETE: CPT | Mod: 59

## 2023-01-10 PROCEDURE — G0444 DEPRESSION SCREEN ANNUAL: CPT | Mod: 59

## 2023-01-10 PROCEDURE — 99214 OFFICE O/P EST MOD 30 MIN: CPT | Mod: 25

## 2023-01-10 NOTE — ASSESSMENT
[FreeTextEntry1] : The patient has received 2 Covid vaccinations as well as 2 booster.  She has not received influenza booster and refuses.  She has never had it and does not intend to start now.  She is warned.\par \par HCM\par \par *Immunizations \par COVID -19 UTD\par Influenza declines\par Pneumococcal - UTD\par TDAP - 2021\par Shingrix - UTD\par \par Preventive medicine discussed - including importance of lifestyle modification - with incorporation of healthy diet + regular exercise\par \par \par #HLD \par lifestyle modification discussed at length given elevated TG\par repeat lipid profile 3 months\par continue statin as prescribed \par \par

## 2023-01-10 NOTE — HISTORY OF PRESENT ILLNESS
[FreeTextEntry1] : Comprehensive annual examination [de-identified] : Feels well. Orthopedic problems including her hips and scoliosis.

## 2023-01-10 NOTE — HEALTH RISK ASSESSMENT
[Very Good] : ~his/her~  mood as very good [Never] : Never [Never (0 pts)] : Never (0 points) [No] : In the past 12 months have you used drugs other than those required for medical reasons? No [No falls in past year] : Patient reported no falls in the past year [0] : 2) Feeling down, depressed, or hopeless: Not at all (0) [Patient reported bone density results were normal] : Patient reported bone density results were normal [Patient declined colonoscopy] : Patient declined colonoscopy [HIV test declined] : HIV test declined [Hepatitis C test declined] : Hepatitis C test declined [None] : None [Alone] : lives alone [Unemployed] : unemployed [] :  [Fully functional (bathing, dressing, toileting, transferring, walking, feeding)] : Fully functional (bathing, dressing, toileting, transferring, walking, feeding) [Fully functional (using the telephone, shopping, preparing meals, housekeeping, doing laundry, using] : Fully functional and needs no help or supervision to perform IADLs (using the telephone, shopping, preparing meals, housekeeping, doing laundry, using transportation, managing medications and managing finances) [Reports normal functional visual acuity (ie: able to read med bottle)] : Reports normal functional visual acuity [Smoke Detector] : smoke detector [Carbon Monoxide Detector] : carbon monoxide detector [Safety elements used in home] : safety elements used in home [Seat Belt] :  uses seat belt [Sunscreen] : uses sunscreen [PHQ-2 Negative - No further assessment needed] : PHQ-2 Negative - No further assessment needed [Patient declined PAP Smear] : Patient declined PAP Smear [# Of Children ___] : has [unfilled] children [FreeTextEntry1] : General health [de-identified] : GI [de-identified] : Walking, stairs  [de-identified] : Healthy, inconsistent [[id number]] : 0 [Change in mental status noted] : No change in mental status noted [Language] : denies difficulty with language [Handling Complex Tasks] : denies difficulty handling complex tasks [Sexually Active] : not sexually active [High Risk Behavior] : no high risk behavior [Reports changes in hearing] : Reports no changes in hearing [Reports changes in vision] : Reports no changes in vision [Reports changes in dental health] : Reports no changes in dental health [Guns at Home] : no guns at home [Travel to Developing Areas] : does not  travel to developing areas [TB Exposure] : is not being exposed to tuberculosis [Caregiver Concerns] : does not have caregiver concerns [MammogramDate] : 12/2017 [PapSmearComments] : defers further [BoneDensityDate] : 4/2015 [ColonoscopyDate] : 9/2022 [ColonoscopyComments] : defers further [FreeTextEntry3] : 3 grandchildren

## 2023-04-25 ENCOUNTER — APPOINTMENT (OUTPATIENT)
Dept: ORTHOPEDIC SURGERY | Facility: CLINIC | Age: 78
End: 2023-04-25
Payer: MEDICARE

## 2023-04-25 VITALS
SYSTOLIC BLOOD PRESSURE: 134 MMHG | HEART RATE: 61 BPM | BODY MASS INDEX: 26.46 KG/M2 | TEMPERATURE: 97.2 F | DIASTOLIC BLOOD PRESSURE: 78 MMHG | OXYGEN SATURATION: 98 % | HEIGHT: 64 IN | WEIGHT: 155 LBS

## 2023-04-25 DIAGNOSIS — M25.552 PAIN IN LEFT HIP: ICD-10-CM

## 2023-04-25 PROCEDURE — 73502 X-RAY EXAM HIP UNI 2-3 VIEWS: CPT

## 2023-04-25 PROCEDURE — 72100 X-RAY EXAM L-S SPINE 2/3 VWS: CPT

## 2023-04-25 PROCEDURE — 99214 OFFICE O/P EST MOD 30 MIN: CPT

## 2023-04-25 NOTE — DISCUSSION/SUMMARY
[de-identified] : Venous Vidhya believe that the patient's pain is mostly coming from her lumbar spine.  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  I have discussed the pathology, natural history and treatment options with her.  She will follow-up with the spine service.  Serial for her left hip at this time.

## 2023-04-25 NOTE — PHYSICAL EXAM
[DP] : dorsalis pedis 2+ and symmetric bilaterally [PT] : posterior tibial 2+ and symmetric bilaterally [Normal] : Alert and in no acute distress [Poor Appearance] : well-appearing [Obese] : not obese [de-identified] : The patient has no respiratory distress. Mood and affect are normal. The patient is alert and oriented to person, place and time.\par Examination of the lumbar spine demonstrates tenderness to the left of the midline. There is mild muscle spasm. There is no deformity. Lumbar flexion is 90°, right lateral flexion 10° and left lateral flexion 10°. Straight leg raise test is negative. Lower extremity neurologic exam is intact with regard to sensation, motor function and deep tendon reflexes.  Trendelenburg is negative.  There is no pain with active or passive motion of either hip.  There is mild stiffness of both hips.  There is mild pain with knee motion.  Calves are soft and nontender.  The skin is intact.  There is no lymphedema. [de-identified] : AP and lateral x-rays of the lumbar spine taken today demonstrate no fracture or dislocation.  There are degenerative changes.  AP and lateral x-rays of the left hip taken today with AP of the pelvis demonstrates mild arthritic changes.

## 2023-04-25 NOTE — HISTORY OF PRESENT ILLNESS
[de-identified] : 78 year old female presents for initial evaluation of chronic left hip pain. Denies trauma or injury. She complains of intermittent dull pain in the hip which radiates around the low back. She has pain with prolonged walking, standing and sleeping on the left side. She is unable to walk more than 4-5 blocks due to her pain. She hears some clicking in the hip while walking. She has seen Dr. Herrera for her mid back in the past who prescribed her oxycodone which she takes occasionally for her pain. Denies paresthesias. Denies prior injury or treatment for the hip or low back.

## 2023-06-05 ENCOUNTER — APPOINTMENT (OUTPATIENT)
Dept: ORTHOPEDIC SURGERY | Facility: CLINIC | Age: 78
End: 2023-06-05
Payer: MEDICARE

## 2023-06-05 VITALS
HEART RATE: 63 BPM | WEIGHT: 155 LBS | DIASTOLIC BLOOD PRESSURE: 84 MMHG | OXYGEN SATURATION: 98 % | HEIGHT: 64 IN | SYSTOLIC BLOOD PRESSURE: 158 MMHG | TEMPERATURE: 97.9 F | BODY MASS INDEX: 26.46 KG/M2

## 2023-06-05 DIAGNOSIS — M47.817 SPONDYLOSIS W/OUT MYELOPATHY OR RADICULOPATHY, LUMBOSACRAL REGION: ICD-10-CM

## 2023-06-05 DIAGNOSIS — M43.16 SPONDYLOLISTHESIS, LUMBAR REGION: ICD-10-CM

## 2023-06-05 PROCEDURE — 72100 X-RAY EXAM L-S SPINE 2/3 VWS: CPT

## 2023-06-05 PROCEDURE — 99214 OFFICE O/P EST MOD 30 MIN: CPT

## 2023-06-05 NOTE — HISTORY OF PRESENT ILLNESS
[de-identified] : Ms. SVEN MONK  is a 77 year old female who presents to the office for a follow-up visit. Continues to have back pain and left hip pain. Was seen by Dr. Haddad, who recommended re-evaluation for the lower back, as the hip seemed less of the etiology for her symptoms. She presents today for re-evaluation and further treatment options.\par \par Upon detailed questioning of the patient, they deny any complaints of fever, chills, sweats, nausea or vomiting, bowel or bladder dysfunction, recent weight loss or gain, or night pain. The above history is in addition to the intake form, completed by the patient, that I personally reviewed and discussed with her at length during this visit. There is no correlation identified between their presenting symptoms and their family, social and past medical history.\par \par

## 2023-06-05 NOTE — DISCUSSION/SUMMARY
[de-identified] : We discussed further treatment options.  She will try course of physical therapy.  MRIs if not improved or worsen.

## 2023-06-05 NOTE — PHYSICAL EXAM
[Antalgic] : not antalgic [de-identified] : Examination of the lumbar spine reveals no midline tenderness palpation, step-offs, or skin lesions. Decreased range of motion with respect to flexion, extension, lateral bending, and rotation. No tenderness to palpation of the sciatic notch. No tenderness palpation of the bilateral greater trochanters. No pain with passive internal/external rotation of the hips. No instability of bilateral lower extremities.  Negative TAN. Negative straight leg raise bilaterally. No bowstring. Negative femoral stretch. 5 out of 5 iliopsoas, hip abductors, hips adductors, quadriceps, hamstrings, gastrocsoleus, tibialis anterior, extensor hallucis longus, peroneals. Grossly intact sensation to light touch bilateral lower extremities. 1+ patellar and Achilles reflexes. Downgoing Babinski. No clonus. Intact proprioception. Palpable pulses. No skin lesion and no edema on the right and left lower extremities. [de-identified] : AP lateral thoracic x-rays reveal some spondylosis without gross instability or aggressive lesions\par \par Thoracic MRI reviewed by me today reveal some spondylosis.  No high-grade neural compression.\par \par AP lateral lumbar x-rays with some spondylosis and some slight L4-S1 spondylolisthesis.  No aggressive lesions.

## 2023-06-29 ENCOUNTER — APPOINTMENT (OUTPATIENT)
Dept: GASTROENTEROLOGY | Facility: CLINIC | Age: 78
End: 2023-06-29

## 2023-07-17 ENCOUNTER — APPOINTMENT (OUTPATIENT)
Dept: ORTHOPEDIC SURGERY | Facility: CLINIC | Age: 78
End: 2023-07-17

## 2023-07-26 ENCOUNTER — APPOINTMENT (OUTPATIENT)
Dept: ORTHOPEDIC SURGERY | Facility: CLINIC | Age: 78
End: 2023-07-26
Payer: MEDICARE

## 2023-07-26 VITALS — BODY MASS INDEX: 27.31 KG/M2 | HEIGHT: 64 IN | WEIGHT: 160 LBS

## 2023-07-26 DIAGNOSIS — M47.814 SPONDYLOSIS W/OUT MYELOPATHY OR RADICULOPATHY, THORACIC REGION: ICD-10-CM

## 2023-07-26 PROCEDURE — 99214 OFFICE O/P EST MOD 30 MIN: CPT

## 2023-07-26 NOTE — DISCUSSION/SUMMARY
[de-identified] : We discussed further treatment options.  This point she has back pain and thoracic region.  Therapy has helped her lumbar symptoms.  She would like to try some therapy targeting her thoracic region.  Prescription was given.  Follow-up afterwards or sooner with any changes or worsening of her symptoms.

## 2023-07-26 NOTE — HISTORY OF PRESENT ILLNESS
[de-identified] : Ms. SVEN MONK  is a 77 year old female who presents to the office for a follow-up visit.  She feels her low back pain is better while doing PT.  She has burning in her mid thoracic area. \par \par

## 2023-07-26 NOTE — PHYSICAL EXAM
[Antalgic] : not antalgic [de-identified] : Examination of the lumbar spine reveals no midline tenderness palpation, step-offs, or skin lesions. Decreased range of motion with respect to flexion, extension, lateral bending, and rotation. No tenderness to palpation of the sciatic notch. No tenderness palpation of the bilateral greater trochanters. No pain with passive internal/external rotation of the hips. No instability of bilateral lower extremities.  Negative TAN. Negative straight leg raise bilaterally. No bowstring. Negative femoral stretch. 5 out of 5 iliopsoas, hip abductors, hips adductors, quadriceps, hamstrings, gastrocsoleus, tibialis anterior, extensor hallucis longus, peroneals. Grossly intact sensation to light touch bilateral lower extremities. 1+ patellar and Achilles reflexes. Downgoing Babinski. No clonus. Intact proprioception. Palpable pulses. No skin lesion and no edema on the right and left lower extremities. [de-identified] : AP lateral thoracic x-rays reveal some spondylosis without gross instability or aggressive lesions\par \par Thoracic MRI reviewed by me today reveal some spondylosis.  No high-grade neural compression.\par \par AP lateral lumbar x-rays with some spondylosis and some slight L4-S1 spondylolisthesis.  No aggressive lesions.

## 2023-07-28 ENCOUNTER — NON-APPOINTMENT (OUTPATIENT)
Age: 78
End: 2023-07-28

## 2023-11-03 NOTE — PATIENT PROFILE ADULT - NSPROPTRIGHTSUPPORTPERSON_GEN_A_NUR
Refill Request     CONFIRM preferrred pharmacy with the patient. If Mail Order Rx - Pend for 90 day refill. Last Seen: Last Seen Department: 8/23/2023  Last Seen by PCP: 8/23/2023    Last Written: 7/14/2023    If no future appointment scheduled, route STAFF MESSAGE with patient name to the [facility] for scheduling. Next Appointment:   Future Appointments   Date Time Provider [address] [place] [address]   11/6/2023 10:20 AM ANICETO Mcclelland CNP, [facility]   1/17/2024 10:00 AM ANICETO Mcclelland CNP, [facility]   1/22/2024  8:45 AM MD [facility]       Message sent to  to schedule appt with patient?   NO      Requested Prescriptions     Pending Prescriptions Disp Refills    metFORMIN (GLUCOPHAGE-XR) 500 MG extended release tablet [Pharmacy Med Name: METFORMIN HCL  MG TABLET] 60 tablet 2     Sig: TAKE 2 TABLETS BY MOUTH DAILY WITH BREAKFAST
declines

## 2023-11-25 ENCOUNTER — NON-APPOINTMENT (OUTPATIENT)
Age: 78
End: 2023-11-25

## 2023-12-04 ENCOUNTER — APPOINTMENT (OUTPATIENT)
Dept: UROLOGY | Facility: CLINIC | Age: 78
End: 2023-12-04
Payer: MEDICARE

## 2023-12-04 VITALS
HEIGHT: 64 IN | OXYGEN SATURATION: 99 % | BODY MASS INDEX: 25.44 KG/M2 | WEIGHT: 149 LBS | HEART RATE: 69 BPM | TEMPERATURE: 98.2 F | SYSTOLIC BLOOD PRESSURE: 152 MMHG | DIASTOLIC BLOOD PRESSURE: 90 MMHG

## 2023-12-04 DIAGNOSIS — Z63.4 DISAPPEARANCE AND DEATH OF FAMILY MEMBER: ICD-10-CM

## 2023-12-04 PROCEDURE — 99204 OFFICE O/P NEW MOD 45 MIN: CPT

## 2023-12-04 SDOH — SOCIAL STABILITY - SOCIAL INSECURITY: DISSAPEARANCE AND DEATH OF FAMILY MEMBER: Z63.4

## 2023-12-05 LAB
APPEARANCE: CLEAR
BACTERIA: NEGATIVE /HPF
BILIRUBIN URINE: NEGATIVE
BLOOD URINE: ABNORMAL
CAST: 1 /LPF
COLOR: YELLOW
EPITHELIAL CELLS: 1 /HPF
GLUCOSE QUALITATIVE U: NEGATIVE MG/DL
KETONES URINE: NEGATIVE MG/DL
LEUKOCYTE ESTERASE URINE: ABNORMAL
MICROSCOPIC-UA: NORMAL
NITRITE URINE: NEGATIVE
PH URINE: 6
PROTEIN URINE: NORMAL MG/DL
RED BLOOD CELLS URINE: 0 /HPF
SPECIFIC GRAVITY URINE: 1.01
URINE CYTOLOGY: NORMAL
UROBILINOGEN URINE: 0.2 MG/DL
WHITE BLOOD CELLS URINE: 8 /HPF

## 2023-12-06 LAB — BACTERIA UR CULT: NORMAL

## 2024-01-02 ENCOUNTER — APPOINTMENT (OUTPATIENT)
Dept: CT IMAGING | Facility: IMAGING CENTER | Age: 79
End: 2024-01-02
Payer: MEDICARE

## 2024-01-02 ENCOUNTER — OUTPATIENT (OUTPATIENT)
Dept: OUTPATIENT SERVICES | Facility: HOSPITAL | Age: 79
LOS: 1 days | End: 2024-01-02
Payer: MEDICARE

## 2024-01-02 DIAGNOSIS — Z87.448 PERSONAL HISTORY OF OTHER DISEASES OF URINARY SYSTEM: ICD-10-CM

## 2024-01-02 PROCEDURE — 74178 CT ABD&PLV WO CNTR FLWD CNTR: CPT

## 2024-01-02 PROCEDURE — 74178 CT ABD&PLV WO CNTR FLWD CNTR: CPT | Mod: 26

## 2024-01-03 ENCOUNTER — TRANSCRIPTION ENCOUNTER (OUTPATIENT)
Age: 79
End: 2024-01-03

## 2024-01-08 ENCOUNTER — NON-APPOINTMENT (OUTPATIENT)
Age: 79
End: 2024-01-08

## 2024-01-10 ENCOUNTER — APPOINTMENT (OUTPATIENT)
Dept: OBGYN | Facility: CLINIC | Age: 79
End: 2024-01-10
Payer: MEDICARE

## 2024-01-10 VITALS
DIASTOLIC BLOOD PRESSURE: 85 MMHG | HEIGHT: 64 IN | WEIGHT: 149 LBS | HEART RATE: 70 BPM | SYSTOLIC BLOOD PRESSURE: 143 MMHG | BODY MASS INDEX: 25.44 KG/M2

## 2024-01-10 PROCEDURE — 58100 BIOPSY OF UTERUS LINING: CPT

## 2024-01-10 PROCEDURE — 99203 OFFICE O/P NEW LOW 30 MIN: CPT | Mod: 25

## 2024-01-10 PROCEDURE — 99204 OFFICE O/P NEW MOD 45 MIN: CPT | Mod: 25

## 2024-01-10 NOTE — PLAN
[FreeTextEntry1] : 79 y/o with postmenopausal bleeding  Plan: - Etiology of PMB discussed with patient, CT scan removed, given findings of thickened endometrium recommend endometrial sampling - pap test done today - EMB done today, tolerated by patient - will f/u results

## 2024-01-10 NOTE — HISTORY OF PRESENT ILLNESS
[FreeTextEntry1] : 77 y/o postmenopausal nulligravid presents with postmenopausal bleeding x1 month. Patient reports crimson colored blood, daily, a little less than a period. Had seen a urologist because she initially thought her bleeding was in her urine. CT scan performed early Jan 2024 also showed thickened endometrium 2cm concerning for neoplasia. She reports occasional left sided pelvic pain, otherwise feels well. Denies bloating, changes in bowel or bladder habits. Denies change in appetite, unintentional weight loss.  OB: nuligravid GYN: denies PMH: HLD PSH: denies FH: denies Meds: statin Allergies: NKDA Social: denies toxic habits

## 2024-01-10 NOTE — PROCEDURE
[Cervical Pap Smear] : cervical Pap smear [Endometrial Biopsy] : Endometrial biopsy [Time out performed] : Pre-procedure time out performed.  Patient's name, date of birth and procedure confirmed. [Consent Obtained] : Consent obtained [Post-Menop. Bleeding] : post-menopausal bleeding [Risks] : risks [Benefits] : benefits [Alternatives] : alternatives [Patient] : patient [Infection] : infection [Bleeding] : bleeding [Uterine Perforation] : uterine perforation [Pain] : pain [N/A] : pregnancy test not applicable [No Premedication] : No premedication [None] : none [Tenaculum] : Tenaculum [Easy Passage] : Easy passage [Moderate] : moderate [Specimen Collected] : collected [Sent to Pathology] : placed in buffered formalin and sent for pathology [Tolerated Well] : Patient tolerated the procedure well [No Complications] : No complications [de-identified] : EMB

## 2024-01-10 NOTE — PHYSICAL EXAM
[Chaperone Present] : A chaperone was present in the examining room during all aspects of the physical examination [Appropriately responsive] : appropriately responsive [Alert] : alert [No Acute Distress] : no acute distress [No Lymphadenopathy] : no lymphadenopathy [Regular Rate Rhythm] : regular rate rhythm [Soft] : soft [Non-tender] : non-tender [Non-distended] : non-distended [No Lesions] : no lesions [No Mass] : no mass [Oriented x3] : oriented x3 [Labia Majora] : normal [Labia Minora] : normal [Normal] : normal [Uterine Adnexae] : non-palpable

## 2024-01-10 NOTE — PLAN
[FreeTextEntry1] : 77 y/o with postmenopausal bleeding  Plan: - Etiology of PMB discussed with patient, CT scan removed, given findings of thickened endometrium recommend endometrial sampling - pap test done today - EMB done today, tolerated by patient - will f/u results

## 2024-01-11 LAB — HPV HIGH+LOW RISK DNA PNL CVX: NOT DETECTED

## 2024-01-12 ENCOUNTER — APPOINTMENT (OUTPATIENT)
Dept: INTERNAL MEDICINE | Facility: CLINIC | Age: 79
End: 2024-01-12
Payer: MEDICARE

## 2024-01-12 ENCOUNTER — NON-APPOINTMENT (OUTPATIENT)
Age: 79
End: 2024-01-12

## 2024-01-12 VITALS
SYSTOLIC BLOOD PRESSURE: 124 MMHG | OXYGEN SATURATION: 100 % | WEIGHT: 148 LBS | TEMPERATURE: 98.1 F | DIASTOLIC BLOOD PRESSURE: 76 MMHG | HEIGHT: 64 IN | BODY MASS INDEX: 25.27 KG/M2 | HEART RATE: 67 BPM

## 2024-01-12 VITALS
HEART RATE: 61 BPM | HEIGHT: 64 IN | DIASTOLIC BLOOD PRESSURE: 61 MMHG | OXYGEN SATURATION: 98 % | SYSTOLIC BLOOD PRESSURE: 124 MMHG | BODY MASS INDEX: 25.27 KG/M2 | WEIGHT: 148 LBS | TEMPERATURE: 98.1 F

## 2024-01-12 DIAGNOSIS — M15.9 POLYOSTEOARTHRITIS, UNSPECIFIED: ICD-10-CM

## 2024-01-12 DIAGNOSIS — E78.00 PURE HYPERCHOLESTEROLEMIA, UNSPECIFIED: ICD-10-CM

## 2024-01-12 DIAGNOSIS — L03.113 CELLULITIS OF RIGHT UPPER LIMB: ICD-10-CM

## 2024-01-12 DIAGNOSIS — M47.816 SPONDYLOSIS W/OUT MYELOPATHY OR RADICULOPATHY, LUMBAR REGION: ICD-10-CM

## 2024-01-12 DIAGNOSIS — R53.83 OTHER FATIGUE: ICD-10-CM

## 2024-01-12 DIAGNOSIS — I65.29 OCCLUSION AND STENOSIS OF UNSPECIFIED CAROTID ARTERY: ICD-10-CM

## 2024-01-12 PROCEDURE — G2211 COMPLEX E/M VISIT ADD ON: CPT

## 2024-01-12 PROCEDURE — 99215 OFFICE O/P EST HI 40 MIN: CPT | Mod: 25

## 2024-01-12 PROCEDURE — 36415 COLL VENOUS BLD VENIPUNCTURE: CPT

## 2024-01-12 PROCEDURE — 93000 ELECTROCARDIOGRAM COMPLETE: CPT | Mod: 59

## 2024-01-12 PROCEDURE — G0439: CPT

## 2024-01-12 PROCEDURE — G0444 DEPRESSION SCREEN ANNUAL: CPT | Mod: 59

## 2024-01-12 NOTE — HISTORY OF PRESENT ILLNESS
[FreeTextEntry1] : Comprehensive annual examination [de-identified] : PMHx - HLD, post-menopausal bleeding   As of recently - noticed bleeding -  went to urologist - recommended gyn eval  Gyn - had EM biopsy - awaiting results    Shingles - Summer 2023 but healed   Completed PT

## 2024-01-12 NOTE — PHYSICAL EXAM
No
[Normal] : no joint swelling and grossly normal strength and tone
[Normal] : no joint swelling and grossly normal strength and tone

## 2024-01-12 NOTE — HEALTH RISK ASSESSMENT
[Very Good] : ~his/her~  mood as very good [Never (0 pts)] : Never (0 points) [No] : In the past 12 months have you used drugs other than those required for medical reasons? No [No falls in past year] : Patient reported no falls in the past year [0] : 2) Feeling down, depressed, or hopeless: Not at all (0) [PHQ-2 Negative - No further assessment needed] : PHQ-2 Negative - No further assessment needed [Patient reported PAP Smear was normal] : Patient reported PAP Smear was normal [Patient reported bone density results were normal] : Patient reported bone density results were normal [Patient declined colonoscopy] : Patient declined colonoscopy [HIV test declined] : HIV test declined [Hepatitis C test declined] : Hepatitis C test declined [None] : None [Alone] : lives alone [Unemployed] : unemployed [] :  [# Of Children ___] : has [unfilled] children [Fully functional (bathing, dressing, toileting, transferring, walking, feeding)] : Fully functional (bathing, dressing, toileting, transferring, walking, feeding) [Fully functional (using the telephone, shopping, preparing meals, housekeeping, doing laundry, using] : Fully functional and needs no help or supervision to perform IADLs (using the telephone, shopping, preparing meals, housekeeping, doing laundry, using transportation, managing medications and managing finances) [Reports normal functional visual acuity (ie: able to read med bottle)] : Reports normal functional visual acuity [Smoke Detector] : smoke detector [Carbon Monoxide Detector] : carbon monoxide detector [Safety elements used in home] : safety elements used in home [Seat Belt] :  uses seat belt [Sunscreen] : uses sunscreen [Never] : Never [FreeTextEntry1] : General health [de-identified] : GI [de-identified] : Walking, stairs  [de-identified] : Healthy, inconsistent [[id number]] : 0 [Change in mental status noted] : No change in mental status noted [Language] : denies difficulty with language [Handling Complex Tasks] : denies difficulty handling complex tasks [Sexually Active] : not sexually active [High Risk Behavior] : no high risk behavior [Reports changes in hearing] : Reports no changes in hearing [Reports changes in vision] : Reports no changes in vision [Reports changes in dental health] : Reports no changes in dental health [Guns at Home] : no guns at home [Travel to Developing Areas] : does not  travel to developing areas [TB Exposure] : is not being exposed to tuberculosis [Caregiver Concerns] : does not have caregiver concerns [MammogramDate] : 12/2017 [MammogramComments] : referral [PapSmearDate] : 1/2024 [PapSmearComments] : defers further [BoneDensityDate] : 4/2015 [BoneDensityComments] : referral [ColonoscopyDate] : 9/2022 [ColonoscopyComments] : defers further [FreeTextEntry3] : 3 grandchildren

## 2024-01-12 NOTE — HEALTH RISK ASSESSMENT
[Very Good] : ~his/her~  mood as very good [Never (0 pts)] : Never (0 points) [No] : In the past 12 months have you used drugs other than those required for medical reasons? No [No falls in past year] : Patient reported no falls in the past year [0] : 2) Feeling down, depressed, or hopeless: Not at all (0) [PHQ-2 Negative - No further assessment needed] : PHQ-2 Negative - No further assessment needed [Patient reported PAP Smear was normal] : Patient reported PAP Smear was normal [Patient reported bone density results were normal] : Patient reported bone density results were normal [Patient declined colonoscopy] : Patient declined colonoscopy [HIV test declined] : HIV test declined [Hepatitis C test declined] : Hepatitis C test declined [None] : None [Alone] : lives alone [Unemployed] : unemployed [] :  [# Of Children ___] : has [unfilled] children [Fully functional (bathing, dressing, toileting, transferring, walking, feeding)] : Fully functional (bathing, dressing, toileting, transferring, walking, feeding) [Fully functional (using the telephone, shopping, preparing meals, housekeeping, doing laundry, using] : Fully functional and needs no help or supervision to perform IADLs (using the telephone, shopping, preparing meals, housekeeping, doing laundry, using transportation, managing medications and managing finances) [Reports normal functional visual acuity (ie: able to read med bottle)] : Reports normal functional visual acuity [Smoke Detector] : smoke detector [Carbon Monoxide Detector] : carbon monoxide detector [Safety elements used in home] : safety elements used in home [Seat Belt] :  uses seat belt [Sunscreen] : uses sunscreen [Never] : Never [FreeTextEntry1] : General health [de-identified] : GI [de-identified] : Walking, stairs  [de-identified] : Healthy, inconsistent [[id number]] : 0 [Change in mental status noted] : No change in mental status noted [Language] : denies difficulty with language [Handling Complex Tasks] : denies difficulty handling complex tasks [Sexually Active] : not sexually active [High Risk Behavior] : no high risk behavior [Reports changes in hearing] : Reports no changes in hearing [Reports changes in vision] : Reports no changes in vision [Reports changes in dental health] : Reports no changes in dental health [Guns at Home] : no guns at home [Travel to Developing Areas] : does not  travel to developing areas [TB Exposure] : is not being exposed to tuberculosis [Caregiver Concerns] : does not have caregiver concerns [MammogramDate] : 12/2017 [MammogramComments] : referral [PapSmearDate] : 1/2024 [PapSmearComments] : defers further [BoneDensityDate] : 4/2015 [BoneDensityComments] : referral [ColonoscopyDate] : 9/2022 [ColonoscopyComments] : defers further [FreeTextEntry3] : 3 grandchildren

## 2024-01-12 NOTE — PLAN
[FreeTextEntry1] : HCM    *Immunizations  COVID -19 UTD  Influenza declines  Pneumococcal - UTD  TDAP - 2021  Shingrix - UTD    Preventive medicine discussed - including importance of lifestyle modification - with incorporation of healthy diet + regular exercise      #HLD  lifestyle modification discussed at length given elevated TG  repeat lipid profile 3 months  continue statin as prescribed  #PMB  Ct urogram negative - showing thick EM [ ] EM biopsy pending [ ] cystoscopy    [ ] mammogram  [ ] bone scan

## 2024-01-12 NOTE — HISTORY OF PRESENT ILLNESS
[FreeTextEntry1] : Comprehensive annual examination [de-identified] : PMHx - HLD, post-menopausal bleeding   As of recently - noticed bleeding -  went to urologist - recommended gyn eval  Gyn - had EM biopsy - awaiting results    Shingles - Summer 2023 but healed   Completed PT

## 2024-01-16 ENCOUNTER — NON-APPOINTMENT (OUTPATIENT)
Age: 79
End: 2024-01-16

## 2024-01-16 LAB — CYTOLOGY CVX/VAG DOC THIN PREP: ABNORMAL

## 2024-01-17 ENCOUNTER — NON-APPOINTMENT (OUTPATIENT)
Age: 79
End: 2024-01-17

## 2024-01-18 LAB
25(OH)D3 SERPL-MCNC: 32.8 NG/ML
ALBUMIN SERPL ELPH-MCNC: 4.6 G/DL
ALP BLD-CCNC: 97 U/L
ALT SERPL-CCNC: 17 U/L
ANION GAP SERPL CALC-SCNC: 14 MMOL/L
AST SERPL-CCNC: 21 U/L
BILIRUB SERPL-MCNC: 0.5 MG/DL
BUN SERPL-MCNC: 14 MG/DL
CALCIUM SERPL-MCNC: 10.4 MG/DL
CHLORIDE SERPL-SCNC: 103 MMOL/L
CHOLEST SERPL-MCNC: 194 MG/DL
CO2 SERPL-SCNC: 21 MMOL/L
CREAT SERPL-MCNC: 1.08 MG/DL
EGFR: 52 ML/MIN/1.73M2
GLUCOSE SERPL-MCNC: 105 MG/DL
HDLC SERPL-MCNC: 70 MG/DL
LDLC SERPL CALC-MCNC: 93 MG/DL
NONHDLC SERPL-MCNC: 123 MG/DL
POTASSIUM SERPL-SCNC: 4 MMOL/L
PROT SERPL-MCNC: 7.7 G/DL
SODIUM SERPL-SCNC: 138 MMOL/L
T4 FREE SERPL-MCNC: 1.3 NG/DL
TRIGL SERPL-MCNC: 179 MG/DL
TSH SERPL-ACNC: 2.6 UIU/ML

## 2024-01-22 ENCOUNTER — APPOINTMENT (OUTPATIENT)
Dept: UROLOGY | Facility: CLINIC | Age: 79
End: 2024-01-22
Payer: MEDICARE

## 2024-01-22 VITALS
OXYGEN SATURATION: 96 % | TEMPERATURE: 98 F | DIASTOLIC BLOOD PRESSURE: 84 MMHG | HEART RATE: 70 BPM | SYSTOLIC BLOOD PRESSURE: 153 MMHG | RESPIRATION RATE: 16 BRPM

## 2024-01-22 DIAGNOSIS — Z87.448 PERSONAL HISTORY OF OTHER DISEASES OF URINARY SYSTEM: ICD-10-CM

## 2024-01-22 PROBLEM — C54.1 ENDOMETRIAL CANCER DETERMINED BY UTERINE BIOPSY: Status: ACTIVE | Noted: 2024-01-22

## 2024-01-22 PROCEDURE — 52000 CYSTOURETHROSCOPY: CPT

## 2024-01-23 ENCOUNTER — APPOINTMENT (OUTPATIENT)
Dept: GYNECOLOGIC ONCOLOGY | Facility: CLINIC | Age: 79
End: 2024-01-23
Payer: MEDICARE

## 2024-01-23 VITALS
WEIGHT: 145 LBS | HEART RATE: 64 BPM | DIASTOLIC BLOOD PRESSURE: 81 MMHG | SYSTOLIC BLOOD PRESSURE: 152 MMHG | BODY MASS INDEX: 24.75 KG/M2 | HEIGHT: 64 IN

## 2024-01-23 DIAGNOSIS — N95.0 POSTMENOPAUSAL BLEEDING: ICD-10-CM

## 2024-01-23 DIAGNOSIS — Z86.39 PERSONAL HISTORY OF OTHER ENDOCRINE, NUTRITIONAL AND METABOLIC DISEASE: ICD-10-CM

## 2024-01-23 DIAGNOSIS — Z80.0 FAMILY HISTORY OF MALIGNANT NEOPLASM OF DIGESTIVE ORGANS: ICD-10-CM

## 2024-01-23 DIAGNOSIS — C54.1 MALIGNANT NEOPLASM OF ENDOMETRIUM: ICD-10-CM

## 2024-01-23 PROCEDURE — 99205 OFFICE O/P NEW HI 60 MIN: CPT

## 2024-01-23 RX ORDER — POLYETHYLENE GLYCOL 3350 17 G/17G
17 POWDER, FOR SOLUTION ORAL DAILY
Qty: 30 | Refills: 3 | Status: DISCONTINUED | COMMUNITY
Start: 2022-08-10 | End: 2024-01-23

## 2024-01-23 NOTE — HISTORY OF PRESENT ILLNESS
[FreeTextEntry1] : GYN/Ref: Kermit Murray MD PCP:  Dr. Angelita Bermudez Urology: Dr. Peggy Edmond  Ms. Hernández, 78 years old, referred for endometrial cancer.  IHC shows loss of nuclear expression and MLH1 and PMS2.  Methylation testing is pending.  Patient initially had spotting on her toilet paper after urinating.  She went to urgent care and urine culture returned negative.  She was advised to follow-up with urology.  Urology also sent urine culture, cytology which were both negative.  A CT urogram was completed and identified a thickened endometrium of 26 mm.  She was referred then to see GYN.  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  She is referred for further management.  Denies f/c/n/v/d/abnormal vaginal bleeding.  Denies changes to bowel or bladder habits; denies unexplained weight loss or gain.  She does have occasionally left sided flank/back/hip pain mostly at night.  Denies abdominal pressure, bloating or any other associated symptoms.   Final pathology: 1/10/2024 EMB ([facility]) Endometrium; biopsy:      - Endometrial carcinoma with mucinous features, FIGO grade 1.  MLH1 : Loss of nuclear expression MSH2 :   Intact nuclear expression MSH6:   Intact nuclear expression PMS2 : Loss of nuclear expression Interpretation:  There is a loss of nuclear expression in MLH1 and PMS2, while MSH2 and MSH6 show intact nuclear expression   . 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     MLH1 Promoter Methylation   testing is pending.  Imagin2024: CT urogram A/P ([facility]) LOWER CHEST: Within normal limits. LIVER: A subcentimeter hypodense focus in the right hepatic lobe, too small to characterize and likely a cyst. BILE DUCTS: Normal caliber. GALLBLADDER: Within normal limits. SPLEEN: Within normal limits. PANCREAS: Within normal limits. ADRENALS: Within normal limits. KIDNEYS/URETERS: No renal stones or hydronephrosis. Symmetric renal enhancement. No renal mass or evidence of a urothelial lesion. BLADDER: Within normal limits. REPRODUCTIVE ORGANS: Abnormally thickened endometrium measuring up to 2.6 cm in thickness concerning for an endometrial neoplasm. BOWEL: Small hiatal hernia. No bowel obstruction. Appendix is normal. PERITONEUM: No ascites. VESSELS: Atherosclerotic changes. RETROPERITONEUM/LYMPH NODES: No lymphadenopathy. ABDOMINAL WALL: Within normal limits. BONES: Degenerative changes. IMPRESSION: Markedly thickened, abnormal endometrium concerning for an endometrial neoplasm. No renal stones, renal masses or evidence of a urothelial lesion.  POB: G1, P1 (vaginal delivery, daughter age 57) PGYN: Menarche age 14 LMP 50s PMH: Hyperlipidemia Meds: Atorvastatin, vitamin D, Osteo Bi-Flex Allergies: NKDA PSH: None Social Hx: Lives alone, non-smoker, occasional alcohol, no illicit drugs FH: Maternal grandfather-stomach cancer-age 70  Health Maintenance: Mammogram:  -no issues with breast health.  Failed to follow-up due to COVID pandemic and chronic back pain Colonoscopy: 2022.  Next due . PAP: 1/10/2024: Negative for intraepithelial lesion or malignancy.  - HPV mRNA ([place])

## 2024-01-23 NOTE — ASSESSMENT
[FreeTextEntry1] : 78 yo with PMB and recent endometrial sampling revealing FIGO grade 1 endometrial cancer here for initial consultation.

## 2024-01-23 NOTE — REVIEW OF SYSTEMS
[Negative] : Gastrointestinal [Abn Vag Bleeding] : abnormal vaginal bleeding [Hematuria] : hematuria [FreeTextEntry4] : left hip and low back pain

## 2024-01-23 NOTE — PHYSICAL EXAM
[Chaperone Present] : A chaperone was present in the examining room during all aspects of the physical examination [Normal] : Anus and perineum: Normal sphincter tone, no masses, no prolapse. [Fully active, able to carry on all pre-disease performance without restriction] : Status 0 - Fully active, able to carry on all pre-disease performance without restriction [FreeTextEntry1] : Nancy GOLDMAN

## 2024-01-23 NOTE — DISCUSSION/SUMMARY
[Reviewed Clinical Lab Test(s)] : Results of clinical tests were reviewed. [Reviewed Radiology Report(s)] : Radiology reports were reviewed. [Discuss Tests w/Referring Providers] : Results of labs/radiology studies and the treatment recommendations were discussed with performing/referring physician. [Discuss Alternatives/Risks/Benefits w/Patient] : All alternatives, risks, and benefits were discussed with the patient/family and all questions were answered.  Patient expressed good understanding and appreciates the importance of follow up as recommended. [Visit Time ___ Minutes] : [unfilled] minutes [Face to Face Time___ Minutes] : with [unfilled] minutes in face to face consultation. [FreeTextEntry1] : - Patients history and work up to date reviewed in detail. - Extensive discussion had with the patient and her daughter Adrienne (contact #[phone]) regarding the diagnosis, prognosis and treatment options for endometrial cancer. The use of diagrams and pictures were use to aide in the discussion throughout. My recommendation is for Hysterectomy and BSO with lymphadenectomy, which is standard management of endometrial cancer. We discussed the role of sentinel lymph node mapping. The patient was counseled about the different routes of surgical approaches and my recommendation to proceed via a minimally invasive approach. -Alternatives to management include radiation and chemotherapy. We discussed possibility of postoperative adjuvant therapy, radiation or chemotherapy/radiation. Postoperative expectations and recovery were discussed in detail. - Plan for PCP follow up for perioperative optimization recommendations and medical clearance. - We discussed risks and benefits of surgery as well as the typical post operative course.  We also discussed potential alternatives to surgery. Ample time was allowed for questions to be asked and solicited.  All were answered and the patient expressed understanding.    We discussed the risks of surgery which include, but are not limited to:  -Bleeding that may require a blood transfusion  -Infection of the surgical incision sites, lungs, urine, kidneys or IV site that may can compromise healing and require IV antibiotics  -Injury to surrounding structures including the bladder, bowel, ureters, urethra, blood vessels, or nerves that may result in a loss of function or sensation.  -Blood clots in the extremities or lungs, which may lead to long term anticoagulation and difficulty breathing -The need for more surgery  After our discussion, all questions were again answered. She is aware of the risks and benefits and would like to proceed.  Patient will be seen by our anesthesia colleagues in pre-admission testing and have preoperative labs drawn.  - Plan for baseline TVUS prior to her procedure. To review results via vteb prior to her procedure once returned as well as address any remaining questions she may have. - I spent the time noted on the day of this patient encounter preparing for, providing and documenting the above service. I have counseled and educated the patient on the differential, workup, disease course, and treatment/management plan. Education was provided to the patient during this encounter. All questions and concerns were answered and addressed in detail.

## 2024-01-26 LAB — CORE LAB BIOPSY: NORMAL

## 2024-01-31 ENCOUNTER — APPOINTMENT (OUTPATIENT)
Dept: INTERNAL MEDICINE | Facility: CLINIC | Age: 79
End: 2024-01-31
Payer: MEDICARE

## 2024-01-31 VITALS
WEIGHT: 146 LBS | TEMPERATURE: 98.3 F | OXYGEN SATURATION: 98 % | SYSTOLIC BLOOD PRESSURE: 129 MMHG | HEIGHT: 64 IN | HEART RATE: 68 BPM | BODY MASS INDEX: 24.92 KG/M2 | DIASTOLIC BLOOD PRESSURE: 62 MMHG

## 2024-01-31 DIAGNOSIS — Z01.818 ENCOUNTER FOR OTHER PREPROCEDURAL EXAMINATION: ICD-10-CM

## 2024-01-31 DIAGNOSIS — K59.00 CONSTIPATION, UNSPECIFIED: ICD-10-CM

## 2024-01-31 DIAGNOSIS — M85.80 OTHER SPECIFIED DISORDERS OF BONE DENSITY AND STRUCTURE, UNSPECIFIED SITE: ICD-10-CM

## 2024-01-31 DIAGNOSIS — C54.1 MALIGNANT NEOPLASM OF ENDOMETRIUM: ICD-10-CM

## 2024-01-31 PROCEDURE — 99214 OFFICE O/P EST MOD 30 MIN: CPT

## 2024-01-31 RX ORDER — CHOLECALCIFEROL (VITAMIN D3) 50 MCG
2000 CAPSULE ORAL
Refills: 0 | Status: DISCONTINUED | COMMUNITY
End: 2024-01-31

## 2024-01-31 RX ORDER — CHOLECALCIFEROL (VITAMIN D3) 25 MCG
TABLET ORAL
Refills: 0 | Status: DISCONTINUED | COMMUNITY
End: 2024-01-31

## 2024-01-31 RX ORDER — HYDROCORTISONE 2.5% 25 MG/G
2.5 CREAM TOPICAL DAILY
Qty: 1 | Refills: 1 | Status: DISCONTINUED | COMMUNITY
Start: 2022-08-10 | End: 2024-01-31

## 2024-01-31 NOTE — PHYSICAL EXAM
[Normal] : no acute distress, well nourished, well developed and well-appearing [Supple] : supple [Clear to Auscultation] : lungs were clear to auscultation bilaterally [Normal S1, S2] : normal S1 and S2

## 2024-02-01 ENCOUNTER — APPOINTMENT (OUTPATIENT)
Dept: ULTRASOUND IMAGING | Facility: CLINIC | Age: 79
End: 2024-02-01
Payer: MEDICARE

## 2024-02-01 ENCOUNTER — OUTPATIENT (OUTPATIENT)
Dept: OUTPATIENT SERVICES | Facility: HOSPITAL | Age: 79
LOS: 1 days | End: 2024-02-01
Payer: MEDICARE

## 2024-02-01 DIAGNOSIS — C54.1 MALIGNANT NEOPLASM OF ENDOMETRIUM: ICD-10-CM

## 2024-02-01 PROCEDURE — 76830 TRANSVAGINAL US NON-OB: CPT | Mod: 26

## 2024-02-01 PROCEDURE — 76856 US EXAM PELVIC COMPLETE: CPT | Mod: 26

## 2024-02-01 PROCEDURE — 76856 US EXAM PELVIC COMPLETE: CPT

## 2024-02-01 PROCEDURE — 76830 TRANSVAGINAL US NON-OB: CPT

## 2024-02-06 ENCOUNTER — OUTPATIENT (OUTPATIENT)
Dept: OUTPATIENT SERVICES | Facility: HOSPITAL | Age: 79
LOS: 1 days | End: 2024-02-06

## 2024-02-06 VITALS
WEIGHT: 145.06 LBS | TEMPERATURE: 98 F | RESPIRATION RATE: 16 BRPM | DIASTOLIC BLOOD PRESSURE: 85 MMHG | HEIGHT: 61 IN | OXYGEN SATURATION: 99 % | HEART RATE: 61 BPM | SYSTOLIC BLOOD PRESSURE: 132 MMHG

## 2024-02-06 DIAGNOSIS — Z87.39 PERSONAL HISTORY OF OTHER DISEASES OF THE MUSCULOSKELETAL SYSTEM AND CONNECTIVE TISSUE: Chronic | ICD-10-CM

## 2024-02-06 DIAGNOSIS — Z92.89 PERSONAL HISTORY OF OTHER MEDICAL TREATMENT: Chronic | ICD-10-CM

## 2024-02-06 DIAGNOSIS — M47.816 SPONDYLOSIS WITHOUT MYELOPATHY OR RADICULOPATHY, LUMBAR REGION: Chronic | ICD-10-CM

## 2024-02-06 DIAGNOSIS — C54.1 MALIGNANT NEOPLASM OF ENDOMETRIUM: ICD-10-CM

## 2024-02-06 DIAGNOSIS — N95.0 POSTMENOPAUSAL BLEEDING: Chronic | ICD-10-CM

## 2024-02-06 DIAGNOSIS — Z87.448 PERSONAL HISTORY OF OTHER DISEASES OF URINARY SYSTEM: Chronic | ICD-10-CM

## 2024-02-06 LAB
BLD GP AB SCN SERPL QL: NEGATIVE — SIGNIFICANT CHANGE UP
RH IG SCN BLD-IMP: POSITIVE — SIGNIFICANT CHANGE UP
RH IG SCN BLD-IMP: POSITIVE — SIGNIFICANT CHANGE UP

## 2024-02-06 RX ORDER — SODIUM CHLORIDE 9 MG/ML
1000 INJECTION, SOLUTION INTRAVENOUS
Refills: 0 | Status: DISCONTINUED | OUTPATIENT
Start: 2024-02-12 | End: 2024-02-12

## 2024-02-06 RX ORDER — SODIUM CHLORIDE 9 MG/ML
3 INJECTION INTRAMUSCULAR; INTRAVENOUS; SUBCUTANEOUS EVERY 8 HOURS
Refills: 0 | Status: DISCONTINUED | OUTPATIENT
Start: 2024-02-12 | End: 2024-02-13

## 2024-02-06 NOTE — H&P PST ADULT - NEGATIVE ENMT SYMPTOMS
no hearing difficulty/no ear pain/no nasal congestion/no nasal discharge/no abnormal taste sensation/no gum bleeding/no dry mouth/no throat pain/no dysphagia

## 2024-02-06 NOTE — H&P PST ADULT - NSICDXPASTMEDICALHX_GEN_ALL_CORE_FT
PAST MEDICAL HISTORY:  Hematuria     HLD (hyperlipidemia)     Lumbar spondylosis     Malignant neoplasm of endometrium     Osteopenia     PMB (postmenopausal bleeding)

## 2024-02-06 NOTE — H&P PST ADULT - PROBLEM SELECTOR PLAN 1
preop for robotic assisted total laparoscopic hysterectomy, bilateral salpingo oophorectomy, sentinel lymph node mapping on 2/12/24  preop instructions given, pt verbalized understanding  GI prophylaxis and chlorhexidine wash provided  type and retype pending   lab results in chart

## 2024-02-06 NOTE — H&P PST ADULT - HISTORY OF PRESENT ILLNESS
78 y/o female presents to PST preop for robotic assisted total laparoscopic hysterectomy, bilateral salpingo oophorectomy, sentinel lymph node mapping. Pt reports spotting started in December 2023. She was a referred by urology for a CT urogram which noted thickened endometrium. Pt referred to GYN and s/p EMB. Pathology confirmed stage 1 endometrial cancer.  78 y/o female presents to PST preop for robotic assisted total laparoscopic hysterectomy, bilateral salpingo oophorectomy, sentinel lymph node mapping. Pt reports vaginal spotting started in December 2023. She was a referred by urology for a CT urogram which noted thickened endometrium. Pt referred to GYN and s/p EMB. Pathology confirmed stage 1 endometrial cancer.

## 2024-02-06 NOTE — H&P PST ADULT - NEUROLOGICAL
negative cranial nerves II-XII intact/sensation intact/responds to pain/responds to verbal commands/deep reflexes intact

## 2024-02-06 NOTE — H&P PST ADULT - NSICDXFAMILYHX_GEN_ALL_CORE_FT
FAMILY HISTORY:  Father  Still living? Unknown  FH: heart attack, Age at diagnosis: Age Unknown    Mother  Still living? Unknown  FH: dementia, Age at diagnosis: Age Unknown

## 2024-02-06 NOTE — H&P PST ADULT - NSANTHOSAYNRD_GEN_A_CORE
No. SUNSHINE screening performed.  STOP BANG Legend: 0-2 = LOW Risk; 3-4 = INTERMEDIATE Risk; 5-8 = HIGH Risk

## 2024-02-06 NOTE — H&P PST ADULT - ASSESSMENT
80 y/o female presents to PST preop for robotic assisted total laparoscopic hysterectomy, bilateral salpingo oophorectomy, sentinel lymph node mapping. Pt reports vaginal spotting started in December 2023. She was a referred by urology for a CT urogram which noted thickened endometrium. Pt referred to GYN and s/p EMB. Pathology confirmed stage 1 endometrial cancer.

## 2024-02-06 NOTE — H&P PST ADULT - ATTENDING COMMENTS
Patient seen in ASU with her daughter, Adrienne. No changes reported. Planned procedure discussed, including EUA by learner, possible conversion to an open procedure. Consent form reviewed. All questions answered. Case discussed with OR circulating RN.    Hue Francis MD

## 2024-02-07 NOTE — HISTORY OF PRESENT ILLNESS
[No Pertinent Pulmonary History] : no history of asthma, COPD, sleep apnea, or smoking [No Adverse Anesthesia Reaction] : no adverse anesthesia reaction in self or family member [(Patient denies any chest pain, claudication, dyspnea on exertion, orthopnea, palpitations or syncope)] : Patient denies any chest pain, claudication, dyspnea on exertion, orthopnea, palpitations or syncope [Poor (<4 METs)] : Poor (<4 METs) [Aortic Stenosis] : no aortic stenosis [Atrial Fibrillation] : no atrial fibrillation [Coronary Artery Disease] : no coronary artery disease [Recent Myocardial Infarction] : no recent myocardial infarction [Implantable Device/Pacemaker] : no implantable device/pacemaker [Chronic Anticoagulation] : no chronic anticoagulation [Diabetes] : no diabetes [FreeTextEntry1] : HYSTERCTOMY [FreeTextEntry2] : 2/12/2024 [FreeTextEntry3] : DR EV TRUONG

## 2024-02-11 ENCOUNTER — TRANSCRIPTION ENCOUNTER (OUTPATIENT)
Age: 79
End: 2024-02-11

## 2024-02-12 ENCOUNTER — TRANSCRIPTION ENCOUNTER (OUTPATIENT)
Age: 79
End: 2024-02-12

## 2024-02-12 ENCOUNTER — RESULT REVIEW (OUTPATIENT)
Age: 79
End: 2024-02-12

## 2024-02-12 ENCOUNTER — NON-APPOINTMENT (OUTPATIENT)
Age: 79
End: 2024-02-12

## 2024-02-12 ENCOUNTER — APPOINTMENT (OUTPATIENT)
Dept: GYNECOLOGIC ONCOLOGY | Facility: HOSPITAL | Age: 79
End: 2024-02-12

## 2024-02-12 ENCOUNTER — INPATIENT (INPATIENT)
Facility: HOSPITAL | Age: 79
LOS: 0 days | Discharge: HOME | End: 2024-02-13
Attending: OBSTETRICS & GYNECOLOGY | Admitting: OBSTETRICS & GYNECOLOGY
Payer: MEDICARE

## 2024-02-12 VITALS
SYSTOLIC BLOOD PRESSURE: 139 MMHG | OXYGEN SATURATION: 100 % | HEART RATE: 71 BPM | DIASTOLIC BLOOD PRESSURE: 63 MMHG | WEIGHT: 145.06 LBS | TEMPERATURE: 98 F | HEIGHT: 64 IN | RESPIRATION RATE: 16 BRPM

## 2024-02-12 DIAGNOSIS — Z92.89 PERSONAL HISTORY OF OTHER MEDICAL TREATMENT: Chronic | ICD-10-CM

## 2024-02-12 DIAGNOSIS — C54.1 MALIGNANT NEOPLASM OF ENDOMETRIUM: ICD-10-CM

## 2024-02-12 LAB
ANION GAP SERPL CALC-SCNC: 12 MMOL/L — SIGNIFICANT CHANGE UP (ref 7–14)
BASOPHILS # BLD AUTO: 0.02 K/UL — SIGNIFICANT CHANGE UP (ref 0–0.2)
BASOPHILS NFR BLD AUTO: 0.1 % — SIGNIFICANT CHANGE UP (ref 0–2)
BUN SERPL-MCNC: 20 MG/DL — SIGNIFICANT CHANGE UP (ref 7–23)
CALCIUM SERPL-MCNC: 9.4 MG/DL — SIGNIFICANT CHANGE UP (ref 8.4–10.5)
CHLORIDE SERPL-SCNC: 107 MMOL/L — SIGNIFICANT CHANGE UP (ref 98–107)
CO2 SERPL-SCNC: 23 MMOL/L — SIGNIFICANT CHANGE UP (ref 22–31)
CREAT SERPL-MCNC: 0.95 MG/DL — SIGNIFICANT CHANGE UP (ref 0.5–1.3)
EGFR: 60 ML/MIN/1.73M2 — SIGNIFICANT CHANGE UP
EOSINOPHIL # BLD AUTO: 0 K/UL — SIGNIFICANT CHANGE UP (ref 0–0.5)
EOSINOPHIL NFR BLD AUTO: 0 % — SIGNIFICANT CHANGE UP (ref 0–6)
GLUCOSE SERPL-MCNC: 149 MG/DL — HIGH (ref 70–99)
HCT VFR BLD CALC: 42.3 % — SIGNIFICANT CHANGE UP (ref 34.5–45)
HGB BLD-MCNC: 13.4 G/DL — SIGNIFICANT CHANGE UP (ref 11.5–15.5)
IANC: 16.57 K/UL — HIGH (ref 1.8–7.4)
IMM GRANULOCYTES NFR BLD AUTO: 0.5 % — SIGNIFICANT CHANGE UP (ref 0–0.9)
LYMPHOCYTES # BLD AUTO: 11 % — LOW (ref 13–44)
LYMPHOCYTES # BLD AUTO: 2.1 K/UL — SIGNIFICANT CHANGE UP (ref 1–3.3)
MAGNESIUM SERPL-MCNC: 1.8 MG/DL — SIGNIFICANT CHANGE UP (ref 1.6–2.6)
MCHC RBC-ENTMCNC: 26.6 PG — LOW (ref 27–34)
MCHC RBC-ENTMCNC: 31.7 GM/DL — LOW (ref 32–36)
MCV RBC AUTO: 83.9 FL — SIGNIFICANT CHANGE UP (ref 80–100)
MONOCYTES # BLD AUTO: 0.37 K/UL — SIGNIFICANT CHANGE UP (ref 0–0.9)
MONOCYTES NFR BLD AUTO: 1.9 % — LOW (ref 2–14)
NEUTROPHILS # BLD AUTO: 16.57 K/UL — HIGH (ref 1.8–7.4)
NEUTROPHILS NFR BLD AUTO: 86.5 % — HIGH (ref 43–77)
NRBC # BLD: 0 /100 WBCS — SIGNIFICANT CHANGE UP (ref 0–0)
NRBC # FLD: 0 K/UL — SIGNIFICANT CHANGE UP (ref 0–0)
PHOSPHATE SERPL-MCNC: 3.6 MG/DL — SIGNIFICANT CHANGE UP (ref 2.5–4.5)
PLATELET # BLD AUTO: 187 K/UL — SIGNIFICANT CHANGE UP (ref 150–400)
POTASSIUM SERPL-MCNC: 4.1 MMOL/L — SIGNIFICANT CHANGE UP (ref 3.5–5.3)
POTASSIUM SERPL-SCNC: 4.1 MMOL/L — SIGNIFICANT CHANGE UP (ref 3.5–5.3)
RBC # BLD: 5.04 M/UL — SIGNIFICANT CHANGE UP (ref 3.8–5.2)
RBC # FLD: 12.9 % — SIGNIFICANT CHANGE UP (ref 10.3–14.5)
SODIUM SERPL-SCNC: 142 MMOL/L — SIGNIFICANT CHANGE UP (ref 135–145)
WBC # BLD: 19.16 K/UL — HIGH (ref 3.8–10.5)
WBC # FLD AUTO: 19.16 K/UL — HIGH (ref 3.8–10.5)

## 2024-02-12 PROCEDURE — 88342 IMHCHEM/IMCYTCHM 1ST ANTB: CPT | Mod: 26

## 2024-02-12 PROCEDURE — 88307 TISSUE EXAM BY PATHOLOGIST: CPT | Mod: 26

## 2024-02-12 PROCEDURE — 38571 LAPAROSCOPY LYMPHADENECTOMY: CPT | Mod: AS

## 2024-02-12 PROCEDURE — 88341 IMHCHEM/IMCYTCHM EA ADD ANTB: CPT | Mod: 26

## 2024-02-12 PROCEDURE — 58571 TLH W/T/O 250 G OR LESS: CPT | Mod: AS

## 2024-02-12 PROCEDURE — 88112 CYTOPATH CELL ENHANCE TECH: CPT | Mod: 26

## 2024-02-12 PROCEDURE — 88309 TISSUE EXAM BY PATHOLOGIST: CPT | Mod: 26

## 2024-02-12 DEVICE — VISTASEAL FIBRIN HUMAN 10ML: Type: IMPLANTABLE DEVICE | Status: FUNCTIONAL

## 2024-02-12 DEVICE — ARISTA 3GR: Type: IMPLANTABLE DEVICE | Status: FUNCTIONAL

## 2024-02-12 RX ORDER — CHLORHEXIDINE GLUCONATE 213 G/1000ML
1 SOLUTION TOPICAL ONCE
Refills: 0 | Status: COMPLETED | OUTPATIENT
Start: 2024-02-12 | End: 2024-02-12

## 2024-02-12 RX ORDER — ACETAMINOPHEN 500 MG
1000 TABLET ORAL EVERY 6 HOURS
Refills: 0 | Status: DISCONTINUED | OUTPATIENT
Start: 2024-02-12 | End: 2024-02-13

## 2024-02-12 RX ORDER — ONDANSETRON 8 MG/1
4 TABLET, FILM COATED ORAL ONCE
Refills: 0 | Status: DISCONTINUED | OUTPATIENT
Start: 2024-02-12 | End: 2024-02-12

## 2024-02-12 RX ORDER — HEPARIN SODIUM 5000 [USP'U]/ML
5000 INJECTION INTRAVENOUS; SUBCUTANEOUS EVERY 8 HOURS
Refills: 0 | Status: DISCONTINUED | OUTPATIENT
Start: 2024-02-12 | End: 2024-02-13

## 2024-02-12 RX ORDER — MAGNESIUM SULFATE 500 MG/ML
2 VIAL (ML) INJECTION ONCE
Refills: 0 | Status: DISCONTINUED | OUTPATIENT
Start: 2024-02-12 | End: 2024-02-13

## 2024-02-12 RX ORDER — SODIUM CHLORIDE 9 MG/ML
1000 INJECTION, SOLUTION INTRAVENOUS
Refills: 0 | Status: DISCONTINUED | OUTPATIENT
Start: 2024-02-12 | End: 2024-02-13

## 2024-02-12 RX ORDER — HYDROMORPHONE HYDROCHLORIDE 2 MG/ML
0.5 INJECTION INTRAMUSCULAR; INTRAVENOUS; SUBCUTANEOUS
Refills: 0 | Status: DISCONTINUED | OUTPATIENT
Start: 2024-02-12 | End: 2024-02-13

## 2024-02-12 RX ORDER — OXYCODONE HYDROCHLORIDE 5 MG/1
5 TABLET ORAL ONCE
Refills: 0 | Status: DISCONTINUED | OUTPATIENT
Start: 2024-02-12 | End: 2024-02-12

## 2024-02-12 RX ORDER — ONDANSETRON 8 MG/1
4 TABLET, FILM COATED ORAL EVERY 8 HOURS
Refills: 0 | Status: DISCONTINUED | OUTPATIENT
Start: 2024-02-12 | End: 2024-02-13

## 2024-02-12 RX ORDER — SENNA PLUS 8.6 MG/1
2 TABLET ORAL AT BEDTIME
Refills: 0 | Status: DISCONTINUED | OUTPATIENT
Start: 2024-02-12 | End: 2024-02-13

## 2024-02-12 RX ORDER — ATORVASTATIN CALCIUM 80 MG/1
1 TABLET, FILM COATED ORAL
Qty: 0 | Refills: 0 | DISCHARGE

## 2024-02-12 RX ORDER — FENTANYL CITRATE 50 UG/ML
50 INJECTION INTRAVENOUS
Refills: 0 | Status: DISCONTINUED | OUTPATIENT
Start: 2024-02-12 | End: 2024-02-12

## 2024-02-12 RX ORDER — OXYCODONE HYDROCHLORIDE 5 MG/1
5 TABLET ORAL EVERY 6 HOURS
Refills: 0 | Status: DISCONTINUED | OUTPATIENT
Start: 2024-02-12 | End: 2024-02-13

## 2024-02-12 RX ORDER — SODIUM CHLORIDE 9 MG/ML
1000 INJECTION, SOLUTION INTRAVENOUS
Refills: 0 | Status: DISCONTINUED | OUTPATIENT
Start: 2024-02-12 | End: 2024-02-12

## 2024-02-12 RX ADMIN — OXYCODONE HYDROCHLORIDE 5 MILLIGRAM(S): 5 TABLET ORAL at 14:00

## 2024-02-12 RX ADMIN — OXYCODONE HYDROCHLORIDE 5 MILLIGRAM(S): 5 TABLET ORAL at 14:45

## 2024-02-12 RX ADMIN — SODIUM CHLORIDE 125 MILLILITER(S): 9 INJECTION, SOLUTION INTRAVENOUS at 20:20

## 2024-02-12 RX ADMIN — CHLORHEXIDINE GLUCONATE 1 APPLICATION(S): 213 SOLUTION TOPICAL at 06:24

## 2024-02-12 RX ADMIN — SODIUM CHLORIDE 3 MILLILITER(S): 9 INJECTION INTRAMUSCULAR; INTRAVENOUS; SUBCUTANEOUS at 14:55

## 2024-02-12 RX ADMIN — HYDROMORPHONE HYDROCHLORIDE 0.5 MILLIGRAM(S): 2 INJECTION INTRAMUSCULAR; INTRAVENOUS; SUBCUTANEOUS at 13:05

## 2024-02-12 RX ADMIN — HEPARIN SODIUM 5000 UNIT(S): 5000 INJECTION INTRAVENOUS; SUBCUTANEOUS at 20:20

## 2024-02-12 RX ADMIN — FENTANYL CITRATE 50 MICROGRAM(S): 50 INJECTION INTRAVENOUS at 12:16

## 2024-02-12 RX ADMIN — Medication 400 MILLIGRAM(S): at 23:11

## 2024-02-12 RX ADMIN — SODIUM CHLORIDE 40 MILLILITER(S): 9 INJECTION, SOLUTION INTRAVENOUS at 13:09

## 2024-02-12 RX ADMIN — Medication 1000 MILLIGRAM(S): at 18:20

## 2024-02-12 RX ADMIN — HYDROMORPHONE HYDROCHLORIDE 0.5 MILLIGRAM(S): 2 INJECTION INTRAMUSCULAR; INTRAVENOUS; SUBCUTANEOUS at 13:15

## 2024-02-12 RX ADMIN — SODIUM CHLORIDE 3 MILLILITER(S): 9 INJECTION INTRAMUSCULAR; INTRAVENOUS; SUBCUTANEOUS at 21:24

## 2024-02-12 RX ADMIN — Medication 400 MILLIGRAM(S): at 17:48

## 2024-02-12 RX ADMIN — FENTANYL CITRATE 50 MICROGRAM(S): 50 INJECTION INTRAVENOUS at 12:45

## 2024-02-12 NOTE — CHART NOTE - NSCHARTNOTEFT_GEN_A_CORE
GYNECOLOGIC ONCOLOGY PA POST OP NOTE    Patient seen and examined at bedside resting comfortably with warm packs over abdominal binder. Pain well controlled. Patient denies headache, dizziness, nausea, vomiting, chest pain, palpitations and sob. Matute in situ. Patient is tolerating few sips of water.        OBJECTIVE:     VITALS:  T(F): 97.3 (02-12-24 @ 11:55), Max: 97.7 (02-12-24 @ 06:02)  HR: 68 (02-12-24 @ 14:04) (66 - 75)  BP: 124/69 (02-12-24 @ 14:00) (120/65 - 141/63)  RR: 15 (02-12-24 @ 14:04) (12 - 18)  SpO2: 97% (02-12-24 @ 14:04) (92% - 100%)  Wt(kg): --    I&O's Summary    11 Feb 2024 07:01  -  12 Feb 2024 07:00  --------------------------------------------------------  IN: 30 mL / OUT: 0 mL / NET: 30 mL    12 Feb 2024 07:01  -  12 Feb 2024 14:20  --------------------------------------------------------  IN: 40 mL / OUT: 0 mL / NET: 40 mL        MEDICATIONS  (STANDING):  acetaminophen   IVPB .. 1000 milliGRAM(s) IV Intermittent every 6 hours  lactated ringers. 1000 milliLiter(s) (40 mL/Hr) IV Continuous <Continuous>  lactated ringers. 1000 milliLiter(s) (30 mL/Hr) IV Continuous <Continuous>  sodium chloride 0.9% lock flush 3 milliLiter(s) IV Push every 8 hours    MEDICATIONS  (PRN):  aluminum hydroxide/magnesium hydroxide/simethicone Suspension 30 milliLiter(s) Oral every 4 hours PRN Dyspepsia  bisacodyl 5 milliGRAM(s) Oral daily PRN Constipation  HYDROmorphone  Injectable 0.5 milliGRAM(s) IV Push every 10 minutes PRN Severe Pain (7 - 10)  ondansetron Injectable 4 milliGRAM(s) IV Push once PRN Nausea and/or Vomiting  ondansetron Injectable 4 milliGRAM(s) IV Push every 8 hours PRN Nausea and/or Vomiting  oxyCODONE    IR 5 milliGRAM(s) Oral every 6 hours PRN Severe Pain (7 - 10)  senna 2 Tablet(s) Oral at bedtime PRN Constipation      Physical Exam:  Constitutional: NAD  Pulmonary: clear to auscultation bilaterally   Cardiovascular: Regular rate and rhythm   Abdomen: soft, non-distended, appraopriately tender, scope sites C/D/I  Extremities: no lower extremity edema or calve tenderness, bilat venodynes       Assesment/Plan:  80 yo female s/p Robot-assisted laparoscopic total hysterectomy with cystoscopy, Bilateral salpingoophorectomy and Lysis of pelvic adhesions in stable condition. See operative note for details.  -LR@100  -Regular diet  -Matuet overnight  -pain management prn  -Zofran prn  -Mylicon/Senna  -PT consult in am  -f/u 2pm CBC/BMP  -AM CBC/BMP  dispo: admit to 4T for routine post operative care    Anna Meeks PA-C  #[id number]

## 2024-02-12 NOTE — PATIENT PROFILE ADULT - PATIENT'S GENDER IDENTITY
Photo Preface (Leave Blank If You Do Not Want): Photographs were obtained today Detail Level: Zone Female

## 2024-02-12 NOTE — ASU PREOP CHECKLIST - 4.
Hysterectomy pink bundle sheet - filled out - in chart No - the patient is unable to be screened due to medical condition

## 2024-02-12 NOTE — BRIEF OPERATIVE NOTE - NSICDXBRIEFPROCEDURE_GEN_ALL_CORE_FT
PROCEDURES:  Robot-assisted laparoscopic total hysterectomy with cystoscopy 12-Feb-2024 12:12:01  Maria De Jesus Williamson  Bilateral salpingoophorectomy 12-Feb-2024 12:12:15  Maria De Jesus Williamson  Lysis of pelvic adhesions 12-Feb-2024 12:12:34  Maria De Jesus Williamson

## 2024-02-12 NOTE — PATIENT PROFILE ADULT - TRANSPORTATION
Add low dose amlodipine 2.5mg daily along with the Hyzaar you are on    Take medications as prescribed.    Monitor BP at home, twice a week in AM and PM, goal BP < or = 140/80, call office if consistently above this range.    Follow low salt DASH diet and exercise.    BMI reviewed.    Go to ED if Headaches, blurred vision, chest pain, or SOB occurs along with elevated readings > or = 160/90.    
no

## 2024-02-12 NOTE — BRIEF OPERATIVE NOTE - OPERATION/FINDINGS
EUA: Normal external female genitalia. Small mobile anteverted uterus, no adnexal masses palpated bilaterally.   LSC: Normal upper abdominal survey. Normal uterus, fallopian tubes, and ovaries. Adhesions throughout pelvis. Numerous diverticula on descending portion of colon.  Cysto: Bilateral ureteral jets visualized. Air bubble present at bladder dome. Grossly normal survey of bladder. EUA: Normal external female genitalia. Atrophic vagina. Small mobile anteverted uterus, no adnexal masses palpated bilaterally. Uterus sounded to 7cm.  LSC: Normal upper abdominal survey. Normal uterus, fallopian tubes, and ovaries. Adhesions throughout pelvis. Numerous diverticula on descending portion of colon.  Cysto: Bilateral ureteral jets visualized. Air bubble present at bladder dome. Grossly normal survey of bladder.

## 2024-02-12 NOTE — PATIENT PROFILE ADULT - HAS THE PATIENT RECEIVED THE INFLUENZA VACCINE THIS SEASON?
Encounter Date: 2/6/2018       History     Chief Complaint   Patient presents with    Vomiting     The history is provided by the patient.   GI Problem   The other symptoms of the illness include nausea and vomiting. The other symptoms of the illness do not include fever, jaundice, melena, diarrhea, dysuria, hematemesis or hematochezia.   Nausea began today. The nausea is associated with eating. The nausea is exacerbated by food.   The vomiting began today. Vomiting occurred once. The emesis contains stomach contents.   Additional symptoms associated with the illness include heartburn. Symptoms associated with the illness do not include chills, anorexia, diaphoresis, constipation, hematuria, frequency or back pain.     Review of patient's allergies indicates:  No Known Allergies  History reviewed. No pertinent past medical history.  History reviewed. No pertinent surgical history.  No family history on file.  Social History   Substance Use Topics    Smoking status: Never Smoker    Smokeless tobacco: Never Used    Alcohol use Not on file     Review of Systems   Constitutional: Negative for chills, diaphoresis and fever.   HENT: Negative for ear discharge, ear pain and facial swelling.    Eyes: Negative for pain and redness.   Respiratory: Negative for wheezing.    Cardiovascular: Negative for chest pain, palpitations and leg swelling.   Gastrointestinal: Positive for heartburn, nausea and vomiting. Negative for abdominal pain, anorexia, constipation, diarrhea, hematemesis, hematochezia, jaundice and melena.   Genitourinary: Negative for dysuria, frequency and hematuria.   Musculoskeletal: Negative for back pain and neck pain.   Skin: Negative for rash.   Neurological: Negative for weakness.       Physical Exam     Initial Vitals [02/06/18 0126]   BP Pulse Resp Temp SpO2   (!) 142/75 77 16 98.6 °F (37 °C) 100 %      MAP       97.33         Physical Exam    Nursing note and vitals reviewed.  Constitutional: He  appears well-developed and well-nourished. He is not diaphoretic. No distress.   HENT:   Head: Normocephalic and atraumatic.   Mouth/Throat: No oropharyngeal exudate.   Eyes: EOM are normal. Pupils are equal, round, and reactive to light.   Neck: Normal range of motion. Neck supple. No JVD present.   Pulmonary/Chest: Breath sounds normal. No stridor. No respiratory distress. He has no wheezes. He has no rhonchi. He has no rales.   Abdominal: Soft. Bowel sounds are normal. He exhibits no distension. There is no tenderness. There is no rebound and no guarding.   Musculoskeletal: Normal range of motion. He exhibits no edema or tenderness.   Neurological: He is alert and oriented to person, place, and time. No sensory deficit.   Skin: No rash noted.         ED Course   Procedures  Labs Reviewed - No data to display                            ED Course      Clinical Impression:   The primary encounter diagnosis was Gastroesophageal reflux disease, esophagitis presence not specified. A diagnosis of Vomiting was also pertinent to this visit.    Disposition:   Disposition: Discharged  Condition: Stable                        Mike Valentine MD  02/06/18 1384     no...

## 2024-02-12 NOTE — ASU PREOP CHECKLIST - TYPE OF SOLUTION
Patient is using shelf version of Sudafed.  Dosing was verified. She will take on a scheduled basis for the next 48 hours. She will follow up if no improvement in symptoms.   Lactated ringers

## 2024-02-12 NOTE — BRIEF OPERATIVE NOTE - SPECIMENS
Uterus, cervix, bilateral fallopian tubes and ovaries. Right sentinel pelvic lymph nodes. Left pelvic lymph node.

## 2024-02-12 NOTE — ASU PREOP CHECKLIST - SELECT TESTS ORDERED
FS/CBC/CMP/Type and Cross/Type and Screen/EKG/POCT Blood Glucose FS- 104 @ 6:10 am/CBC/CMP/Type and Cross/Type and Screen/EKG/POCT Blood Glucose

## 2024-02-12 NOTE — PATIENT PROFILE ADULT - FALL HARM RISK - HARM RISK INTERVENTIONS

## 2024-02-13 ENCOUNTER — TRANSCRIPTION ENCOUNTER (OUTPATIENT)
Age: 79
End: 2024-02-13

## 2024-02-13 VITALS
OXYGEN SATURATION: 95 % | SYSTOLIC BLOOD PRESSURE: 110 MMHG | HEART RATE: 63 BPM | TEMPERATURE: 98 F | RESPIRATION RATE: 16 BRPM | DIASTOLIC BLOOD PRESSURE: 65 MMHG

## 2024-02-13 DIAGNOSIS — Z98.890 OTHER SPECIFIED POSTPROCEDURAL STATES: ICD-10-CM

## 2024-02-13 LAB
ANION GAP SERPL CALC-SCNC: 13 MMOL/L — SIGNIFICANT CHANGE UP (ref 7–14)
APTT BLD: 35.3 SEC — SIGNIFICANT CHANGE UP (ref 24.5–35.6)
BASOPHILS # BLD AUTO: 0.01 K/UL — SIGNIFICANT CHANGE UP (ref 0–0.2)
BASOPHILS # BLD AUTO: 0.02 K/UL — SIGNIFICANT CHANGE UP (ref 0–0.2)
BASOPHILS NFR BLD AUTO: 0.1 % — SIGNIFICANT CHANGE UP (ref 0–2)
BASOPHILS NFR BLD AUTO: 0.1 % — SIGNIFICANT CHANGE UP (ref 0–2)
BUN SERPL-MCNC: 18 MG/DL — SIGNIFICANT CHANGE UP (ref 7–23)
CALCIUM SERPL-MCNC: 9.1 MG/DL — SIGNIFICANT CHANGE UP (ref 8.4–10.5)
CHLORIDE SERPL-SCNC: 106 MMOL/L — SIGNIFICANT CHANGE UP (ref 98–107)
CO2 SERPL-SCNC: 22 MMOL/L — SIGNIFICANT CHANGE UP (ref 22–31)
CREAT SERPL-MCNC: 0.97 MG/DL — SIGNIFICANT CHANGE UP (ref 0.5–1.3)
EGFR: 59 ML/MIN/1.73M2 — LOW
EOSINOPHIL # BLD AUTO: 0 K/UL — SIGNIFICANT CHANGE UP (ref 0–0.5)
EOSINOPHIL # BLD AUTO: 0 K/UL — SIGNIFICANT CHANGE UP (ref 0–0.5)
EOSINOPHIL NFR BLD AUTO: 0 % — SIGNIFICANT CHANGE UP (ref 0–6)
EOSINOPHIL NFR BLD AUTO: 0 % — SIGNIFICANT CHANGE UP (ref 0–6)
GLUCOSE BLDC GLUCOMTR-MCNC: 104 MG/DL — HIGH (ref 70–99)
GLUCOSE SERPL-MCNC: 105 MG/DL — HIGH (ref 70–99)
HCT VFR BLD CALC: 32.2 % — LOW (ref 34.5–45)
HCT VFR BLD CALC: 33 % — LOW (ref 34.5–45)
HGB BLD-MCNC: 10.4 G/DL — LOW (ref 11.5–15.5)
HGB BLD-MCNC: 10.6 G/DL — LOW (ref 11.5–15.5)
IANC: 10.26 K/UL — HIGH (ref 1.8–7.4)
IANC: 9.42 K/UL — HIGH (ref 1.8–7.4)
IMM GRANULOCYTES NFR BLD AUTO: 0.4 % — SIGNIFICANT CHANGE UP (ref 0–0.9)
IMM GRANULOCYTES NFR BLD AUTO: 0.6 % — SIGNIFICANT CHANGE UP (ref 0–0.9)
INR BLD: 1.01 RATIO — SIGNIFICANT CHANGE UP (ref 0.85–1.18)
LYMPHOCYTES # BLD AUTO: 19.2 % — SIGNIFICANT CHANGE UP (ref 13–44)
LYMPHOCYTES # BLD AUTO: 2.5 K/UL — SIGNIFICANT CHANGE UP (ref 1–3.3)
LYMPHOCYTES # BLD AUTO: 2.92 K/UL — SIGNIFICANT CHANGE UP (ref 1–3.3)
LYMPHOCYTES # BLD AUTO: 20.6 % — SIGNIFICANT CHANGE UP (ref 13–44)
MAGNESIUM SERPL-MCNC: 1.6 MG/DL — SIGNIFICANT CHANGE UP (ref 1.6–2.6)
MCHC RBC-ENTMCNC: 26.5 PG — LOW (ref 27–34)
MCHC RBC-ENTMCNC: 26.7 PG — LOW (ref 27–34)
MCHC RBC-ENTMCNC: 32.1 GM/DL — SIGNIFICANT CHANGE UP (ref 32–36)
MCHC RBC-ENTMCNC: 32.3 GM/DL — SIGNIFICANT CHANGE UP (ref 32–36)
MCV RBC AUTO: 82.5 FL — SIGNIFICANT CHANGE UP (ref 80–100)
MCV RBC AUTO: 82.6 FL — SIGNIFICANT CHANGE UP (ref 80–100)
MONOCYTES # BLD AUTO: 0.94 K/UL — HIGH (ref 0–0.9)
MONOCYTES # BLD AUTO: 1.03 K/UL — HIGH (ref 0–0.9)
MONOCYTES NFR BLD AUTO: 6.6 % — SIGNIFICANT CHANGE UP (ref 2–14)
MONOCYTES NFR BLD AUTO: 7.9 % — SIGNIFICANT CHANGE UP (ref 2–14)
NEUTROPHILS # BLD AUTO: 10.26 K/UL — HIGH (ref 1.8–7.4)
NEUTROPHILS # BLD AUTO: 9.42 K/UL — HIGH (ref 1.8–7.4)
NEUTROPHILS NFR BLD AUTO: 72.2 % — SIGNIFICANT CHANGE UP (ref 43–77)
NEUTROPHILS NFR BLD AUTO: 72.3 % — SIGNIFICANT CHANGE UP (ref 43–77)
NON-GYNECOLOGICAL CYTOLOGY STUDY: SIGNIFICANT CHANGE UP
NRBC # BLD: 0 /100 WBCS — SIGNIFICANT CHANGE UP (ref 0–0)
NRBC # BLD: 0 /100 WBCS — SIGNIFICANT CHANGE UP (ref 0–0)
NRBC # FLD: 0 K/UL — SIGNIFICANT CHANGE UP (ref 0–0)
NRBC # FLD: 0 K/UL — SIGNIFICANT CHANGE UP (ref 0–0)
PHOSPHATE SERPL-MCNC: 3.6 MG/DL — SIGNIFICANT CHANGE UP (ref 2.5–4.5)
PLATELET # BLD AUTO: 170 K/UL — SIGNIFICANT CHANGE UP (ref 150–400)
PLATELET # BLD AUTO: 175 K/UL — SIGNIFICANT CHANGE UP (ref 150–400)
POTASSIUM SERPL-MCNC: 4.6 MMOL/L — SIGNIFICANT CHANGE UP (ref 3.5–5.3)
POTASSIUM SERPL-SCNC: 4.6 MMOL/L — SIGNIFICANT CHANGE UP (ref 3.5–5.3)
PROTHROM AB SERPL-ACNC: 11.4 SEC — SIGNIFICANT CHANGE UP (ref 9.5–13)
RBC # BLD: 3.9 M/UL — SIGNIFICANT CHANGE UP (ref 3.8–5.2)
RBC # BLD: 4 M/UL — SIGNIFICANT CHANGE UP (ref 3.8–5.2)
RBC # FLD: 13 % — SIGNIFICANT CHANGE UP (ref 10.3–14.5)
RBC # FLD: 13.2 % — SIGNIFICANT CHANGE UP (ref 10.3–14.5)
SODIUM SERPL-SCNC: 141 MMOL/L — SIGNIFICANT CHANGE UP (ref 135–145)
WBC # BLD: 13.04 K/UL — HIGH (ref 3.8–10.5)
WBC # BLD: 14.2 K/UL — HIGH (ref 3.8–10.5)
WBC # FLD AUTO: 13.04 K/UL — HIGH (ref 3.8–10.5)
WBC # FLD AUTO: 14.2 K/UL — HIGH (ref 3.8–10.5)

## 2024-02-13 RX ORDER — ACETAMINOPHEN 500 MG
975 TABLET ORAL EVERY 6 HOURS
Refills: 0 | Status: DISCONTINUED | OUTPATIENT
Start: 2024-02-13 | End: 2024-02-13

## 2024-02-13 RX ORDER — OXYCODONE HYDROCHLORIDE 5 MG/1
1 TABLET ORAL
Qty: 6 | Refills: 0
Start: 2024-02-13

## 2024-02-13 RX ORDER — ACETAMINOPHEN 500 MG
3 TABLET ORAL
Qty: 0 | Refills: 0 | DISCHARGE
Start: 2024-02-13

## 2024-02-13 RX ORDER — MAGNESIUM OXIDE 400 MG ORAL TABLET 241.3 MG
400 TABLET ORAL ONCE
Refills: 0 | Status: COMPLETED | OUTPATIENT
Start: 2024-02-13 | End: 2024-02-13

## 2024-02-13 RX ORDER — SENNA PLUS 8.6 MG/1
2 TABLET ORAL
Qty: 0 | Refills: 0 | DISCHARGE
Start: 2024-02-13

## 2024-02-13 RX ADMIN — Medication 1000 MILLIGRAM(S): at 00:11

## 2024-02-13 RX ADMIN — Medication 975 MILLIGRAM(S): at 10:11

## 2024-02-13 RX ADMIN — Medication 975 MILLIGRAM(S): at 10:46

## 2024-02-13 RX ADMIN — MAGNESIUM OXIDE 400 MG ORAL TABLET 400 MILLIGRAM(S): 241.3 TABLET ORAL at 09:34

## 2024-02-13 RX ADMIN — OXYCODONE HYDROCHLORIDE 5 MILLIGRAM(S): 5 TABLET ORAL at 10:11

## 2024-02-13 RX ADMIN — Medication 1000 MILLIGRAM(S): at 06:07

## 2024-02-13 RX ADMIN — SODIUM CHLORIDE 125 MILLILITER(S): 9 INJECTION, SOLUTION INTRAVENOUS at 09:34

## 2024-02-13 RX ADMIN — OXYCODONE HYDROCHLORIDE 5 MILLIGRAM(S): 5 TABLET ORAL at 10:46

## 2024-02-13 RX ADMIN — Medication 400 MILLIGRAM(S): at 05:07

## 2024-02-13 RX ADMIN — SODIUM CHLORIDE 3 MILLILITER(S): 9 INJECTION INTRAMUSCULAR; INTRAVENOUS; SUBCUTANEOUS at 06:29

## 2024-02-13 RX ADMIN — SODIUM CHLORIDE 125 MILLILITER(S): 9 INJECTION, SOLUTION INTRAVENOUS at 05:07

## 2024-02-13 RX ADMIN — HEPARIN SODIUM 5000 UNIT(S): 5000 INJECTION INTRAVENOUS; SUBCUTANEOUS at 05:07

## 2024-02-13 NOTE — DISCHARGE NOTE PROVIDER - NSDCMRMEDTOKEN_GEN_ALL_CORE_FT
atorvastatin 10 mg oral tablet: 1 tab(s) orally once a day   acetaminophen 325 mg oral tablet: 3 tab(s) orally every 6 hours  atorvastatin 10 mg oral tablet: 1 tab(s) orally once a day  oxyCODONE 5 mg oral tablet: 1 tab(s) orally every 6 hours as needed for  severe pain MDD: 4 tabs  senna leaf extract oral tablet: 2 tab(s) orally once a day (at bedtime) As needed Constipation

## 2024-02-13 NOTE — DISCHARGE NOTE NURSING/CASE MANAGEMENT/SOCIAL WORK - NSDCPEFALRISK_GEN_ALL_CORE
For information on Fall & Injury Prevention, visit: [email or web address] OR  [email or web address] OR  https://www.cdc.gov/steadi/patient.html

## 2024-02-13 NOTE — DISCHARGE NOTE NURSING/CASE MANAGEMENT/SOCIAL WORK - PATIENT PORTAL LINK FT
You can access the FollowMyHealth Patient Portal offered by [facility] by registering at the following website: [email or web address]. By joining [facility]’s FollowMyHealth portal, you will also be able to view your health information using other applications (apps) compatible with our system.

## 2024-02-13 NOTE — PHYSICAL THERAPY INITIAL EVALUATION ADULT - LIVES WITH, PROFILE
Pt will be staying with daughter upon discharge and will not have to negotiate stairs;  Pt lives alone in private home w/ flight of stairs to bedroom

## 2024-02-13 NOTE — PROGRESS NOTE ADULT - PROBLEM SELECTOR PLAN 1
Gyn Onc Fellow Addendum:    Pt seen and examined at bedside. Agree with above.    VS reviewed  Labs reviewed    Hemodynamically stable, H/H this AM with greater than expected drop, stable on repeat, coags wnl, no concern for ongoing  bleeding   Continue current pain regimen  Reg diet   Matute out, f/u trial of void   Encourage ambulation and IS use  Replete lytes prn  DVT ppx: Venodynes  Dispo: anticipate discharge home     Fadumo Ray MD

## 2024-02-13 NOTE — DISCHARGE NOTE PROVIDER - NSDCACTIVITY_GEN_ALL_CORE
Showering allowed/Stairs allowed/Walking - Indoors allowed/No heavy lifting/straining/Walking - Outdoors allowed Showering allowed/Stairs allowed/Walking - Indoors allowed/No heavy lifting/straining/Walking - Outdoors allowed/Follow Instructions Provided by your Surgical Team

## 2024-02-13 NOTE — DISCHARGE NOTE PROVIDER - HOSPITAL COURSE
Patient underwent an uncomplicated RA TLH, BSO, JORDANA, cystoscopy for endometrial adenocarcinoma, . Please see operative report for more details.    On POD#0: patient recovered in PACU and was transferred to floor without issues.  On POD#1: no acute events overnight. Patient advanced to regular diet, PO pain medications. Matute was removed and patient voided spontaneously.    On day of discharge on POD#__, patient meeting all criteria for discharge to home: pain well controlled with PO medications, tolerating regular diet without nausea/vomiting, voiding spontaneously, ambulating without issue. Patient will have close outpatient follow up with Dr. Rivers as scheduled on 3/5/2024 at 11am. Patient underwent an uncomplicated RA TLH, BSO, JORDANA, cystoscopy for endometrial adenocarcinoma, . Please see operative report for more details.    On POD#0: patient recovered in PACU and was transferred to floor without issues.  On POD#1: no acute events overnight. Patient advanced to regular diet, PO pain medications. Matute was removed and patient voided spontaneously. Patient was evaluated by Physical Therapy: recommended no needs; patient is ambulating without difficulty.     On day of discharge on POD#1, patient meeting all criteria for discharge to home: pain well controlled with PO medications, tolerating regular diet without nausea/vomiting, voiding spontaneously, ambulating without issue. Patient will have close outpatient follow up with Dr. Rivers as scheduled on 3/5/2024 at 11am.               10.4   14.20 )-----------( 170      ( 02-13 @ 09:14 )             32.2                10.6   13.04 )-----------( 175      ( 02-13 @ 05:40 )             33.0                13.4   19.16 )-----------( 187      ( 02-12 @ 13:57 )             42.3      Patient underwent an uncomplicated RA TLH, BSO, JORDANA, cystoscopy for endometrial adenocarcinoma, . Please see operative report for more details.    On POD#0: patient recovered in PACU and was transferred to floor without issues.  On POD#1: no acute events overnight. Patient advanced to regular diet, PO pain medications. Matute was removed and patient voided spontaneously. Patient was evaluated by Physical Therapy: recommended no needs; patient is ambulating without difficulty.     On day of discharge on POD#1, patient meeting all criteria for discharge to home: pain well controlled with PO medications, tolerating regular diet without nausea/vomiting, voiding spontaneously, ambulating without issue. Patient will have close outpatient follow up with Dr. Rivers as scheduled on 3/5/2024 at 11am.               10.4   14.20 )-----------( 170      ( 02-13 @ 09:14 )             32.2                10.6   13.04 )-----------( 175      ( 02-13 @ 05:40 )             33.0                13.4   19.16 )-----------( 187      ( 02-12 @ 13:57 )             42.3

## 2024-02-13 NOTE — DISCHARGE NOTE PROVIDER - NSDCCPCAREPLAN_GEN_ALL_CORE_FT
PRINCIPAL DISCHARGE DIAGNOSIS  Diagnosis: Endometrial adenocarcinoma  Assessment and Plan of Treatment:

## 2024-02-13 NOTE — DISCHARGE NOTE PROVIDER - NSDCQMPCI_CARD_ALL_CORE
Note needs to be addended for why the patient needs a seat on her rollator.
Received fax from [facility] that pts OV notes need to be addended for her rollator walker    Notes need to mention need for the seat       Fax attached
What information needss to be added to note?
No

## 2024-02-13 NOTE — DISCHARGE NOTE PROVIDER - CARE PROVIDER_API CALL
Hue Francis  Gynecologic Oncology  [address] [place] [id number]  Phone: [phone]  Fax: [phone]  Scheduled Appointment: 03/05/2024 11:00 AM

## 2024-02-13 NOTE — DISCHARGE NOTE PROVIDER - NSDCCPTREATMENT_GEN_ALL_CORE_FT
PRINCIPAL PROCEDURE  Procedure: Robot-assisted laparoscopic total hysterectomy with cystoscopy  Findings and Treatment:       SECONDARY PROCEDURE  Procedure: Lysis of pelvic adhesions  Findings and Treatment:     Procedure: Bilateral salpingoophorectomy  Findings and Treatment:

## 2024-02-13 NOTE — PROGRESS NOTE ADULT - ASSESSMENT
A/P: 79y POD#1 s/p RA-TLH, BSO, JORDANA, cystoscopy. Pt stable and meeting post-operative milestones.  Neuro: pain well controlled on current regimen   CV: hemodynamically stable, f/u AM CBC  Pulm: O2 sat WNL on RA, increase ambulation, encourage incentive spirometry use  GI: tolerating regular diet  : UOP adequate, d/c goodman  vs. voiding spontaneously   Heme: lovenox and SCDs while in bed for DVT ppx  ID: afebrile, no signs of infection  Endo: No active issues  Fe: Replete electrolytes as needed, LR@....... f/u AM BMP  Dispo: continue routine post-op care    Seen and evaluated with Gyn Onc team.  Maria De Jesus Williamson, PGY-1 A/P: 79y POD#1 s/p RA-TLH, BSO, JORDANA, cystoscopy. Pt stable and meeting post-operative milestones.    Neuro: pain well controlled on IV Tylenol and Oxycodone PRN; plan to transition to PO regimen today  CV: Hemodynamically stable, f/u AM CBC  - HLD: holding home atorvastatin  Pulm: O2 sat WNL on RA, increase ambulation, encourage incentive spirometry use  GI: Tolerating clear liquid diet, advance to regular diet  : UOP adequate, d/c goodman today AM pending stable labs  Heme: HSQ and SCDs while in bed for DVT ppx  ID: afebrile, no signs of infection  Endo: No active issues  Fe: Replete electrolytes as needed, LR@125, f/u AM BMP  Dispo: anticipate discharge today    Seen and evaluated with Gyn Onc team.  Maria De Jesus Williamson, PGY-1

## 2024-02-13 NOTE — DISCHARGE NOTE PROVIDER - NSDCFUADDINST_GEN_ALL_CORE_FT
1. Return to your regular way of eating. Resume normal activity as tolerated, however No heavy lifting or strenuous activity for 6 weeks.  No driving for next 2 weeks and/or while on narcotic pain medication.  Complete vaginal rest, no tampons, no douching, no tub bathing, no sexual activities for 6 weeks unless otherwise instructed by your doctor. Call your doctor with any signs and symptoms of infection such as fever, chills, nausea or vomiting.  Call your doctor with redness or swelling at the incision site, fluid leakage or wound separation.  Call your doctor if you're unable to tolerate food or have difficulty urinating.  Call your doctor if you have pain that is not relieved by your prescribed medications.  Notify your doctor with any other concerns.  2. Please take Tylenol 975 mg as needed every 6 hours for mild to moderate pain management  3. Please take Oxycodone 5mg as needed every 6 hours for severe pain.

## 2024-02-13 NOTE — PROGRESS NOTE ADULT - ATTENDING COMMENTS
Patient seen and examined, agree with gyn onc fellow and resident  POD#1  Doing well  Routine postop care  Exam benign

## 2024-02-13 NOTE — PROGRESS NOTE ADULT - SUBJECTIVE AND OBJECTIVE BOX
Gyn ONC Progress Note POD #1/HD#2    Subjective:   Patient seen and examined at bedside. No acute events overnight. No acute complaints.  Pain well controlled.  Patient is ambulating and tolerating clear liquid diet. Has not yet passed flatus vs. Patient is passing flatus. Goodman is still in place.   Denies lightheadedness, dizziness, CP, SOB, N/V, fevers, and chills.    Objective:  T(F): 97.7 (02-13-24 @ 05:02), Max: 98.4 (02-12-24 @ 21:32)  HR: 67 (02-13-24 @ 05:02) (66 - 78)  BP: 104/50 (02-13-24 @ 05:02) (104/50 - 141/63)  RR: 17 (02-13-24 @ 05:02) (12 - 20)  SpO2: 95% (02-13-24 @ 05:02) (92% - 100%)  Wt(kg): --  I&O's Summary    11 Feb 2024 07:01  -  12 Feb 2024 07:00  --------------------------------------------------------  IN: 30 mL / OUT: 0 mL / NET: 30 mL    12 Feb 2024 07:01  -  13 Feb 2024 06:16  --------------------------------------------------------  IN: 1540 mL / OUT: 1150 mL / NET: 390 mL      CAPILLARY BLOOD GLUCOSE          MEDICATIONS  (STANDING):  acetaminophen   IVPB .. 1000 milliGRAM(s) IV Intermittent every 6 hours  heparin   Injectable 5000 Unit(s) SubCutaneous every 8 hours  lactated ringers. 1000 milliLiter(s) (125 mL/Hr) IV Continuous <Continuous>  magnesium sulfate  IVPB 2 Gram(s) IV Intermittent once  sodium chloride 0.9% lock flush 3 milliLiter(s) IV Push every 8 hours    MEDICATIONS  (PRN):  aluminum hydroxide/magnesium hydroxide/simethicone Suspension 30 milliLiter(s) Oral every 4 hours PRN Dyspepsia  bisacodyl 5 milliGRAM(s) Oral daily PRN Constipation  HYDROmorphone  Injectable 0.5 milliGRAM(s) IV Push every 10 minutes PRN Severe Pain (7 - 10)  ondansetron Injectable 4 milliGRAM(s) IV Push every 8 hours PRN Nausea and/or Vomiting  oxyCODONE    IR 5 milliGRAM(s) Oral every 6 hours PRN Severe Pain (7 - 10)  senna 2 Tablet(s) Oral at bedtime PRN Constipation      Physical Exam:  Constitutional: NAD, A+O x3  CV: RRR  Lungs: clear to auscultation bilaterally  Abdomen: soft, nondistended, no guarding, no rebound, normal bowel sounds  Incision: 5 LSC sites, clean, dry, intact  Extremities: no lower extremity edema or calf tenderness bilaterally; venodynes in place    LABS:    02-12    142    |  107    |  20     ----------------------------<  149<H>  4.1     |  23     |  0.95     Ca    9.4        12 Feb 2024 13:57  Phos  3.6       02-12  Mg     1.80      02-12            Urinalysis Basic - ( 12 Feb 2024 13:57 )    Color: x / Appearance: x / SG: x / pH: x  Gluc: 149 mg/dL / Ketone: x  / Bili: x / Urobili: x   Blood: x / Protein: x / Nitrite: x   Leuk Esterase: x / RBC: x / WBC x   Sq Epi: x / Non Sq Epi: x / Bacteria: x          A/P: 79y  HD#/POD# s/p admitted with . Pt stable.   Neuro: pain well controlled on current regimen   CV: hemodynamically stable, H&H stable on AM labs  Pulm: O2 sat WNL on RA, increase ambulation, encourage incentive spirometry use  GI: tolerating regular diet  : UOP adequate, d/c goodman  vs. voiding spontaneously   Heme: lovenox and SCDs while in bed for DVT ppx  ID: afebrile, no signs of infection  Endo: No active issues  Fe: Replete electrolytes as needed, LR@......  Dispo: continue routine post-op care      Malou Reyes MD, PGY1       Gyn ONC Progress Note POD #1/HD#2    Subjective:   Patient seen and examined at bedside. No acute events overnight. No acute complaints. Pain well controlled. Patient is ambulating and tolerating clear liquid diet. Has not yet passed flatus. Matute is still in place.   Denies lightheadedness, dizziness, CP, SOB, N/V, fevers, and chills.    Objective:  T(F): 97.7 (02-13-24 @ 05:02), Max: 98.4 (02-12-24 @ 21:32)  HR: 67 (02-13-24 @ 05:02) (66 - 78)  BP: 104/50 (02-13-24 @ 05:02) (104/50 - 141/63)  RR: 17 (02-13-24 @ 05:02) (12 - 20)  SpO2: 95% (02-13-24 @ 05:02) (92% - 100%)  Wt(kg): --  I&O's Summary    11 Feb 2024 07:01  -  12 Feb 2024 07:00  --------------------------------------------------------  IN: 30 mL / OUT: 0 mL / NET: 30 mL    12 Feb 2024 07:01  -  13 Feb 2024 06:16  --------------------------------------------------------  IN: 1540 mL / OUT: 1150 mL / NET: 390 mL      CAPILLARY BLOOD GLUCOSE          MEDICATIONS  (STANDING):  acetaminophen   IVPB .. 1000 milliGRAM(s) IV Intermittent every 6 hours  heparin   Injectable 5000 Unit(s) SubCutaneous every 8 hours  lactated ringers. 1000 milliLiter(s) (125 mL/Hr) IV Continuous <Continuous>  magnesium sulfate  IVPB 2 Gram(s) IV Intermittent once  sodium chloride 0.9% lock flush 3 milliLiter(s) IV Push every 8 hours    MEDICATIONS  (PRN):  aluminum hydroxide/magnesium hydroxide/simethicone Suspension 30 milliLiter(s) Oral every 4 hours PRN Dyspepsia  bisacodyl 5 milliGRAM(s) Oral daily PRN Constipation  HYDROmorphone  Injectable 0.5 milliGRAM(s) IV Push every 10 minutes PRN Severe Pain (7 - 10)  ondansetron Injectable 4 milliGRAM(s) IV Push every 8 hours PRN Nausea and/or Vomiting  oxyCODONE    IR 5 milliGRAM(s) Oral every 6 hours PRN Severe Pain (7 - 10)  senna 2 Tablet(s) Oral at bedtime PRN Constipation      Physical Exam:  Constitutional: NAD, A+O x3  CV: RRR  Lungs: clear to auscultation bilaterally  Abdomen: soft, appropriately tender to palpation, nondistended, no guarding, no rebound, normal bowel sounds  Incision: 5 LSC sites, clean, dry, intact with dressing in place  Extremities: no lower extremity edema or calf tenderness bilaterally; venodynes in place    LABS:    02-12    142    |  107    |  20     ----------------------------<  149<H>  4.1     |  23     |  0.95     Ca    9.4        12 Feb 2024 13:57  Phos  3.6       02-12  Mg     1.80      02-12            Urinalysis Basic - ( 12 Feb 2024 13:57 )    Color: x / Appearance: x / SG: x / pH: x  Gluc: 149 mg/dL / Ketone: x  / Bili: x / Urobili: x   Blood: x / Protein: x / Nitrite: x   Leuk Esterase: x / RBC: x / WBC x   Sq Epi: x / Non Sq Epi: x / Bacteria: x

## 2024-02-13 NOTE — DISCHARGE NOTE NURSING/CASE MANAGEMENT/SOCIAL WORK - NSDCPNINST_GEN_ALL_CORE
Maintain incision and dressing clean dry and intact. Call MD with any signs of infection (i.e.: fever, redness, swellinf, drainage), or with signs of bleeding, unrelieved increased pain, or persistent nausea. No driving while narcotic medication, it causes drowsiness and constipation. Continue to drink plenty of fluids to promote hydration. No heavy lifting, pulling, or pushing heavy objects. Teach back utilized in education process. Safety and fall prevention measures reinforced.

## 2024-02-14 ENCOUNTER — NON-APPOINTMENT (OUTPATIENT)
Age: 79
End: 2024-02-14

## 2024-02-26 LAB — SURGICAL PATHOLOGY STUDY: SIGNIFICANT CHANGE UP

## 2024-03-04 PROBLEM — Z48.89 POSTOPERATIVE VISIT: Status: ACTIVE | Noted: 2024-03-04

## 2024-03-05 ENCOUNTER — APPOINTMENT (OUTPATIENT)
Dept: GYNECOLOGIC ONCOLOGY | Facility: CLINIC | Age: 79
End: 2024-03-05
Payer: MEDICARE

## 2024-03-05 VITALS
BODY MASS INDEX: 24.92 KG/M2 | SYSTOLIC BLOOD PRESSURE: 128 MMHG | DIASTOLIC BLOOD PRESSURE: 60 MMHG | WEIGHT: 146 LBS | TEMPERATURE: 98 F | HEIGHT: 64 IN | HEART RATE: 70 BPM

## 2024-03-05 DIAGNOSIS — Z48.89 ENCOUNTER FOR OTHER SPECIFIED SURGICAL AFTERCARE: ICD-10-CM

## 2024-03-05 PROBLEM — M47.816 SPONDYLOSIS WITHOUT MYELOPATHY OR RADICULOPATHY, LUMBAR REGION: Chronic | Status: ACTIVE | Noted: 2024-02-06

## 2024-03-05 PROBLEM — M85.80 OTHER SPECIFIED DISORDERS OF BONE DENSITY AND STRUCTURE, UNSPECIFIED SITE: Chronic | Status: ACTIVE | Noted: 2024-02-06

## 2024-03-05 PROBLEM — C54.1 MALIGNANT NEOPLASM OF ENDOMETRIUM: Chronic | Status: ACTIVE | Noted: 2024-02-06

## 2024-03-05 PROBLEM — N95.0 POSTMENOPAUSAL BLEEDING: Chronic | Status: ACTIVE | Noted: 2024-02-06

## 2024-03-05 PROBLEM — R31.9 HEMATURIA, UNSPECIFIED: Chronic | Status: ACTIVE | Noted: 2024-02-06

## 2024-03-05 PROCEDURE — 99024 POSTOP FOLLOW-UP VISIT: CPT

## 2024-03-05 NOTE — DISCUSSION/SUMMARY
[Clean] : was clean [Dry] : was dry [Normal Skin] : normal appearance [None] : had no drainage [Normal Skin Turgor] : normal skin turgor [Doing Well] : is doing well [Excellent Pain Control] : has excellent pain control [No Sign of Infection] : is showing no signs of infection [Erythema] : was not erythematous [Ecchymosis] : was not ecchymotic [Firm] : soft [Tender] : nontender [Abnormal Bowel Sounds] : normal bowel sounds [Rebound] : no rebound tenderness [Guarding] : no guarding [Vaginal Exam Abnormal] : normal vaginal exam [de-identified] : intact, well healed

## 2024-03-05 NOTE — ASSESSMENT
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 - Doing well, meeting all postoperative milestones. - Postoperative recovery and expectations reviewed again in detail. - Final pathology report reviewed in detail. - Discussed recommendation for surveillance given she doesn't meet high intermediate risk criteria. Case to be reviewed at  tomorrow and if any changes I will make her aware. - Education provided on signs/symptoms of recurrence and when to seek follow up sooner than our scheduled surveillance. She expressed verbal understanding. - Plan for close interval follow up in 3 months or sooner as clinically indicated.  - I spent the time noted on the day of this patient encounter preparing for, providing and documenting the above service. I have counseled and educated the patient on the differential, workup, disease course, and treatment/management plan. Education was provided to the patient during this encounter. All questions and concerns were answered and addressed in detail.

## 2024-03-05 NOTE — LETTER BODY
[I recently saw our patient [unfilled] for a follow-up visit.] : I recently saw our patient, [unfilled] for a follow-up visit. [Attached please find my note.] : Attached please find my note. [Dear  ___] : Dear  [unfilled], [FreeTextEntry1] : Final pathology [FreeTextEntry2] : Patient is status post Robotic-assisted total laparoscopic hysterectomy, bilateral salpingo-oophorectomy, lysis of adhesions, bilateral ureterolysis, bilateral sentinel  lymph node mapping and dissection, cystoscopy, and bilateral transversus abdominus plain block on 2/12/2024.

## 2024-03-05 NOTE — REASON FOR VISIT
[Post Op] : post op visit [de-identified] : 2/12/2024 [de-identified] : Robotic-assisted total laparoscopic hysterectomy, bilateral salpingo-oophorectomy, lysis of adhesions, bilateral ureterolysis, bilateral sentinel  lymph node mapping and dissection, cystoscopy, and bilateral transversus abdominus plain block. [de-identified] : She denies fevers, chills, night sweats, chest pain, SOB, changes in appetite, nausea, vomiting, increased abdominal girth, unintentional weight loss, abdominopelvic pain, lower extremity edema or pain and changes in bowel/bladder movements. Denies vaginal bleeding and abnormal vaginal discharge. Final pathology reviewed in detail.   Final pathology: 1. Uterus,cervix, bilateral fallopian tubes and ovaries, total hysterectomy and bilateral salpingo-oophorectomy - Endometrioid carcinoma, FIGO grade 1, see synoptic summary - Myometrial invasion identified (1 mm out of 12 mm, 8%) - Cervix, negative for carcinoma - Fallopian tubes and ovaries, negative for carcinoma Note: The carcinoma shows microcystic elongated and fragmented (MELF) pattern of invasion. As per prior biopsy (16-MV-) the tumor shows wild type p53 expression and loss of MLH1 and PMS2 nuclear expression with MLH1 promoter methylation.  2. Lymph node, left pelvic sentinel, excision - One lymph node, negative for carcinoma Note: Immunohistochemical stain for AE1/AE3 supports the above diagnosis.  3. Lymph node, right pelvic sentinel, excision - One lymph node, negative for carcinoma Note: Immunohistochemical stain for AE1/AE3 supports the above diagnosis.

## 2024-03-11 ENCOUNTER — NON-APPOINTMENT (OUTPATIENT)
Age: 79
End: 2024-03-11

## 2024-04-11 ENCOUNTER — RESULT REVIEW (OUTPATIENT)
Age: 79
End: 2024-04-11

## 2024-04-11 ENCOUNTER — APPOINTMENT (OUTPATIENT)
Dept: RADIOLOGY | Facility: CLINIC | Age: 79
End: 2024-04-11
Payer: MEDICARE

## 2024-04-11 ENCOUNTER — APPOINTMENT (OUTPATIENT)
Dept: MAMMOGRAPHY | Facility: CLINIC | Age: 79
End: 2024-04-11
Payer: MEDICARE

## 2024-04-11 PROCEDURE — 77080 DXA BONE DENSITY AXIAL: CPT

## 2024-04-11 PROCEDURE — 77067 SCR MAMMO BI INCL CAD: CPT

## 2024-04-11 PROCEDURE — 77063 BREAST TOMOSYNTHESIS BI: CPT

## 2024-04-24 ENCOUNTER — RX RENEWAL (OUTPATIENT)
Age: 79
End: 2024-04-24

## 2024-06-18 ENCOUNTER — APPOINTMENT (OUTPATIENT)
Dept: GYNECOLOGIC ONCOLOGY | Facility: CLINIC | Age: 79
End: 2024-06-18
Payer: MEDICARE

## 2024-06-18 ENCOUNTER — NON-APPOINTMENT (OUTPATIENT)
Age: 79
End: 2024-06-18

## 2024-06-18 VITALS
HEART RATE: 60 BPM | HEIGHT: 64 IN | SYSTOLIC BLOOD PRESSURE: 127 MMHG | OXYGEN SATURATION: 99 % | DIASTOLIC BLOOD PRESSURE: 83 MMHG | RESPIRATION RATE: 16 BRPM | BODY MASS INDEX: 24.92 KG/M2 | WEIGHT: 146 LBS | TEMPERATURE: 98 F

## 2024-06-18 PROCEDURE — G2211 COMPLEX E/M VISIT ADD ON: CPT

## 2024-06-18 PROCEDURE — 99459 PELVIC EXAMINATION: CPT

## 2024-06-18 PROCEDURE — 99213 OFFICE O/P EST LOW 20 MIN: CPT

## 2024-06-19 NOTE — HISTORY OF PRESENT ILLNESS
[FreeTextEntry1] : GYN/Ref: Kermit Murray MD PCP:  Dr. Angelita Bermudez Urology: Dr. Peggy Edmond  Ms. Hernández, 78 years old, referred for endometrial cancer.  IHC shows loss of nuclear expression and MLH1 and PMS2.  Methylation testing is pending.  Patient initially had spotting on her toilet paper after urinating.  She went to urgent care and urine culture returned negative.  She was advised to follow-up with urology.  Urology also sent urine culture, cytology which were both negative.  A CT urogram was completed and identified a thickened endometrium of 26 mm.  She was referred then to see GYN.  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  She is referred for further management.  On 24 the patient underwent a Robotic-assisted total laparoscopic hysterectomy, bilateral salpingo-oophorectomy, lysis of adhesions, bilateral ureterolysis, bilateral sentinel lymph node mapping and dissection, cystoscopy, and bilateral transversus abdominus plain block. and final pathology revealed  stage IA grade 1 endometrioid endometrial carcinoma. She recovered well postoperatlvely and was dispositioned to surveillance. She presents today for follow up. She denies fevers, chills, night sweats, chest pain, SOB, changes in appetite, nausea, vomiting, increased abdominal girth, unintentional weight loss, abdominopelvic pain, lower extremity edema or pain and changes in bowel/bladder movements. Denies vaginal bleeding and abnormal vaginal discharge.   Final pathology: 2024 ([facility]): Final pathology: 1. Uterus,cervix, bilateral fallopian tubes and ovaries, total hysterectomy and bilateral salpingo-oophorectomy - Endometrioid carcinoma, FIGO grade 1, see synoptic summary - Myometrial invasion identified (1 mm out of 12 mm, 8%) - Cervix, negative for carcinoma - Fallopian tubes and ovaries, negative for carcinoma Note: The carcinoma shows microcystic elongated and fragmented (MELF) pattern of invasion. As per prior biopsy (19-VZ-) the tumor shows wild type p53 expression and loss of MLH1 and PMS2 nuclear expression with MLH1 promoter methylation.  2. Lymph node, left pelvic sentinel, excision - One lymph node, negative for carcinoma Note: Immunohistochemical stain for AE1/AE3 supports the above diagnosis.  3. Lymph node, right pelvic sentinel, excision - One lymph node, negative for carcinoma Note: Immunohistochemical stain for AE1/AE3 supports the above diagnosis   1/10/2024 EMB ([facility]) Endometrium; biopsy:      - Endometrial carcinoma with mucinous features, FIGO grade 1.  MLH1 : Loss of nuclear expression MSH2 :   Intact nuclear expression MSH6:   Intact nuclear expression PMS2 : Loss of nuclear expression Interpretation:  There is a loss of nuclear expression in MLH1 and PMS2, while MSH2 and MSH6 show intact nuclear expression   . 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     MLH1 Promoter Methylation   testing is pending.  Imagin2024: CT urogram A/P ([facility]) LOWER CHEST: Within normal limits. LIVER: A subcentimeter hypodense focus in the right hepatic lobe, too small to characterize and likely a cyst. BILE DUCTS: Normal caliber. GALLBLADDER: Within normal limits. SPLEEN: Within normal limits. PANCREAS: Within normal limits. ADRENALS: Within normal limits. KIDNEYS/URETERS: No renal stones or hydronephrosis. Symmetric renal enhancement. No renal mass or evidence of a urothelial lesion. BLADDER: Within normal limits. REPRODUCTIVE ORGANS: Abnormally thickened endometrium measuring up to 2.6 cm in thickness concerning for an endometrial neoplasm. BOWEL: Small hiatal hernia. No bowel obstruction. Appendix is normal. PERITONEUM: No ascites. VESSELS: Atherosclerotic changes. RETROPERITONEUM/LYMPH NODES: No lymphadenopathy. ABDOMINAL WALL: Within normal limits. BONES: Degenerative changes. IMPRESSION: Markedly thickened, abnormal endometrium concerning for an endometrial neoplasm. No renal stones, renal masses or evidence of a urothelial lesion.  POB: G1, P1 (vaginal delivery, daughter age 57) PGYN: Menarche age 14 LMP 50s PMH: Hyperlipidemia Meds: Atorvastatin, vitamin D, Osteo Bi-Flex Allergies: NKDA PSH: None Social Hx: Lives alone, non-smoker, occasional alcohol, no illicit drugs FH: Maternal grandfather-stomach cancer-age 70  Health Maintenance: Mammogram:  -no issues with breast health.  Failed to follow-up due to COVID pandemic and chronic back pain Colonoscopy: 2022.  Next due . PAP: 1/10/2024: Negative for intraepithelial lesion or malignancy.  - HPV mRNA ([facility])

## 2024-06-19 NOTE — ASSESSMENT
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

## 2024-06-19 NOTE — PHYSICAL EXAM
[Chaperone Present] : A chaperone was present in the examining room during all aspects of the physical examination [24949] : A chaperone was present during the pelvic exam. [Absent] : Adnexa(ae): Absent [Normal] : Recto-Vaginal Exam: Normal [FreeTextEntry2] : Nancy GOLDMAN

## 2024-06-19 NOTE — DISCUSSION/SUMMARY
[Reviewed Clinical Lab Test(s)] : Results of clinical tests were reviewed. [Discuss Tests w/Referring Providers] : Results of labs/radiology studies and the treatment recommendations were discussed with performing/referring physician. [Discuss Alternatives/Risks/Benefits w/Patient] : All alternatives, risks, and benefits were discussed with the patient/family and all questions were answered.  Patient expressed good understanding and appreciates the importance of follow up as recommended. [Visit Time ___ Minutes] : [unfilled] minutes [Face to Face Time___ Minutes] : with [unfilled] minutes in face to face consultation. [FreeTextEntry1] : - Patients history and work up to date reviewed in detail. - Doing well, clinically AMADO. - Mammo up to date according to health care maintenance, will order next due at her upcoming surviellance visit.  -Final pathology report reviewed again in detail. - Discussed recommendation for surveillance given she doesn't meet high intermediate risk criteria.   - Education provided on signs/symptoms of recurrence and when to seek follow up sooner than our scheduled surveillance. She expressed verbal understanding. - Plan for close interval follow up in 3 months or sooner as clinically indicated. - I spent the time noted on the day of this patient encounter preparing for, providing and documenting the above service. I have counseled and educated the patient on the differential, workup, disease course, and treatment/management plan. Education was provided to the patient during this encounter. All questions and concerns were answered and addressed in detail.

## 2024-07-23 ENCOUNTER — APPOINTMENT (OUTPATIENT)
Dept: INTERNAL MEDICINE | Facility: CLINIC | Age: 79
End: 2024-07-23
Payer: MEDICARE

## 2024-07-23 LAB
ALBUMIN SERPL ELPH-MCNC: 4.7 G/DL
ALP BLD-CCNC: 76 U/L
ALT SERPL-CCNC: 13 U/L
ANION GAP SERPL CALC-SCNC: 14 MMOL/L
AST SERPL-CCNC: 19 U/L
BASOPHILS # BLD AUTO: 0.03 K/UL
BASOPHILS NFR BLD AUTO: 0.3 %
BILIRUB SERPL-MCNC: 0.4 MG/DL
BUN SERPL-MCNC: 16 MG/DL
CALCIUM SERPL-MCNC: 10.6 MG/DL
CHLORIDE SERPL-SCNC: 106 MMOL/L
CHOLEST SERPL-MCNC: 184 MG/DL
CO2 SERPL-SCNC: 22 MMOL/L
CREAT SERPL-MCNC: 1.13 MG/DL
EGFR: 49 ML/MIN/1.73M2
EOSINOPHIL # BLD AUTO: 0.18 K/UL
EOSINOPHIL NFR BLD AUTO: 1.8 %
GLUCOSE SERPL-MCNC: 109 MG/DL
HCT VFR BLD CALC: 40.4 %
HDLC SERPL-MCNC: 66 MG/DL
HGB BLD-MCNC: 12.9 G/DL
IMM GRANULOCYTES NFR BLD AUTO: 0.5 %
LDLC SERPL CALC-MCNC: 89 MG/DL
LYMPHOCYTES # BLD AUTO: 3.29 K/UL
LYMPHOCYTES NFR BLD AUTO: 32.6 %
MAN DIFF?: NORMAL
MCHC RBC-ENTMCNC: 26.6 PG
MCHC RBC-ENTMCNC: 31.9 GM/DL
MCV RBC AUTO: 83.3 FL
MONOCYTES # BLD AUTO: 0.7 K/UL
MONOCYTES NFR BLD AUTO: 6.9 %
NEUTROPHILS # BLD AUTO: 5.84 K/UL
NEUTROPHILS NFR BLD AUTO: 57.9 %
NONHDLC SERPL-MCNC: 118 MG/DL
PLATELET # BLD AUTO: 191 K/UL
POTASSIUM SERPL-SCNC: 4.6 MMOL/L
PROT SERPL-MCNC: 7.7 G/DL
RBC # BLD: 4.85 M/UL
RBC # FLD: 13.6 %
SODIUM SERPL-SCNC: 142 MMOL/L
TRIGL SERPL-MCNC: 172 MG/DL
WBC # FLD AUTO: 10.09 K/UL

## 2024-07-23 PROCEDURE — 36415 COLL VENOUS BLD VENIPUNCTURE: CPT

## 2024-07-30 ENCOUNTER — APPOINTMENT (OUTPATIENT)
Dept: INTERNAL MEDICINE | Facility: CLINIC | Age: 79
End: 2024-07-30
Payer: MEDICARE

## 2024-07-30 VITALS
OXYGEN SATURATION: 98 % | BODY MASS INDEX: 25.1 KG/M2 | WEIGHT: 147 LBS | HEIGHT: 64 IN | SYSTOLIC BLOOD PRESSURE: 147 MMHG | DIASTOLIC BLOOD PRESSURE: 75 MMHG | HEART RATE: 83 BPM

## 2024-07-30 PROCEDURE — 99214 OFFICE O/P EST MOD 30 MIN: CPT

## 2024-07-30 PROCEDURE — G2211 COMPLEX E/M VISIT ADD ON: CPT

## 2024-07-30 NOTE — ASSESSMENT
[Patient Optimized for Surgery] : Patient optimized for surgery [No Further Testing Recommended] : no further testing recommended [As per surgery] : as per surgery [FreeTextEntry4] : I had the pleasure of seeing Ms. Magy Hernández today for a preop medical clearance evaluation.  Patient is medically stable and optimized for the proposed surgery. Please see my note below for details. If you should have any questions, please do not hesitate to contact me.    Sincerely,   Angelita Guzman M.D.

## 2024-07-30 NOTE — HISTORY OF PRESENT ILLNESS
[No Pertinent Cardiac History] : no history of aortic stenosis, atrial fibrillation, coronary artery disease, recent myocardial infarction, or implantable device/pacemaker [No Pertinent Pulmonary History] : no history of asthma, COPD, sleep apnea, or smoking [No Adverse Anesthesia Reaction] : no adverse anesthesia reaction in self or family member [(Patient denies any chest pain, claudication, dyspnea on exertion, orthopnea, palpitations or syncope)] : Patient denies any chest pain, claudication, dyspnea on exertion, orthopnea, palpitations or syncope [Moderate (4-6 METs)] : Moderate (4-6 METs) [Chronic Anticoagulation] : no chronic anticoagulation [Chronic Kidney Disease] : no chronic kidney disease [Diabetes] : no diabetes [FreeTextEntry1] : Cataract surgery [FreeTextEntry2] : 8/14/2024 [FreeTextEntry3] : Dr. José Luis Arguelles  [FreeTextEntry4] : Patient presents for preop evaluation for above procedure.  Feels overall well with no acute complaints.

## 2024-10-15 ENCOUNTER — APPOINTMENT (OUTPATIENT)
Dept: GYNECOLOGIC ONCOLOGY | Facility: CLINIC | Age: 79
End: 2024-10-15
Payer: MEDICARE

## 2024-10-15 VITALS
OXYGEN SATURATION: 98 % | WEIGHT: 148 LBS | SYSTOLIC BLOOD PRESSURE: 147 MMHG | HEART RATE: 67 BPM | TEMPERATURE: 98.1 F | DIASTOLIC BLOOD PRESSURE: 79 MMHG | HEIGHT: 64 IN | BODY MASS INDEX: 25.27 KG/M2

## 2024-10-15 PROCEDURE — 99459 PELVIC EXAMINATION: CPT

## 2024-10-15 PROCEDURE — G2211 COMPLEX E/M VISIT ADD ON: CPT

## 2024-10-15 PROCEDURE — 99214 OFFICE O/P EST MOD 30 MIN: CPT

## 2024-10-24 ENCOUNTER — APPOINTMENT (OUTPATIENT)
Dept: INTERNAL MEDICINE | Facility: CLINIC | Age: 79
End: 2024-10-24
Payer: MEDICARE

## 2024-10-24 ENCOUNTER — NON-APPOINTMENT (OUTPATIENT)
Age: 79
End: 2024-10-24

## 2024-10-24 VITALS
SYSTOLIC BLOOD PRESSURE: 124 MMHG | HEART RATE: 56 BPM | HEIGHT: 64 IN | BODY MASS INDEX: 25.1 KG/M2 | OXYGEN SATURATION: 98 % | WEIGHT: 147 LBS | DIASTOLIC BLOOD PRESSURE: 56 MMHG

## 2024-10-24 DIAGNOSIS — C54.1 MALIGNANT NEOPLASM OF ENDOMETRIUM: ICD-10-CM

## 2024-10-24 PROCEDURE — G2211 COMPLEX E/M VISIT ADD ON: CPT

## 2024-10-24 PROCEDURE — 36415 COLL VENOUS BLD VENIPUNCTURE: CPT

## 2024-10-24 PROCEDURE — 99214 OFFICE O/P EST MOD 30 MIN: CPT

## 2024-10-24 PROCEDURE — 93000 ELECTROCARDIOGRAM COMPLETE: CPT

## 2024-10-26 DIAGNOSIS — M85.80 OTHER SPECIFIED DISORDERS OF BONE DENSITY AND STRUCTURE, UNSPECIFIED SITE: ICD-10-CM

## 2024-10-29 LAB
24R-OH-CALCIDIOL SERPL-MCNC: 54.6 PG/ML
25(OH)D3 SERPL-MCNC: 32.8 NG/ML
25(OH)D3 SERPL-MCNC: 38.6 NG/ML
ALBUMIN SERPL ELPH-MCNC: 4.6 G/DL
ALP BLD-CCNC: 86 U/L
ALT SERPL-CCNC: 16 U/L
ANION GAP SERPL CALC-SCNC: 15 MMOL/L
AST SERPL-CCNC: 23 U/L
BASOPHILS # BLD AUTO: 0.03 K/UL
BASOPHILS NFR BLD AUTO: 0.2 %
BILIRUB SERPL-MCNC: 0.4 MG/DL
BUN SERPL-MCNC: 23 MG/DL
CALCIUM SERPL-MCNC: 10.7 MG/DL
CALCIUM SERPL-MCNC: 10.7 MG/DL
CHLORIDE SERPL-SCNC: 104 MMOL/L
CHOLEST SERPL-MCNC: 186 MG/DL
CO2 SERPL-SCNC: 24 MMOL/L
CREAT SERPL-MCNC: 1.24 MG/DL
EGFR: 44 ML/MIN/1.73M2
EOSINOPHIL # BLD AUTO: 0.07 K/UL
EOSINOPHIL NFR BLD AUTO: 0.5 %
ESTIMATED AVERAGE GLUCOSE: 111 MG/DL
FERRITIN SERPL-MCNC: 169 NG/ML
FOLATE SERPL-MCNC: 13.1 NG/ML
GLUCOSE SERPL-MCNC: 92 MG/DL
HBA1C MFR BLD HPLC: 5.5 %
HCT VFR BLD CALC: 43.1 %
HDLC SERPL-MCNC: 76 MG/DL
HGB BLD-MCNC: 13.4 G/DL
IMM GRANULOCYTES NFR BLD AUTO: 0.3 %
IRON SATN MFR SERPL: 23 %
IRON SERPL-MCNC: 77 UG/DL
LDLC SERPL CALC-MCNC: 81 MG/DL
LYMPHOCYTES # BLD AUTO: 4.9 K/UL
LYMPHOCYTES NFR BLD AUTO: 36.6 %
MAGNESIUM SERPL-MCNC: 2.1 MG/DL
MAN DIFF?: NORMAL
MCHC RBC-ENTMCNC: 27.3 PG
MCHC RBC-ENTMCNC: 31.1 GM/DL
MCV RBC AUTO: 88 FL
MONOCYTES # BLD AUTO: 0.85 K/UL
MONOCYTES NFR BLD AUTO: 6.3 %
NEUTROPHILS # BLD AUTO: 7.51 K/UL
NEUTROPHILS NFR BLD AUTO: 56.1 %
NONHDLC SERPL-MCNC: 110 MG/DL
PARATHYROID HORMONE INTACT: 122 PG/ML
PHOSPHATE SERPL-MCNC: 3.2 MG/DL
PLATELET # BLD AUTO: 232 K/UL
POTASSIUM SERPL-SCNC: 4.8 MMOL/L
PROT SERPL-MCNC: 7.6 G/DL
RBC # BLD: 4.9 M/UL
RBC # FLD: 13.2 %
SODIUM SERPL-SCNC: 143 MMOL/L
TIBC SERPL-MCNC: 336 UG/DL
TRIGL SERPL-MCNC: 172 MG/DL
TSH SERPL-ACNC: 4.16 UIU/ML
UIBC SERPL-MCNC: 260 UG/DL
VIT B12 SERPL-MCNC: 477 PG/ML
WBC # FLD AUTO: 13.4 K/UL

## 2024-10-30 LAB — CA-I SERPL-SCNC: 5.7 MG/DL

## 2024-11-03 LAB — PTH RELATED PROT SERPL-MCNC: <2 PMOL/L

## 2025-01-09 ENCOUNTER — APPOINTMENT (OUTPATIENT)
Dept: GASTROENTEROLOGY | Facility: CLINIC | Age: 80
End: 2025-01-09

## 2025-01-09 VITALS
OXYGEN SATURATION: 100 % | HEART RATE: 83 BPM | SYSTOLIC BLOOD PRESSURE: 166 MMHG | TEMPERATURE: 97.7 F | HEIGHT: 64 IN | WEIGHT: 150 LBS | DIASTOLIC BLOOD PRESSURE: 81 MMHG | BODY MASS INDEX: 25.61 KG/M2

## 2025-01-09 DIAGNOSIS — K59.00 CONSTIPATION, UNSPECIFIED: ICD-10-CM

## 2025-01-09 PROCEDURE — 99214 OFFICE O/P EST MOD 30 MIN: CPT

## 2025-01-09 PROCEDURE — G2211 COMPLEX E/M VISIT ADD ON: CPT

## 2025-01-09 RX ORDER — GABAPENTIN 100 MG
100 TABLET ORAL
Refills: 0 | Status: ACTIVE | COMMUNITY

## 2025-01-14 ENCOUNTER — APPOINTMENT (OUTPATIENT)
Dept: GYNECOLOGIC ONCOLOGY | Facility: CLINIC | Age: 80
End: 2025-01-14
Payer: MEDICARE

## 2025-01-14 VITALS
TEMPERATURE: 97.6 F | OXYGEN SATURATION: 99 % | SYSTOLIC BLOOD PRESSURE: 129 MMHG | HEIGHT: 55 IN | DIASTOLIC BLOOD PRESSURE: 75 MMHG | RESPIRATION RATE: 16 BRPM | HEART RATE: 68 BPM | WEIGHT: 155 LBS | BODY MASS INDEX: 35.87 KG/M2

## 2025-01-14 VITALS
TEMPERATURE: 97.6 F | SYSTOLIC BLOOD PRESSURE: 118 MMHG | OXYGEN SATURATION: 99 % | DIASTOLIC BLOOD PRESSURE: 52 MMHG | RESPIRATION RATE: 16 BRPM | WEIGHT: 148 LBS | HEART RATE: 67 BPM | BODY MASS INDEX: 34.25 KG/M2 | HEIGHT: 55 IN

## 2025-01-14 PROCEDURE — 99459 PELVIC EXAMINATION: CPT

## 2025-01-14 PROCEDURE — 99214 OFFICE O/P EST MOD 30 MIN: CPT

## 2025-01-14 PROCEDURE — G2211 COMPLEX E/M VISIT ADD ON: CPT

## 2025-02-12 NOTE — PROGRESS NOTE ADULT - ASSESSMENT
Infusion Nursing Note:  Gely Keene presents today for IVF.    Patient seen by provider today: No   present during visit today: Not Applicable.    Note: Gely reports she feels the IVF help her in terms of energy as well as her cognition.  She does state that she still is noticing intermittent chest pressure, as mentioned at her appt here last week. She has appt with her primary MD later today and plans to discuss with her at that time      Intravenous Access:  Implanted Port.    Treatment Conditions:  Not Applicable.      Post Infusion Assessment:  Patient tolerated infusion without incident.  Blood return noted pre and post infusion.  Site patent and intact, free from redness, edema or discomfort.  No evidence of extravasations.  Access discontinued per protocol.       Discharge Plan:   AVS to patient via MYCHART.  Patient will return 2/19/25 for IVF   Patient discharged in stable condition accompanied by: daughter.  Departure Mode: Ambulatory.      Gris Fisher RN    77 yo F, h/o HLD, presenting from urgent care after her bilateral cat bite 4 days ago, admitted for purulent cellulitis, unasyn and vancomycin. MRI findings c/w cellulitis and early osteomyelitis. 75 yo F, h/o HLD, presenting from urgent care after her bilateral cat bite. MRI findings c/w cellulitis, osteo, and septic arthritis.

## 2025-04-28 ENCOUNTER — APPOINTMENT (OUTPATIENT)
Dept: MAMMOGRAPHY | Facility: CLINIC | Age: 80
End: 2025-04-28

## 2025-05-08 ENCOUNTER — APPOINTMENT (OUTPATIENT)
Dept: INTERNAL MEDICINE | Facility: CLINIC | Age: 80
End: 2025-05-08
Payer: MEDICARE

## 2025-05-08 PROCEDURE — 36415 COLL VENOUS BLD VENIPUNCTURE: CPT

## 2025-05-09 LAB
25(OH)D3 SERPL-MCNC: 29.5 NG/ML
ALBUMIN SERPL ELPH-MCNC: 4.4 G/DL
ALP BLD-CCNC: 94 U/L
ALT SERPL-CCNC: 14 U/L
ANION GAP SERPL CALC-SCNC: 13 MMOL/L
APPEARANCE: CLEAR
AST SERPL-CCNC: 20 U/L
BASOPHILS # BLD AUTO: 0.03 K/UL
BASOPHILS NFR BLD AUTO: 0.3 %
BILIRUB SERPL-MCNC: 0.7 MG/DL
BILIRUBIN URINE: NEGATIVE
BLOOD URINE: NEGATIVE
BUN SERPL-MCNC: 19 MG/DL
CALCIUM SERPL-MCNC: 10.3 MG/DL
CHLORIDE SERPL-SCNC: 107 MMOL/L
CHOLEST SERPL-MCNC: 188 MG/DL
CO2 SERPL-SCNC: 22 MMOL/L
COLOR: YELLOW
CREAT SERPL-MCNC: 1.07 MG/DL
EGFRCR SERPLBLD CKD-EPI 2021: 52 ML/MIN/1.73M2
EOSINOPHIL # BLD AUTO: 0.14 K/UL
EOSINOPHIL NFR BLD AUTO: 1.2 %
ESTIMATED AVERAGE GLUCOSE: 103 MG/DL
GLUCOSE QUALITATIVE U: NEGATIVE MG/DL
GLUCOSE SERPL-MCNC: 110 MG/DL
HBA1C MFR BLD HPLC: 5.2 %
HCT VFR BLD CALC: 42.2 %
HDLC SERPL-MCNC: 67 MG/DL
HGB BLD-MCNC: 13.5 G/DL
IMM GRANULOCYTES NFR BLD AUTO: 0.4 %
KETONES URINE: NEGATIVE MG/DL
LDLC SERPL-MCNC: 99 MG/DL
LEUKOCYTE ESTERASE URINE: NEGATIVE
LYMPHOCYTES # BLD AUTO: 2.99 K/UL
LYMPHOCYTES NFR BLD AUTO: 25.3 %
MAN DIFF?: NORMAL
MCHC RBC-ENTMCNC: 26.6 PG
MCHC RBC-ENTMCNC: 32 G/DL
MCV RBC AUTO: 83.2 FL
MONOCYTES # BLD AUTO: 0.64 K/UL
MONOCYTES NFR BLD AUTO: 5.4 %
NEUTROPHILS # BLD AUTO: 7.96 K/UL
NEUTROPHILS NFR BLD AUTO: 67.4 %
NITRITE URINE: NEGATIVE
NONHDLC SERPL-MCNC: 120 MG/DL
PH URINE: 5.5
PLATELET # BLD AUTO: 202 K/UL
POTASSIUM SERPL-SCNC: 4.4 MMOL/L
PROT SERPL-MCNC: 7.4 G/DL
PROTEIN URINE: NORMAL MG/DL
RBC # BLD: 5.07 M/UL
RBC # FLD: 13 %
SODIUM SERPL-SCNC: 142 MMOL/L
SPECIFIC GRAVITY URINE: 1.02
TRIGL SERPL-MCNC: 123 MG/DL
TSH SERPL-ACNC: 1.92 UIU/ML
UROBILINOGEN URINE: 0.2 MG/DL
WBC # FLD AUTO: 11.81 K/UL

## 2025-05-22 ENCOUNTER — RX RENEWAL (OUTPATIENT)
Age: 80
End: 2025-05-22

## 2025-05-27 ENCOUNTER — NON-APPOINTMENT (OUTPATIENT)
Age: 80
End: 2025-05-27

## 2025-05-27 ENCOUNTER — APPOINTMENT (OUTPATIENT)
Dept: INTERNAL MEDICINE | Facility: CLINIC | Age: 80
End: 2025-05-27

## 2025-05-27 VITALS
SYSTOLIC BLOOD PRESSURE: 149 MMHG | WEIGHT: 147 LBS | BODY MASS INDEX: 34.02 KG/M2 | HEART RATE: 57 BPM | OXYGEN SATURATION: 99 % | HEIGHT: 55 IN | DIASTOLIC BLOOD PRESSURE: 75 MMHG

## 2025-05-27 DIAGNOSIS — E78.00 PURE HYPERCHOLESTEROLEMIA, UNSPECIFIED: ICD-10-CM

## 2025-05-27 DIAGNOSIS — R53.83 OTHER FATIGUE: ICD-10-CM

## 2025-05-27 DIAGNOSIS — Z87.448 PERSONAL HISTORY OF OTHER DISEASES OF URINARY SYSTEM: ICD-10-CM

## 2025-05-27 DIAGNOSIS — C54.1 MALIGNANT NEOPLASM OF ENDOMETRIUM: ICD-10-CM

## 2025-05-27 DIAGNOSIS — N95.0 POSTMENOPAUSAL BLEEDING: ICD-10-CM

## 2025-05-27 DIAGNOSIS — F51.09 OTHER INSOMNIA NOT DUE TO A SUBSTANCE OR KNOWN PHYSIOLOGICAL CONDITION: ICD-10-CM

## 2025-05-27 DIAGNOSIS — M86.242: ICD-10-CM

## 2025-05-27 PROCEDURE — G0439: CPT

## 2025-05-27 PROCEDURE — G0444 DEPRESSION SCREEN ANNUAL: CPT | Mod: 59

## 2025-05-27 PROCEDURE — 93000 ELECTROCARDIOGRAM COMPLETE: CPT | Mod: 59

## 2025-05-27 PROCEDURE — G2211 COMPLEX E/M VISIT ADD ON: CPT

## 2025-05-27 PROCEDURE — 99215 OFFICE O/P EST HI 40 MIN: CPT | Mod: 25

## 2025-06-16 ENCOUNTER — RESULT REVIEW (OUTPATIENT)
Age: 80
End: 2025-06-16

## 2025-06-16 ENCOUNTER — APPOINTMENT (OUTPATIENT)
Dept: MAMMOGRAPHY | Facility: CLINIC | Age: 80
End: 2025-06-16
Payer: MEDICARE

## 2025-06-16 PROCEDURE — 77063 BREAST TOMOSYNTHESIS BI: CPT

## 2025-06-16 PROCEDURE — 77067 SCR MAMMO BI INCL CAD: CPT

## 2025-09-02 ENCOUNTER — APPOINTMENT (OUTPATIENT)
Dept: GYNECOLOGIC ONCOLOGY | Facility: CLINIC | Age: 80
End: 2025-09-02
Payer: MEDICARE

## 2025-09-02 VITALS
RESPIRATION RATE: 16 BRPM | BODY MASS INDEX: 33.56 KG/M2 | HEART RATE: 60 BPM | WEIGHT: 145 LBS | TEMPERATURE: 98 F | SYSTOLIC BLOOD PRESSURE: 145 MMHG | OXYGEN SATURATION: 99 % | DIASTOLIC BLOOD PRESSURE: 80 MMHG | HEIGHT: 55 IN

## 2025-09-02 PROCEDURE — 99459 PELVIC EXAMINATION: CPT

## 2025-09-02 PROCEDURE — G2211 COMPLEX E/M VISIT ADD ON: CPT

## 2025-09-02 PROCEDURE — 99214 OFFICE O/P EST MOD 30 MIN: CPT

## (undated) DEVICE — SUT VICRYL 0 27" CT-2 UNDYED

## (undated) DEVICE — SPECULUM VAGINAL MED DISP

## (undated) DEVICE — FOLEY TRAY 16FR 5CC LF UMETER CLOSED

## (undated) DEVICE — POSITIONER FOAM HEAD CRADLE (PINK)

## (undated) DEVICE — XI ARM FORCEP PROGRASP 8MM

## (undated) DEVICE — XI ENDOWRIST SUCTION IRRIGATOR 8MM

## (undated) DEVICE — SYR LUER LOK 10CC

## (undated) DEVICE — XI TIP COVER

## (undated) DEVICE — PROTECTOR HEEL / ELBOW

## (undated) DEVICE — POSITIONER PURPLE ARM ONE STEP (LARGE)

## (undated) DEVICE — GLV 6.5 PROTEXIS (WHITE)

## (undated) DEVICE — TROCAR SURGIQUEST AIRSEAL 8MMX100MM

## (undated) DEVICE — VENODYNE/SCD SLEEVE CALF MEDIUM

## (undated) DEVICE — ENDOCATCH 10MM SPECIMEN POUCH

## (undated) DEVICE — ELCTR GROUNDING PAD ADULT COVIDIEN

## (undated) DEVICE — SUT VLOC 180 2-0 6" GS-22 GREEN

## (undated) DEVICE — D HELP - CLEARVIEW CLEARIFY SYSTEM

## (undated) DEVICE — UTERINE MANIPULATOR CONMED VCARE SM 32MM

## (undated) DEVICE — POSITIONER STRAP ARMBOARD VELCRO TS-30

## (undated) DEVICE — XI ARM NEEDLE DRIVER LARGE

## (undated) DEVICE — XI DRAPE COLUMN

## (undated) DEVICE — SUT MONOCRYL 4-0 27" PS-2 UNDYED

## (undated) DEVICE — TROCAR SURGIQUEST AIRSEAL 5MM X 100MM

## (undated) DEVICE — SUT POLYSORB 0 60" TIES UNDYED

## (undated) DEVICE — TUBING STRYKEFLOW II SUCTION / IRRIGATOR

## (undated) DEVICE — UTERINE MANIPULATOR MPM MEDICAL ZUMI 4.5MM

## (undated) DEVICE — XI VESSEL SEALER

## (undated) DEVICE — APPLICATOR FOR ARISTA XL 38CM

## (undated) DEVICE — XI ARM SCISSOR MONO CURVED

## (undated) DEVICE — XI SEAL UNIVERSIAL 5-12MM

## (undated) DEVICE — DRSG GAUZE PACKTNER ROLL

## (undated) DEVICE — LUBRICANT INST ELECTROLUBE Z SOLUTION

## (undated) DEVICE — GOWN XL

## (undated) DEVICE — SUT VLOC 180 0 9" GS-21 GREEN

## (undated) DEVICE — GLV 6 PROTEXIS (WHITE)

## (undated) DEVICE — TUBING AIRSEAL TRI-LUMEN FILTERED

## (undated) DEVICE — LUBRICATING JELLY ONESHOT 1.25OZ

## (undated) DEVICE — POSITIONER PINK PAD PIGAZZI SYSTEM

## (undated) DEVICE — SUT VLOC 180 3-0 9" V-20 GREEN

## (undated) DEVICE — PACK PERI GYN

## (undated) DEVICE — XI ARM NEEDLE DRIVER MEGA

## (undated) DEVICE — XI ARM FORCEP FENESTRATED BIPOLAR 8MM

## (undated) DEVICE — UTERINE MANIPULATOR CONMED VCARE MED 34MM

## (undated) DEVICE — PACK D&C

## (undated) DEVICE — XI DRAPE ARM

## (undated) DEVICE — APPLICATOR VISTASEAL LAP DUAL 35CM RIGID

## (undated) DEVICE — UTERINE MANIPULATOR CONMED VCARE LG 37MM

## (undated) DEVICE — PACK ROBOTIC LIJ

## (undated) DEVICE — ELCTR BOVIE PENCIL SMOKE EVACUATION

## (undated) DEVICE — DRSG DERMABOND 0.7ML

## (undated) DEVICE — XI SYNCHROSEAL VESSEL SEALER 8MM